# Patient Record
Sex: MALE | Race: WHITE | Employment: OTHER | URBAN - METROPOLITAN AREA
[De-identification: names, ages, dates, MRNs, and addresses within clinical notes are randomized per-mention and may not be internally consistent; named-entity substitution may affect disease eponyms.]

---

## 2017-01-06 RX ORDER — FLUOXETINE 10 MG/1
CAPSULE ORAL
Qty: 90 CAPSULE | Refills: 0 | Status: SHIPPED | OUTPATIENT
Start: 2017-01-06 | End: 2017-04-01 | Stop reason: SDUPTHER

## 2017-01-30 PROBLEM — Z95.810 S/P IMPLANTATION OF AUTOMATIC CARDIOVERTER/DEFIBRILLATOR (AICD): Status: ACTIVE | Noted: 2017-01-30

## 2017-03-06 ENCOUNTER — HOSPITAL ENCOUNTER (OUTPATIENT)
Dept: OTHER | Age: 82
Discharge: HOME | End: 2017-03-06
Payer: MEDICARE

## 2017-03-06 VITALS
SYSTOLIC BLOOD PRESSURE: 140 MMHG | HEART RATE: 65 BPM | RESPIRATION RATE: 20 BRPM | OXYGEN SATURATION: 99 % | DIASTOLIC BLOOD PRESSURE: 72 MMHG | WEIGHT: 184.6 LBS | BODY MASS INDEX: 25.04 KG/M2

## 2017-03-06 PROCEDURE — 99212 OFFICE O/P EST SF 10 MIN: CPT

## 2017-03-10 ENCOUNTER — HOSPITAL ENCOUNTER (OUTPATIENT)
Dept: CARDIAC REHAB | Age: 82
Setting detail: THERAPIES SERIES
Discharge: HOME | End: 2017-03-10
Payer: MEDICARE

## 2017-03-10 VITALS — HEIGHT: 72 IN | BODY MASS INDEX: 25 KG/M2 | WEIGHT: 184.6 LBS | HEART RATE: 63 BPM

## 2017-03-10 PROCEDURE — 93798 PHYS/QHP OP CAR RHAB W/ECG: CPT

## 2017-03-13 ENCOUNTER — HOSPITAL ENCOUNTER (OUTPATIENT)
Dept: CARDIAC REHAB | Age: 82
Setting detail: THERAPIES SERIES
Discharge: HOME | End: 2017-03-13
Payer: MEDICARE

## 2017-03-13 VITALS — BODY MASS INDEX: 24.87 KG/M2 | WEIGHT: 183.4 LBS

## 2017-03-13 PROCEDURE — 93798 PHYS/QHP OP CAR RHAB W/ECG: CPT

## 2017-03-13 RX ORDER — FLUDROCORTISONE ACETATE 0.1 MG/1
TABLET ORAL
Qty: 90 TABLET | Refills: 1 | Status: ON HOLD | OUTPATIENT
Start: 2017-03-13 | End: 2020-01-01

## 2017-03-17 ENCOUNTER — HOSPITAL ENCOUNTER (OUTPATIENT)
Dept: CARDIAC REHAB | Age: 82
Setting detail: THERAPIES SERIES
Discharge: HOME | End: 2017-03-17
Payer: MEDICARE

## 2017-03-17 VITALS — BODY MASS INDEX: 24.97 KG/M2 | WEIGHT: 184.1 LBS

## 2017-03-17 PROCEDURE — 93798 PHYS/QHP OP CAR RHAB W/ECG: CPT

## 2017-03-20 ENCOUNTER — HOSPITAL ENCOUNTER (OUTPATIENT)
Dept: CARDIAC REHAB | Age: 82
Setting detail: THERAPIES SERIES
Discharge: HOME | End: 2017-03-20
Payer: MEDICARE

## 2017-03-20 VITALS — WEIGHT: 185.1 LBS | BODY MASS INDEX: 25.1 KG/M2

## 2017-03-20 PROCEDURE — 93798 PHYS/QHP OP CAR RHAB W/ECG: CPT

## 2017-03-24 ENCOUNTER — HOSPITAL ENCOUNTER (OUTPATIENT)
Dept: CARDIAC REHAB | Age: 82
Setting detail: THERAPIES SERIES
Discharge: HOME | End: 2017-03-24
Payer: MEDICARE

## 2017-03-24 VITALS — BODY MASS INDEX: 25.28 KG/M2 | WEIGHT: 186.4 LBS

## 2017-03-24 PROCEDURE — 93798 PHYS/QHP OP CAR RHAB W/ECG: CPT

## 2017-03-27 ENCOUNTER — HOSPITAL ENCOUNTER (OUTPATIENT)
Dept: CARDIAC REHAB | Age: 82
Setting detail: THERAPIES SERIES
Discharge: HOME | End: 2017-03-27
Payer: MEDICARE

## 2017-03-27 VITALS — BODY MASS INDEX: 25.12 KG/M2 | WEIGHT: 185.2 LBS

## 2017-03-27 PROCEDURE — 93798 PHYS/QHP OP CAR RHAB W/ECG: CPT

## 2017-03-31 ENCOUNTER — HOSPITAL ENCOUNTER (OUTPATIENT)
Dept: CARDIAC REHAB | Age: 82
Setting detail: THERAPIES SERIES
Discharge: HOME | End: 2017-03-31
Payer: MEDICARE

## 2017-03-31 VITALS — WEIGHT: 185.4 LBS | BODY MASS INDEX: 25.14 KG/M2

## 2017-03-31 PROCEDURE — 93798 PHYS/QHP OP CAR RHAB W/ECG: CPT

## 2017-04-03 ENCOUNTER — HOSPITAL ENCOUNTER (OUTPATIENT)
Dept: CARDIAC REHAB | Age: 82
Setting detail: THERAPIES SERIES
Discharge: HOME | End: 2017-04-03
Payer: MEDICARE

## 2017-04-03 VITALS — WEIGHT: 184.6 LBS | BODY MASS INDEX: 25.04 KG/M2

## 2017-04-03 PROCEDURE — 93798 PHYS/QHP OP CAR RHAB W/ECG: CPT

## 2017-04-03 RX ORDER — FLUOXETINE 10 MG/1
CAPSULE ORAL
Qty: 90 CAPSULE | Refills: 0 | Status: SHIPPED | OUTPATIENT
Start: 2017-04-03 | End: 2017-06-20 | Stop reason: SDUPTHER

## 2017-04-07 ENCOUNTER — HOSPITAL ENCOUNTER (OUTPATIENT)
Dept: CARDIAC REHAB | Age: 82
Setting detail: THERAPIES SERIES
Discharge: HOME | End: 2017-04-07
Payer: MEDICARE

## 2017-04-07 VITALS — WEIGHT: 187.3 LBS | BODY MASS INDEX: 25.4 KG/M2

## 2017-04-07 PROCEDURE — 93798 PHYS/QHP OP CAR RHAB W/ECG: CPT

## 2017-04-10 ENCOUNTER — HOSPITAL ENCOUNTER (OUTPATIENT)
Dept: CARDIAC REHAB | Age: 82
Setting detail: THERAPIES SERIES
Discharge: HOME | End: 2017-04-10
Payer: MEDICARE

## 2017-04-10 VITALS — WEIGHT: 184.4 LBS | BODY MASS INDEX: 25.01 KG/M2

## 2017-04-10 PROCEDURE — 93798 PHYS/QHP OP CAR RHAB W/ECG: CPT

## 2017-04-17 ENCOUNTER — HOSPITAL ENCOUNTER (OUTPATIENT)
Dept: CARDIAC REHAB | Age: 82
Setting detail: THERAPIES SERIES
Discharge: HOME | End: 2017-04-17
Payer: MEDICARE

## 2017-04-17 VITALS — BODY MASS INDEX: 25.29 KG/M2 | WEIGHT: 186.5 LBS

## 2017-04-17 PROCEDURE — 93798 PHYS/QHP OP CAR RHAB W/ECG: CPT

## 2017-04-21 ENCOUNTER — HOSPITAL ENCOUNTER (OUTPATIENT)
Dept: CARDIAC REHAB | Age: 82
Setting detail: THERAPIES SERIES
Discharge: HOME | End: 2017-04-21
Payer: MEDICARE

## 2017-04-21 VITALS — WEIGHT: 185 LBS | BODY MASS INDEX: 25.09 KG/M2

## 2017-04-21 PROCEDURE — 93798 PHYS/QHP OP CAR RHAB W/ECG: CPT

## 2017-04-24 ENCOUNTER — HOSPITAL ENCOUNTER (OUTPATIENT)
Dept: CARDIAC REHAB | Age: 82
Setting detail: THERAPIES SERIES
Discharge: HOME | End: 2017-04-24
Payer: MEDICARE

## 2017-04-24 VITALS — BODY MASS INDEX: 24.63 KG/M2 | WEIGHT: 181.6 LBS

## 2017-04-24 PROCEDURE — 93798 PHYS/QHP OP CAR RHAB W/ECG: CPT

## 2017-04-26 ENCOUNTER — HOSPITAL ENCOUNTER (OUTPATIENT)
Dept: CARDIAC REHAB | Age: 82
Setting detail: THERAPIES SERIES
Discharge: HOME | End: 2017-04-26
Payer: MEDICARE

## 2017-04-26 VITALS — WEIGHT: 185.1 LBS | BODY MASS INDEX: 25.1 KG/M2

## 2017-04-26 PROCEDURE — 93798 PHYS/QHP OP CAR RHAB W/ECG: CPT

## 2017-04-28 ENCOUNTER — HOSPITAL ENCOUNTER (OUTPATIENT)
Dept: CARDIAC REHAB | Age: 82
Setting detail: THERAPIES SERIES
Discharge: HOME | End: 2017-04-28
Payer: MEDICARE

## 2017-04-28 VITALS — WEIGHT: 184.6 LBS | BODY MASS INDEX: 25.04 KG/M2

## 2017-04-28 PROCEDURE — 93798 PHYS/QHP OP CAR RHAB W/ECG: CPT

## 2017-05-01 ENCOUNTER — HOSPITAL ENCOUNTER (OUTPATIENT)
Dept: CARDIAC REHAB | Age: 82
Setting detail: THERAPIES SERIES
Discharge: HOME | End: 2017-05-01
Payer: MEDICARE

## 2017-05-01 ENCOUNTER — HOSPITAL ENCOUNTER (OUTPATIENT)
Age: 82
Discharge: HOME | End: 2017-05-01
Payer: MEDICARE

## 2017-05-01 VITALS — BODY MASS INDEX: 24.45 KG/M2 | WEIGHT: 180.3 LBS

## 2017-05-01 LAB
-: ABNORMAL
AMORPHOUS: ABNORMAL
BACTERIA: ABNORMAL
BILIRUBIN URINE: NEGATIVE
CASTS UA: ABNORMAL /LPF
COLOR: YELLOW
COMMENT UA: ABNORMAL
CRYSTALS, UA: ABNORMAL /HPF
EPITHELIAL CELLS UA: ABNORMAL /HPF
GLUCOSE URINE: NEGATIVE
KETONES, URINE: NEGATIVE
LEUKOCYTE ESTERASE, URINE: ABNORMAL
MUCUS: ABNORMAL
NITRITE, URINE: NEGATIVE
OTHER OBSERVATIONS UA: ABNORMAL
PH UA: 6 (ref 5–8)
PROTEIN UA: NEGATIVE
RBC UA: ABNORMAL /HPF
RENAL EPITHELIAL, UA: ABNORMAL /HPF
SPECIFIC GRAVITY UA: 1.02 (ref 1–1.03)
TRICHOMONAS: ABNORMAL
TURBIDITY: CLEAR
URINE HGB: NEGATIVE
UROBILINOGEN, URINE: NORMAL
WBC UA: ABNORMAL /HPF
YEAST: ABNORMAL

## 2017-05-01 PROCEDURE — 93798 PHYS/QHP OP CAR RHAB W/ECG: CPT

## 2017-05-01 PROCEDURE — 81001 URINALYSIS AUTO W/SCOPE: CPT

## 2017-05-01 PROCEDURE — 87086 URINE CULTURE/COLONY COUNT: CPT

## 2017-05-02 LAB
CULTURE: NO GROWTH
CULTURE: NORMAL
Lab: NORMAL
SPECIMEN DESCRIPTION: NORMAL
SPECIMEN DESCRIPTION: NORMAL
STATUS: NORMAL

## 2017-05-03 ENCOUNTER — HOSPITAL ENCOUNTER (OUTPATIENT)
Dept: CARDIAC REHAB | Age: 82
Setting detail: THERAPIES SERIES
Discharge: HOME | End: 2017-05-03
Payer: MEDICARE

## 2017-05-03 VITALS — WEIGHT: 182.2 LBS | BODY MASS INDEX: 24.71 KG/M2

## 2017-05-03 PROCEDURE — 93798 PHYS/QHP OP CAR RHAB W/ECG: CPT

## 2017-05-05 ENCOUNTER — HOSPITAL ENCOUNTER (OUTPATIENT)
Dept: CARDIAC REHAB | Age: 82
Setting detail: THERAPIES SERIES
Discharge: HOME | End: 2017-05-05
Payer: MEDICARE

## 2017-05-05 VITALS — WEIGHT: 182.9 LBS | BODY MASS INDEX: 24.81 KG/M2

## 2017-05-05 PROCEDURE — 93798 PHYS/QHP OP CAR RHAB W/ECG: CPT

## 2017-05-08 ENCOUNTER — HOSPITAL ENCOUNTER (OUTPATIENT)
Dept: CARDIAC REHAB | Age: 82
Setting detail: THERAPIES SERIES
Discharge: HOME | End: 2017-05-08
Payer: MEDICARE

## 2017-05-08 VITALS — BODY MASS INDEX: 24.68 KG/M2 | WEIGHT: 182 LBS

## 2017-05-08 PROCEDURE — 93798 PHYS/QHP OP CAR RHAB W/ECG: CPT

## 2017-05-10 ENCOUNTER — HOSPITAL ENCOUNTER (OUTPATIENT)
Dept: CARDIAC REHAB | Age: 82
Setting detail: THERAPIES SERIES
Discharge: HOME | End: 2017-05-10
Payer: MEDICARE

## 2017-05-10 VITALS — BODY MASS INDEX: 24.92 KG/M2 | WEIGHT: 184 LBS | HEIGHT: 72 IN

## 2017-05-10 PROCEDURE — 93798 PHYS/QHP OP CAR RHAB W/ECG: CPT

## 2017-05-12 ENCOUNTER — HOSPITAL ENCOUNTER (OUTPATIENT)
Dept: CARDIAC REHAB | Age: 82
Setting detail: THERAPIES SERIES
Discharge: HOME | End: 2017-05-12
Payer: MEDICARE

## 2017-05-12 VITALS — BODY MASS INDEX: 24.97 KG/M2 | WEIGHT: 184.1 LBS

## 2017-05-12 PROCEDURE — 93798 PHYS/QHP OP CAR RHAB W/ECG: CPT

## 2017-05-15 ENCOUNTER — HOSPITAL ENCOUNTER (OUTPATIENT)
Dept: CARDIAC REHAB | Age: 82
Setting detail: THERAPIES SERIES
Discharge: HOME | End: 2017-05-15
Payer: MEDICARE

## 2017-05-15 VITALS — WEIGHT: 182.3 LBS | BODY MASS INDEX: 24.72 KG/M2

## 2017-05-15 PROCEDURE — 93798 PHYS/QHP OP CAR RHAB W/ECG: CPT

## 2017-05-17 ENCOUNTER — HOSPITAL ENCOUNTER (OUTPATIENT)
Dept: CARDIAC REHAB | Age: 82
Setting detail: THERAPIES SERIES
Discharge: HOME | End: 2017-05-17
Payer: MEDICARE

## 2017-05-17 VITALS — BODY MASS INDEX: 24.81 KG/M2 | WEIGHT: 182.9 LBS

## 2017-05-17 PROCEDURE — 93798 PHYS/QHP OP CAR RHAB W/ECG: CPT

## 2017-05-19 ENCOUNTER — HOSPITAL ENCOUNTER (OUTPATIENT)
Dept: CARDIAC REHAB | Age: 82
Setting detail: THERAPIES SERIES
Discharge: HOME | End: 2017-05-19
Payer: MEDICARE

## 2017-05-19 VITALS — BODY MASS INDEX: 24.98 KG/M2 | WEIGHT: 184.2 LBS

## 2017-05-19 PROCEDURE — 93798 PHYS/QHP OP CAR RHAB W/ECG: CPT

## 2017-05-22 ENCOUNTER — HOSPITAL ENCOUNTER (OUTPATIENT)
Dept: CARDIAC REHAB | Age: 82
Setting detail: THERAPIES SERIES
Discharge: HOME | End: 2017-05-22
Payer: MEDICARE

## 2017-05-22 VITALS — WEIGHT: 182.4 LBS | BODY MASS INDEX: 24.74 KG/M2

## 2017-05-22 PROCEDURE — 93798 PHYS/QHP OP CAR RHAB W/ECG: CPT

## 2017-05-24 ENCOUNTER — HOSPITAL ENCOUNTER (OUTPATIENT)
Dept: CARDIAC REHAB | Age: 82
Setting detail: THERAPIES SERIES
Discharge: HOME | End: 2017-05-24
Payer: MEDICARE

## 2017-05-24 VITALS — WEIGHT: 183.5 LBS | BODY MASS INDEX: 24.89 KG/M2

## 2017-05-24 PROCEDURE — 93798 PHYS/QHP OP CAR RHAB W/ECG: CPT

## 2017-05-26 ENCOUNTER — HOSPITAL ENCOUNTER (OUTPATIENT)
Dept: CARDIAC REHAB | Age: 82
Setting detail: THERAPIES SERIES
Discharge: HOME | End: 2017-05-26
Payer: MEDICARE

## 2017-05-26 VITALS — WEIGHT: 183.6 LBS | BODY MASS INDEX: 24.9 KG/M2

## 2017-05-26 PROCEDURE — 93798 PHYS/QHP OP CAR RHAB W/ECG: CPT

## 2017-05-31 ENCOUNTER — HOSPITAL ENCOUNTER (OUTPATIENT)
Dept: CARDIAC REHAB | Age: 82
Setting detail: THERAPIES SERIES
Discharge: HOME | End: 2017-05-31
Payer: MEDICARE

## 2017-05-31 VITALS — BODY MASS INDEX: 24.98 KG/M2 | WEIGHT: 184.2 LBS

## 2017-05-31 PROCEDURE — 93798 PHYS/QHP OP CAR RHAB W/ECG: CPT

## 2017-06-02 ENCOUNTER — HOSPITAL ENCOUNTER (OUTPATIENT)
Dept: CARDIAC REHAB | Age: 82
Setting detail: THERAPIES SERIES
Discharge: HOME | End: 2017-06-02
Payer: MEDICARE

## 2017-06-02 VITALS — WEIGHT: 185.4 LBS | BODY MASS INDEX: 25.14 KG/M2

## 2017-06-02 PROCEDURE — 93798 PHYS/QHP OP CAR RHAB W/ECG: CPT

## 2017-06-07 ENCOUNTER — HOSPITAL ENCOUNTER (OUTPATIENT)
Dept: CARDIAC REHAB | Age: 82
Setting detail: THERAPIES SERIES
Discharge: HOME | End: 2017-06-07
Payer: MEDICARE

## 2017-06-07 VITALS — BODY MASS INDEX: 25.06 KG/M2 | WEIGHT: 184.8 LBS

## 2017-06-07 PROCEDURE — 93798 PHYS/QHP OP CAR RHAB W/ECG: CPT

## 2017-06-09 ENCOUNTER — HOSPITAL ENCOUNTER (OUTPATIENT)
Dept: CARDIAC REHAB | Age: 82
Setting detail: THERAPIES SERIES
Discharge: HOME | End: 2017-06-09
Payer: MEDICARE

## 2017-06-09 VITALS — WEIGHT: 185.3 LBS | BODY MASS INDEX: 25.13 KG/M2

## 2017-06-09 PROCEDURE — 93798 PHYS/QHP OP CAR RHAB W/ECG: CPT

## 2017-06-12 ENCOUNTER — HOSPITAL ENCOUNTER (OUTPATIENT)
Dept: CARDIAC REHAB | Age: 82
Setting detail: THERAPIES SERIES
Discharge: HOME | End: 2017-06-12
Payer: MEDICARE

## 2017-06-12 VITALS — BODY MASS INDEX: 24.94 KG/M2 | WEIGHT: 183.9 LBS

## 2017-06-12 PROCEDURE — 93798 PHYS/QHP OP CAR RHAB W/ECG: CPT

## 2017-06-14 ENCOUNTER — HOSPITAL ENCOUNTER (OUTPATIENT)
Dept: CARDIAC REHAB | Age: 82
Setting detail: THERAPIES SERIES
Discharge: HOME | End: 2017-06-14
Payer: MEDICARE

## 2017-06-14 VITALS — WEIGHT: 183.9 LBS | BODY MASS INDEX: 24.94 KG/M2

## 2017-06-14 PROCEDURE — 93798 PHYS/QHP OP CAR RHAB W/ECG: CPT

## 2017-06-16 ENCOUNTER — HOSPITAL ENCOUNTER (OUTPATIENT)
Dept: CARDIAC REHAB | Age: 82
Setting detail: THERAPIES SERIES
Discharge: HOME | End: 2017-06-16
Payer: MEDICARE

## 2017-06-16 VITALS — BODY MASS INDEX: 24.85 KG/M2 | WEIGHT: 183.2 LBS

## 2017-06-16 PROCEDURE — 93798 PHYS/QHP OP CAR RHAB W/ECG: CPT

## 2017-06-19 ENCOUNTER — HOSPITAL ENCOUNTER (OUTPATIENT)
Dept: CARDIAC REHAB | Age: 82
Setting detail: THERAPIES SERIES
Discharge: HOME | End: 2017-06-19
Payer: MEDICARE

## 2017-06-19 VITALS — WEIGHT: 184.4 LBS | BODY MASS INDEX: 25.01 KG/M2

## 2017-06-19 PROCEDURE — 93798 PHYS/QHP OP CAR RHAB W/ECG: CPT

## 2017-06-20 ENCOUNTER — HOSPITAL ENCOUNTER (OUTPATIENT)
Age: 82
Setting detail: SPECIMEN
Discharge: HOME | End: 2017-06-20
Payer: MEDICARE

## 2017-06-20 ENCOUNTER — OFFICE VISIT (OUTPATIENT)
Dept: INTERNAL MEDICINE CLINIC | Age: 82
End: 2017-06-20
Payer: MEDICARE

## 2017-06-20 VITALS
RESPIRATION RATE: 20 BRPM | TEMPERATURE: 98 F | OXYGEN SATURATION: 96 % | WEIGHT: 185.2 LBS | HEIGHT: 72 IN | BODY MASS INDEX: 25.09 KG/M2 | HEART RATE: 81 BPM | SYSTOLIC BLOOD PRESSURE: 138 MMHG | DIASTOLIC BLOOD PRESSURE: 80 MMHG

## 2017-06-20 DIAGNOSIS — R53.83 FATIGUE, UNSPECIFIED TYPE: ICD-10-CM

## 2017-06-20 DIAGNOSIS — E78.49 OTHER HYPERLIPIDEMIA: ICD-10-CM

## 2017-06-20 DIAGNOSIS — N28.9 RENAL INSUFFICIENCY: ICD-10-CM

## 2017-06-20 DIAGNOSIS — F41.9 ANXIETY AND DEPRESSION: ICD-10-CM

## 2017-06-20 DIAGNOSIS — R73.01 IFG (IMPAIRED FASTING GLUCOSE): ICD-10-CM

## 2017-06-20 DIAGNOSIS — R53.83 FATIGUE, UNSPECIFIED TYPE: Primary | ICD-10-CM

## 2017-06-20 DIAGNOSIS — M35.3 POLYMYALGIA RHEUMATICA (HCC): ICD-10-CM

## 2017-06-20 DIAGNOSIS — F32.A ANXIETY AND DEPRESSION: ICD-10-CM

## 2017-06-20 DIAGNOSIS — I25.10 CORONARY ARTERY DISEASE INVOLVING NATIVE CORONARY ARTERY OF NATIVE HEART WITHOUT ANGINA PECTORIS: ICD-10-CM

## 2017-06-20 DIAGNOSIS — I10 ESSENTIAL HYPERTENSION: ICD-10-CM

## 2017-06-20 DIAGNOSIS — Z95.1 S/P CABG X 3: ICD-10-CM

## 2017-06-20 DIAGNOSIS — I50.20 CHF (CONGESTIVE HEART FAILURE), NYHA CLASS II, UNSPECIFIED FAILURE CHRONICITY, SYSTOLIC (HCC): ICD-10-CM

## 2017-06-20 DIAGNOSIS — J45.20 MILD INTERMITTENT ASTHMA WITHOUT COMPLICATION: ICD-10-CM

## 2017-06-20 LAB
ALBUMIN SERPL-MCNC: 4.1 G/DL (ref 3.5–5.2)
ALBUMIN/GLOBULIN RATIO: 1.3 (ref 1–2.5)
ALP BLD-CCNC: 74 U/L (ref 40–129)
ALT SERPL-CCNC: 18 U/L (ref 5–41)
ANION GAP SERPL CALCULATED.3IONS-SCNC: 13 MMOL/L (ref 9–17)
AST SERPL-CCNC: 22 U/L
BILIRUB SERPL-MCNC: 0.6 MG/DL (ref 0.3–1.2)
BILIRUBIN URINE: NEGATIVE
BUN BLDV-MCNC: 11 MG/DL (ref 8–23)
BUN/CREAT BLD: ABNORMAL (ref 9–20)
CALCIUM SERPL-MCNC: 9.6 MG/DL (ref 8.6–10.4)
CHLORIDE BLD-SCNC: 101 MMOL/L (ref 98–107)
CO2: 27 MMOL/L (ref 20–31)
COLOR: YELLOW
COMMENT UA: NORMAL
CREAT SERPL-MCNC: 1.23 MG/DL (ref 0.7–1.2)
ESTIMATED AVERAGE GLUCOSE: 103 MG/DL
GFR AFRICAN AMERICAN: >60 ML/MIN
GFR NON-AFRICAN AMERICAN: 57 ML/MIN
GFR SERPL CREATININE-BSD FRML MDRD: ABNORMAL ML/MIN/{1.73_M2}
GFR SERPL CREATININE-BSD FRML MDRD: ABNORMAL ML/MIN/{1.73_M2}
GLUCOSE BLD-MCNC: 108 MG/DL (ref 70–99)
GLUCOSE URINE: NEGATIVE
HBA1C MFR BLD: 5.2 % (ref 4–6)
HCT VFR BLD CALC: 47 % (ref 41–53)
HEMOGLOBIN: 15.7 G/DL (ref 13.5–17.5)
KETONES, URINE: NEGATIVE
LEUKOCYTE ESTERASE, URINE: NEGATIVE
MCH RBC QN AUTO: 30.7 PG (ref 26–34)
MCHC RBC AUTO-ENTMCNC: 33.3 G/DL (ref 31–37)
MCV RBC AUTO: 92.1 FL (ref 80–100)
NITRITE, URINE: NEGATIVE
PDW BLD-RTO: 16.6 % (ref 12.5–15.4)
PH UA: 7 (ref 5–8)
PLATELET # BLD: 166 K/UL (ref 140–450)
PMV BLD AUTO: 9 FL (ref 6–12)
POTASSIUM SERPL-SCNC: 4.9 MMOL/L (ref 3.7–5.3)
PROTEIN UA: NEGATIVE
RBC # BLD: 5.1 M/UL (ref 4.5–5.9)
SODIUM BLD-SCNC: 141 MMOL/L (ref 135–144)
SPECIFIC GRAVITY UA: 1.01 (ref 1–1.03)
TOTAL PROTEIN: 7.3 G/DL (ref 6.4–8.3)
TSH SERPL DL<=0.05 MIU/L-ACNC: 3.25 MIU/L (ref 0.3–5)
TURBIDITY: CLEAR
URINE HGB: NEGATIVE
UROBILINOGEN, URINE: NORMAL
WBC # BLD: 7.6 K/UL (ref 3.5–11)

## 2017-06-20 PROCEDURE — G8598 ASA/ANTIPLAT THER USED: HCPCS | Performed by: FAMILY MEDICINE

## 2017-06-20 PROCEDURE — 1123F ACP DISCUSS/DSCN MKR DOCD: CPT | Performed by: FAMILY MEDICINE

## 2017-06-20 PROCEDURE — 99214 OFFICE O/P EST MOD 30 MIN: CPT | Performed by: FAMILY MEDICINE

## 2017-06-20 PROCEDURE — 4004F PT TOBACCO SCREEN RCVD TLK: CPT | Performed by: FAMILY MEDICINE

## 2017-06-20 PROCEDURE — G8419 CALC BMI OUT NRM PARAM NOF/U: HCPCS | Performed by: FAMILY MEDICINE

## 2017-06-20 PROCEDURE — 4040F PNEUMOC VAC/ADMIN/RCVD: CPT | Performed by: FAMILY MEDICINE

## 2017-06-20 PROCEDURE — G8427 DOCREV CUR MEDS BY ELIG CLIN: HCPCS | Performed by: FAMILY MEDICINE

## 2017-06-20 RX ORDER — FLUOXETINE 10 MG/1
10 CAPSULE ORAL 2 TIMES DAILY
Qty: 180 CAPSULE | Refills: 3 | Status: SHIPPED | OUTPATIENT
Start: 2017-06-20 | End: 2017-06-21 | Stop reason: DRUGHIGH

## 2017-06-21 ENCOUNTER — HOSPITAL ENCOUNTER (OUTPATIENT)
Dept: CARDIAC REHAB | Age: 82
Setting detail: THERAPIES SERIES
Discharge: HOME | End: 2017-06-21
Payer: MEDICARE

## 2017-06-21 VITALS — WEIGHT: 184.8 LBS | BODY MASS INDEX: 25.03 KG/M2 | HEIGHT: 72 IN

## 2017-06-21 PROCEDURE — 93798 PHYS/QHP OP CAR RHAB W/ECG: CPT

## 2017-06-21 RX ORDER — FLUOXETINE 10 MG/1
10 CAPSULE ORAL 2 TIMES DAILY
Qty: 180 CAPSULE | Refills: 0 | Status: SHIPPED | OUTPATIENT
Start: 2017-06-21 | End: 2018-05-01 | Stop reason: ALTCHOICE

## 2017-06-21 ASSESSMENT — ENCOUNTER SYMPTOMS
RESPIRATORY NEGATIVE: 1
EYES NEGATIVE: 1
ALLERGIC/IMMUNOLOGIC NEGATIVE: 1

## 2017-06-23 ENCOUNTER — APPOINTMENT (OUTPATIENT)
Dept: CARDIAC REHAB | Age: 82
End: 2017-06-23
Payer: MEDICARE

## 2017-06-26 ENCOUNTER — APPOINTMENT (OUTPATIENT)
Dept: CARDIAC REHAB | Age: 82
End: 2017-06-26
Payer: MEDICARE

## 2017-06-30 ENCOUNTER — APPOINTMENT (OUTPATIENT)
Dept: CARDIAC REHAB | Age: 82
End: 2017-06-30
Payer: MEDICARE

## 2017-07-11 ENCOUNTER — TELEPHONE (OUTPATIENT)
Dept: INTERNAL MEDICINE CLINIC | Age: 82
End: 2017-07-11

## 2017-08-22 ENCOUNTER — OFFICE VISIT (OUTPATIENT)
Dept: INTERNAL MEDICINE CLINIC | Age: 82
End: 2017-08-22
Payer: MEDICARE

## 2017-08-22 VITALS
OXYGEN SATURATION: 98 % | HEART RATE: 66 BPM | RESPIRATION RATE: 16 BRPM | DIASTOLIC BLOOD PRESSURE: 80 MMHG | BODY MASS INDEX: 25.17 KG/M2 | HEIGHT: 72 IN | SYSTOLIC BLOOD PRESSURE: 138 MMHG | WEIGHT: 185.8 LBS

## 2017-08-22 DIAGNOSIS — Z95.810 S/P IMPLANTATION OF AUTOMATIC CARDIOVERTER/DEFIBRILLATOR (AICD): ICD-10-CM

## 2017-08-22 DIAGNOSIS — Z98.890 S/P CARDIAC CATH: ICD-10-CM

## 2017-08-22 DIAGNOSIS — I10 ESSENTIAL HYPERTENSION: Primary | ICD-10-CM

## 2017-08-22 DIAGNOSIS — Z95.1 S/P CABG X 3: ICD-10-CM

## 2017-08-22 DIAGNOSIS — I50.23 CHF (CONGESTIVE HEART FAILURE), NYHA CLASS II, ACUTE ON CHRONIC, SYSTOLIC (HCC): ICD-10-CM

## 2017-08-22 DIAGNOSIS — J45.20 MILD INTERMITTENT ASTHMA WITHOUT COMPLICATION: ICD-10-CM

## 2017-08-22 DIAGNOSIS — E78.2 MIXED HYPERLIPIDEMIA: ICD-10-CM

## 2017-08-22 DIAGNOSIS — R73.01 IFG (IMPAIRED FASTING GLUCOSE): ICD-10-CM

## 2017-08-22 DIAGNOSIS — K21.9 GASTROESOPHAGEAL REFLUX DISEASE WITHOUT ESOPHAGITIS: ICD-10-CM

## 2017-08-22 DIAGNOSIS — M35.3 POLYMYALGIA RHEUMATICA SYNDROME (HCC): ICD-10-CM

## 2017-08-22 DIAGNOSIS — I25.10 CORONARY ARTERY DISEASE INVOLVING NATIVE CORONARY ARTERY OF NATIVE HEART WITHOUT ANGINA PECTORIS: ICD-10-CM

## 2017-08-22 PROCEDURE — 4004F PT TOBACCO SCREEN RCVD TLK: CPT | Performed by: FAMILY MEDICINE

## 2017-08-22 PROCEDURE — 99214 OFFICE O/P EST MOD 30 MIN: CPT | Performed by: FAMILY MEDICINE

## 2017-08-22 PROCEDURE — 4040F PNEUMOC VAC/ADMIN/RCVD: CPT | Performed by: FAMILY MEDICINE

## 2017-08-22 PROCEDURE — G8427 DOCREV CUR MEDS BY ELIG CLIN: HCPCS | Performed by: FAMILY MEDICINE

## 2017-08-22 PROCEDURE — 1123F ACP DISCUSS/DSCN MKR DOCD: CPT | Performed by: FAMILY MEDICINE

## 2017-08-22 PROCEDURE — G8598 ASA/ANTIPLAT THER USED: HCPCS | Performed by: FAMILY MEDICINE

## 2017-08-22 PROCEDURE — G8419 CALC BMI OUT NRM PARAM NOF/U: HCPCS | Performed by: FAMILY MEDICINE

## 2017-08-22 ASSESSMENT — PATIENT HEALTH QUESTIONNAIRE - PHQ9
SUM OF ALL RESPONSES TO PHQ QUESTIONS 1-9: 0
2. FEELING DOWN, DEPRESSED OR HOPELESS: 0
SUM OF ALL RESPONSES TO PHQ9 QUESTIONS 1 & 2: 0
1. LITTLE INTEREST OR PLEASURE IN DOING THINGS: 0

## 2017-08-22 ASSESSMENT — ENCOUNTER SYMPTOMS
ALLERGIC/IMMUNOLOGIC NEGATIVE: 1
EYES NEGATIVE: 1
RESPIRATORY NEGATIVE: 1

## 2018-01-30 ENCOUNTER — HOSPITAL ENCOUNTER (OUTPATIENT)
Age: 83
Discharge: HOME | End: 2018-01-30
Payer: MEDICARE

## 2018-01-30 LAB
-: NORMAL
AMORPHOUS: NORMAL
BACTERIA: NORMAL
BILIRUBIN URINE: NEGATIVE
CASTS UA: NORMAL /LPF
COLOR: YELLOW
COMMENT UA: ABNORMAL
CRYSTALS, UA: NORMAL /HPF
EPITHELIAL CELLS UA: NORMAL /HPF
GLUCOSE URINE: NEGATIVE
KETONES, URINE: NEGATIVE
LEUKOCYTE ESTERASE, URINE: NEGATIVE
MUCUS: NORMAL
NITRITE, URINE: NEGATIVE
OTHER OBSERVATIONS UA: NORMAL
PH UA: 6 (ref 5–8)
PROTEIN UA: NEGATIVE
RBC UA: NORMAL /HPF
RENAL EPITHELIAL, UA: NORMAL /HPF
SPECIFIC GRAVITY UA: 1.02 (ref 1–1.03)
TRICHOMONAS: NORMAL
TURBIDITY: CLEAR
URINE HGB: ABNORMAL
UROBILINOGEN, URINE: NORMAL
WBC UA: NORMAL /HPF
YEAST: NORMAL

## 2018-01-30 PROCEDURE — 81001 URINALYSIS AUTO W/SCOPE: CPT

## 2018-01-30 PROCEDURE — 87086 URINE CULTURE/COLONY COUNT: CPT

## 2018-05-01 ENCOUNTER — OFFICE VISIT (OUTPATIENT)
Dept: INTERNAL MEDICINE CLINIC | Age: 83
End: 2018-05-01
Payer: MEDICARE

## 2018-05-01 VITALS
RESPIRATION RATE: 16 BRPM | HEIGHT: 72 IN | BODY MASS INDEX: 25.35 KG/M2 | SYSTOLIC BLOOD PRESSURE: 132 MMHG | HEART RATE: 101 BPM | WEIGHT: 187.2 LBS | DIASTOLIC BLOOD PRESSURE: 76 MMHG | TEMPERATURE: 98.5 F

## 2018-05-01 DIAGNOSIS — F41.9 ANXIETY AND DEPRESSION: ICD-10-CM

## 2018-05-01 DIAGNOSIS — R73.01 IFG (IMPAIRED FASTING GLUCOSE): ICD-10-CM

## 2018-05-01 DIAGNOSIS — M35.3 POLYMYALGIA RHEUMATICA SYNDROME (HCC): ICD-10-CM

## 2018-05-01 DIAGNOSIS — I10 ESSENTIAL HYPERTENSION: Primary | ICD-10-CM

## 2018-05-01 DIAGNOSIS — F32.A ANXIETY AND DEPRESSION: ICD-10-CM

## 2018-05-01 DIAGNOSIS — J45.20 MILD INTERMITTENT ASTHMA WITHOUT COMPLICATION: ICD-10-CM

## 2018-05-01 DIAGNOSIS — Z95.1 S/P CABG X 3: ICD-10-CM

## 2018-05-01 DIAGNOSIS — I50.9 CONGESTIVE HEART FAILURE, NYHA CLASS 2, UNSPECIFIED CONGESTIVE HEART FAILURE TYPE (HCC): ICD-10-CM

## 2018-05-01 DIAGNOSIS — N28.9 RENAL INSUFFICIENCY: ICD-10-CM

## 2018-05-01 PROCEDURE — 99214 OFFICE O/P EST MOD 30 MIN: CPT | Performed by: FAMILY MEDICINE

## 2018-05-01 PROCEDURE — G8417 CALC BMI ABV UP PARAM F/U: HCPCS | Performed by: FAMILY MEDICINE

## 2018-05-01 PROCEDURE — 1123F ACP DISCUSS/DSCN MKR DOCD: CPT | Performed by: FAMILY MEDICINE

## 2018-05-01 PROCEDURE — G8598 ASA/ANTIPLAT THER USED: HCPCS | Performed by: FAMILY MEDICINE

## 2018-05-01 PROCEDURE — 4004F PT TOBACCO SCREEN RCVD TLK: CPT | Performed by: FAMILY MEDICINE

## 2018-05-01 PROCEDURE — 4040F PNEUMOC VAC/ADMIN/RCVD: CPT | Performed by: FAMILY MEDICINE

## 2018-05-01 PROCEDURE — G8427 DOCREV CUR MEDS BY ELIG CLIN: HCPCS | Performed by: FAMILY MEDICINE

## 2018-05-01 RX ORDER — TROSPIUM CHLORIDE 20 MG/1
20 TABLET, FILM COATED ORAL 2 TIMES DAILY
Status: ON HOLD | COMMUNITY
End: 2020-01-01

## 2018-05-01 RX ORDER — FLUOXETINE 10 MG/1
10 CAPSULE ORAL 2 TIMES DAILY
Qty: 180 CAPSULE | Refills: 0 | Status: CANCELLED | OUTPATIENT
Start: 2018-05-01

## 2018-05-01 RX ORDER — FLUOXETINE HYDROCHLORIDE 20 MG/1
20 CAPSULE ORAL DAILY
Qty: 90 CAPSULE | Refills: 3 | Status: SHIPPED | OUTPATIENT
Start: 2018-05-01 | End: 2018-05-10 | Stop reason: DRUGHIGH

## 2018-05-01 ASSESSMENT — ENCOUNTER SYMPTOMS
ALLERGIC/IMMUNOLOGIC NEGATIVE: 1
EYES NEGATIVE: 1
GASTROINTESTINAL NEGATIVE: 1
RESPIRATORY NEGATIVE: 1
BACK PAIN: 1

## 2018-05-07 ENCOUNTER — TELEPHONE (OUTPATIENT)
Dept: INTERNAL MEDICINE CLINIC | Age: 83
End: 2018-05-07

## 2018-05-07 DIAGNOSIS — M35.3 POLYMYALGIA RHEUMATICA SYNDROME (HCC): Primary | ICD-10-CM

## 2018-05-10 RX ORDER — FLUOXETINE 10 MG/1
10 CAPSULE ORAL DAILY
Qty: 30 CAPSULE | Refills: 3 | Status: SHIPPED | OUTPATIENT
Start: 2018-05-10 | End: 2018-09-11 | Stop reason: SDUPTHER

## 2018-09-12 RX ORDER — FLUOXETINE 10 MG/1
CAPSULE ORAL
Qty: 30 CAPSULE | Refills: 0 | Status: SHIPPED | OUTPATIENT
Start: 2018-09-12 | End: 2018-10-13 | Stop reason: SDUPTHER

## 2018-09-12 NOTE — TELEPHONE ENCOUNTER
Last visit:5/1/18  Last Med refill:5/10/18    Next Visit Date:  No future appointments.     Health Maintenance   Topic Date Due    Shingles Vaccine (1 of 2 - 2 Dose Series) 10/10/1987    Pneumococcal low/med risk (1 of 2 - PCV13) 10/10/2002    Potassium monitoring  06/20/2018    Creatinine monitoring  06/20/2018    DTaP/Tdap/Td vaccine (1 - Tdap) 09/19/2018 (Originally [birth year]10/1954)    Flu vaccine (1) 09/27/2018 (Originally 9/1/2018)       Hemoglobin A1C (%)   Date Value   06/20/2017 5.2   04/04/2016 5.7   09/08/2015 5.5             ( goal A1C is < 7)   No results found for: LABMICR  LDL Cholesterol (mg/dL)   Date Value   10/25/2016 62   07/28/2016 40       (goal LDL is <100)   AST (U/L)   Date Value   06/20/2017 22     ALT (U/L)   Date Value   06/20/2017 18     BUN (mg/dL)   Date Value   06/20/2017 11     BP Readings from Last 3 Encounters:   05/01/18 132/76   08/22/17 138/80   06/20/17 138/80          (goal 120/80)    All Future Testing planned in [facility]  Lab Frequency Next Occurrence               Patient Active Problem List:     S/P CABG x 3 (2009)     CHF (congestive heart failure), NYHA class II (HCC)     S/P cardiac cath (4/24/14-Dr. 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 Flavia Shah

## 2018-10-09 ENCOUNTER — HOSPITAL ENCOUNTER (OUTPATIENT)
Dept: PHYSICAL THERAPY | Age: 83
Setting detail: THERAPIES SERIES
Discharge: HOME | End: 2018-10-09
Payer: MEDICARE

## 2018-10-09 PROCEDURE — 97016 VASOPNEUMATIC DEVICE THERAPY: CPT

## 2018-10-09 PROCEDURE — G8984 CARRY CURRENT STATUS: HCPCS

## 2018-10-09 PROCEDURE — 97110 THERAPEUTIC EXERCISES: CPT

## 2018-10-09 PROCEDURE — 97162 PT EVAL MOD COMPLEX 30 MIN: CPT

## 2018-10-09 PROCEDURE — G8985 CARRY GOAL STATUS: HCPCS

## 2018-10-11 ENCOUNTER — HOSPITAL ENCOUNTER (OUTPATIENT)
Dept: PHYSICAL THERAPY | Age: 83
Setting detail: THERAPIES SERIES
Discharge: HOME | End: 2018-10-11
Payer: MEDICARE

## 2018-10-11 PROCEDURE — 97110 THERAPEUTIC EXERCISES: CPT

## 2018-10-11 PROCEDURE — 97016 VASOPNEUMATIC DEVICE THERAPY: CPT

## 2018-10-11 PROCEDURE — 97140 MANUAL THERAPY 1/> REGIONS: CPT

## 2018-10-15 RX ORDER — FLUOXETINE 10 MG/1
CAPSULE ORAL
Qty: 30 CAPSULE | Refills: 0 | Status: SHIPPED | OUTPATIENT
Start: 2018-10-15 | End: 2018-11-13 | Stop reason: SDUPTHER

## 2018-10-16 ENCOUNTER — HOSPITAL ENCOUNTER (OUTPATIENT)
Dept: PHYSICAL THERAPY | Age: 83
Setting detail: THERAPIES SERIES
Discharge: HOME | End: 2018-10-16
Payer: MEDICARE

## 2018-10-16 PROCEDURE — 97016 VASOPNEUMATIC DEVICE THERAPY: CPT

## 2018-10-16 PROCEDURE — 97140 MANUAL THERAPY 1/> REGIONS: CPT

## 2018-10-16 PROCEDURE — 97110 THERAPEUTIC EXERCISES: CPT

## 2018-10-18 ENCOUNTER — HOSPITAL ENCOUNTER (OUTPATIENT)
Dept: PHYSICAL THERAPY | Age: 83
Setting detail: THERAPIES SERIES
Discharge: HOME | End: 2018-10-18
Payer: MEDICARE

## 2018-10-18 PROCEDURE — 97140 MANUAL THERAPY 1/> REGIONS: CPT

## 2018-10-18 PROCEDURE — 97016 VASOPNEUMATIC DEVICE THERAPY: CPT

## 2018-10-18 PROCEDURE — 97110 THERAPEUTIC EXERCISES: CPT

## 2018-10-23 ENCOUNTER — HOSPITAL ENCOUNTER (OUTPATIENT)
Dept: PHYSICAL THERAPY | Age: 83
Setting detail: THERAPIES SERIES
Discharge: HOME | End: 2018-10-23
Payer: MEDICARE

## 2018-10-23 PROCEDURE — 97110 THERAPEUTIC EXERCISES: CPT

## 2018-10-23 PROCEDURE — 97140 MANUAL THERAPY 1/> REGIONS: CPT

## 2018-10-23 PROCEDURE — 97016 VASOPNEUMATIC DEVICE THERAPY: CPT

## 2018-10-23 NOTE — FLOWSHEET NOTE
[address] [facility]   [address]   Phone: [phone]   Fax: [phone]    Physical Therapy Daily Treatment Note      Date:  10/23/2018  Patient Name:  Shiva Adame    :  1935  MRN: [id number]   Physician: Cristian Edmondson MD      Insurance: Medicare [employer]              Eligibility Status:  Eligible        Secondary Insurance (if applicable) [employer]              Eligibility Status: Eligible        DOS: 10/5/18  # of visits allowed/remaining: follow medicare guidelines  Source: Website  Spoke To:  RTE  Reference: Na  Auth: NPRe     Electronically signed by Luis Pettit on 10/2/18 at 11:37 AM     Medical Diagnosis:  Other shoulder lesions, right shoulder                           Rehab Codes: M25.411, M25.511, M25.611, M54.2, R29.3,   Onset Date: 18                                  Next 's appt: tbd  Visit# / total visits: 3/10-  Cancels/No Shows: 0/0         MEDICARE CAP VISIT MAINTAINED IN NAVIGATOR      Subjective: Right shoulder doing much better. Able to move with little to no pain  Pain:  [x] Yes  [] No Location: Right shoulder pain  N/A Pain Rating: (0-10 scale) 1/10  Pain altered Tx:  [] No  [] Yes  Action:  Comments:    Objective:   Todays Treatment:  Precautions:  PACEMAKER  Modalities: Vasocompression right shoulder, right shoulder sleeve, low compression, 34 degrees F x 15 min  Exercises:  Exercise Reps/ Time Weight/ Level Comments    Open book   10 B      Postural correction  10      SCap squeeze  10      Serratus punch 20  4lb   Side Lie ER with MM resistance/rhthmic stabs 10 reps     Sidelie ER and scaption 15  3lb   3 way shoulder  15   Resisted band -     Manual:  Thoracic PA and rotational mobs, GH mobs multidirectional grade 2-3, post cap MET, ST mobs  25 min       Other:  HEP   HEP issued with MARVA, sukhdev, RD, copy in softchart     Specific Instructions for next treatment:  Thoracic, [facility],

## 2018-10-24 ENCOUNTER — APPOINTMENT (OUTPATIENT)
Dept: PHYSICAL THERAPY | Age: 83
End: 2018-10-24
Payer: MEDICARE

## 2018-10-29 ENCOUNTER — HOSPITAL ENCOUNTER (OUTPATIENT)
Dept: PHYSICAL THERAPY | Age: 83
Setting detail: THERAPIES SERIES
Discharge: HOME | End: 2018-10-29
Payer: MEDICARE

## 2018-10-29 PROCEDURE — 97016 VASOPNEUMATIC DEVICE THERAPY: CPT

## 2018-10-29 PROCEDURE — 97140 MANUAL THERAPY 1/> REGIONS: CPT

## 2018-10-29 PROCEDURE — G8986 CARRY D/C STATUS: HCPCS

## 2018-10-29 PROCEDURE — G8985 CARRY GOAL STATUS: HCPCS

## 2018-10-29 PROCEDURE — 97110 THERAPEUTIC EXERCISES: CPT

## 2018-10-29 NOTE — DISCHARGE SUMMARY
[address] [facility]   [address]   Phone: [phone]   Fax: [phone]    Physical Therapy Daily Treatment and Discharge Note      Date:  10/29/2018  Patient Name:  Massiel Gómez    :  1935  MRN: [id number]   Physician: Sanna Garcia MD      Insurance: Medicare [employer]              Eligibility Status:  Eligible        Secondary Insurance (if applicable) [employer]              Eligibility Status: Eligible        DOS: 10/5/18  # of visits allowed/remaining: follow medicare guidelines  Source: Website  Spoke To:  RTE  Reference: Na  Auth: NPRe     Electronically signed by Toi Becker on 10/2/18 at 11:37 AM     Medical Diagnosis:  Other shoulder lesions, right shoulder                           Rehab Codes: M25.411, M25.511, M25.611, M54.2, R29.3,   Onset Date: 18                                  Next 's appt: tbd  Visit# / total visits: 6/10-  Cancels/No Shows: 0/0         MEDICARE CAP VISIT MAINTAINED IN NAVIGATOR      Subjective: Right shoulder doing much better.   Able to move with little to no pain  Pain:  [x] Yes  [] No Location: Right shoulder pain  N/A Pain Rating: (0-10 scale) 0-1/10  Pain altered Tx:  [] No  [] Yes  Action:  Comments:    Objective:     Right shoulder ROM:  WNL all planes    Strength:  Bilateral UE 5/5 throughout  Todays Treatment:  Precautions:  PACEMAKER  Modalities: Vasocompression right shoulder, right shoulder sleeve, low compression, 34 degrees F x 15 min  Exercises:  Exercise Reps/ Time Weight/ Level Comments    Open book   10 B      Postural correction  10      SCap squeeze  10      Serratus punch 20  4lb   Side Lie ER with MM resistance/rhthmic stabs 10 reps     Sidelie ER and scaption 15  3lb   3 way shoulder  15   Resisted band -     Manual:  Thoracic PA and rotational mobs, GH mobs multidirectional grade 2-3, post cap MET, ST mobs  25 min       Other:  HEP   HEP issued with HO,

## 2018-10-31 ENCOUNTER — HOSPITAL ENCOUNTER (OUTPATIENT)
Dept: PHYSICAL THERAPY | Age: 83
Setting detail: THERAPIES SERIES
End: 2018-10-31
Payer: MEDICARE

## 2018-11-13 NOTE — TELEPHONE ENCOUNTER
Last visit:5/1/18  Last Med refill:10/15/18    Next Visit Date:  No future appointments.     Health Maintenance   Topic Date Due    DTaP/Tdap/Td vaccine (1 - Tdap) 10/10/1956    Shingles Vaccine (1 of 2 - 2 Dose Series) 10/10/1987    Pneumococcal low/med risk (1 of 2 - PCV13) 10/10/2002    Potassium monitoring  06/20/2018    Creatinine monitoring  06/20/2018    Flu vaccine (1) 09/01/2018       Hemoglobin A1C (%)   Date Value   06/20/2017 5.2   04/04/2016 5.7   09/08/2015 5.5             ( goal A1C is < 7)   No results found for: LABMICR  LDL Cholesterol (mg/dL)   Date Value   10/25/2016 62   07/28/2016 40       (goal LDL is <100)   AST (U/L)   Date Value   06/20/2017 22     ALT (U/L)   Date Value   06/20/2017 18     BUN (mg/dL)   Date Value   06/20/2017 11     BP Readings from Last 3 Encounters:   05/01/18 132/76   08/22/17 138/80   06/20/17 138/80          (goal 120/80)    All Future Testing planned in [facility]  Lab Frequency Next Occurrence               Patient Active Problem List:     S/P CABG x 3 (2009)     CHF (congestive heart failure), NYHA class II ([facility])     S/P cardiac cath (4/24/14-Dr. 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 Razia Ferreira

## 2018-11-14 RX ORDER — FLUOXETINE 10 MG/1
CAPSULE ORAL
Qty: 30 CAPSULE | Refills: 0 | Status: SHIPPED | OUTPATIENT
Start: 2018-11-14 | End: 2019-02-25 | Stop reason: SDUPTHER

## 2018-11-20 RX ORDER — FLUOXETINE 10 MG/1
CAPSULE ORAL
Qty: 30 CAPSULE | Refills: 0 | Status: SHIPPED | OUTPATIENT
Start: 2018-11-20 | End: 2019-01-21 | Stop reason: SDUPTHER

## 2019-01-08 RX ORDER — FLUOXETINE 10 MG/1
CAPSULE ORAL
Qty: 30 CAPSULE | Refills: 0 | OUTPATIENT
Start: 2019-01-08

## 2019-01-14 RX ORDER — FLUOXETINE 10 MG/1
CAPSULE ORAL
Qty: 30 CAPSULE | Refills: 0 | OUTPATIENT
Start: 2019-01-14

## 2019-01-18 RX ORDER — FLUOXETINE 10 MG/1
CAPSULE ORAL
Qty: 30 CAPSULE | Refills: 0 | OUTPATIENT
Start: 2019-01-18

## 2019-01-21 RX ORDER — FLUOXETINE 10 MG/1
CAPSULE ORAL
Qty: 30 CAPSULE | Refills: 0 | Status: SHIPPED | OUTPATIENT
Start: 2019-01-21 | End: 2019-04-02 | Stop reason: ALTCHOICE

## 2019-01-24 RX ORDER — FLUOXETINE 10 MG/1
CAPSULE ORAL
Qty: 30 CAPSULE | Refills: 0 | OUTPATIENT
Start: 2019-01-24

## 2019-02-15 RX ORDER — FLUOXETINE 10 MG/1
CAPSULE ORAL
Qty: 30 CAPSULE | Refills: 0 | OUTPATIENT
Start: 2019-02-15

## 2019-02-25 RX ORDER — FLUOXETINE 10 MG/1
CAPSULE ORAL
Qty: 80 CAPSULE | Refills: 0 | Status: SHIPPED | OUTPATIENT
Start: 2019-02-25 | End: 2019-04-02 | Stop reason: ALTCHOICE

## 2019-03-25 RX ORDER — FLUOXETINE 10 MG/1
CAPSULE ORAL
Qty: 30 CAPSULE | Refills: 0 | OUTPATIENT
Start: 2019-03-25

## 2019-03-28 ENCOUNTER — TELEPHONE (OUTPATIENT)
Dept: INTERNAL MEDICINE CLINIC | Age: 84
End: 2019-03-28

## 2019-03-29 RX ORDER — CITALOPRAM 10 MG/1
10 TABLET ORAL DAILY
Qty: 90 TABLET | Refills: 0 | Status: SHIPPED | OUTPATIENT
Start: 2019-03-29 | End: 2019-07-21 | Stop reason: SDUPTHER

## 2019-04-02 ENCOUNTER — OFFICE VISIT (OUTPATIENT)
Dept: INTERNAL MEDICINE CLINIC | Age: 84
End: 2019-04-02
Payer: MEDICARE

## 2019-04-02 VITALS
OXYGEN SATURATION: 99 % | BODY MASS INDEX: 24.52 KG/M2 | HEIGHT: 72 IN | DIASTOLIC BLOOD PRESSURE: 70 MMHG | WEIGHT: 181 LBS | SYSTOLIC BLOOD PRESSURE: 110 MMHG | RESPIRATION RATE: 18 BRPM | HEART RATE: 71 BPM

## 2019-04-02 DIAGNOSIS — Z95.1 S/P CABG X 3: ICD-10-CM

## 2019-04-02 DIAGNOSIS — I25.10 CORONARY ARTERY DISEASE INVOLVING NATIVE CORONARY ARTERY OF NATIVE HEART WITHOUT ANGINA PECTORIS: Primary | ICD-10-CM

## 2019-04-02 DIAGNOSIS — Z12.5 SCREENING PSA (PROSTATE SPECIFIC ANTIGEN): ICD-10-CM

## 2019-04-02 DIAGNOSIS — Z95.810 S/P IMPLANTATION OF AUTOMATIC CARDIOVERTER/DEFIBRILLATOR (AICD): ICD-10-CM

## 2019-04-02 DIAGNOSIS — I10 ESSENTIAL HYPERTENSION: ICD-10-CM

## 2019-04-02 DIAGNOSIS — E78.2 MIXED HYPERLIPIDEMIA: ICD-10-CM

## 2019-04-02 DIAGNOSIS — N28.9 RENAL INSUFFICIENCY: ICD-10-CM

## 2019-04-02 DIAGNOSIS — I50.9 CONGESTIVE HEART FAILURE, NYHA CLASS 2, UNSPECIFIED CONGESTIVE HEART FAILURE TYPE (HCC): ICD-10-CM

## 2019-04-02 DIAGNOSIS — J45.20 MILD INTERMITTENT ASTHMA WITHOUT COMPLICATION: ICD-10-CM

## 2019-04-02 DIAGNOSIS — R73.01 IFG (IMPAIRED FASTING GLUCOSE): ICD-10-CM

## 2019-04-02 DIAGNOSIS — Z28.21 INFLUENZA VACCINATION DECLINED: ICD-10-CM

## 2019-04-02 DIAGNOSIS — D41.4 BLADDER POLYP: ICD-10-CM

## 2019-04-02 DIAGNOSIS — K21.9 GASTROESOPHAGEAL REFLUX DISEASE WITHOUT ESOPHAGITIS: ICD-10-CM

## 2019-04-02 DIAGNOSIS — Z98.890 S/P CARDIAC CATH: ICD-10-CM

## 2019-04-02 DIAGNOSIS — Z28.21 PNEUMOCOCCAL VACCINATION DECLINED: ICD-10-CM

## 2019-04-02 DIAGNOSIS — F41.9 ANXIETY: ICD-10-CM

## 2019-04-02 DIAGNOSIS — Z95.5 STENTED CORONARY ARTERY: ICD-10-CM

## 2019-04-02 DIAGNOSIS — M35.3 POLYMYALGIA RHEUMATICA (HCC): ICD-10-CM

## 2019-04-02 DIAGNOSIS — M35.3 POLYMYALGIA RHEUMATICA SYNDROME (HCC): ICD-10-CM

## 2019-04-02 PROCEDURE — 4040F PNEUMOC VAC/ADMIN/RCVD: CPT | Performed by: FAMILY MEDICINE

## 2019-04-02 PROCEDURE — G8598 ASA/ANTIPLAT THER USED: HCPCS | Performed by: FAMILY MEDICINE

## 2019-04-02 PROCEDURE — 4004F PT TOBACCO SCREEN RCVD TLK: CPT | Performed by: FAMILY MEDICINE

## 2019-04-02 PROCEDURE — G8427 DOCREV CUR MEDS BY ELIG CLIN: HCPCS | Performed by: FAMILY MEDICINE

## 2019-04-02 PROCEDURE — 1123F ACP DISCUSS/DSCN MKR DOCD: CPT | Performed by: FAMILY MEDICINE

## 2019-04-02 PROCEDURE — G8420 CALC BMI NORM PARAMETERS: HCPCS | Performed by: FAMILY MEDICINE

## 2019-04-02 PROCEDURE — 99214 OFFICE O/P EST MOD 30 MIN: CPT | Performed by: FAMILY MEDICINE

## 2019-04-02 ASSESSMENT — PATIENT HEALTH QUESTIONNAIRE - PHQ9
SUM OF ALL RESPONSES TO PHQ QUESTIONS 1-9: 0
2. FEELING DOWN, DEPRESSED OR HOPELESS: 0
SUM OF ALL RESPONSES TO PHQ9 QUESTIONS 1 & 2: 0
SUM OF ALL RESPONSES TO PHQ QUESTIONS 1-9: 0
1. LITTLE INTEREST OR PLEASURE IN DOING THINGS: 0

## 2019-04-02 ASSESSMENT — ENCOUNTER SYMPTOMS
BACK PAIN: 1
ALLERGIC/IMMUNOLOGIC NEGATIVE: 1
RESPIRATORY NEGATIVE: 1
GASTROINTESTINAL NEGATIVE: 1
EYES NEGATIVE: 1

## 2019-04-02 NOTE — PROGRESS NOTES
warm and dry. Psychiatric:   Anxiety/depression. He is compliant with her SSRI. Denies suicidality   Nursing note and vitals reviewed. Assessment:       Diagnosis Orders   1. Coronary artery disease involving native coronary artery of native heart without angina pectoris     2. 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 Titi Nation     3. S/P CABG x 3 (2009)     4. Congestive heart failure, NYHA class 2, unspecified congestive heart failure type (HCC)  CBC    Comprehensive Metabolic Panel    Hemoglobin A1C    Lipid Panel    Urinalysis with Microscopic    TSH without Reflex    Psa screening   5. S/P cardiac cath (4/24/14-Dr. Titi Nation)     6. Anxiety     7. Polymyalgia rheumatica syndrome ([address]     8. Mixed hyperlipidemia  CBC    Comprehensive Metabolic Panel    Hemoglobin A1C    Lipid Panel    Urinalysis with Microscopic    TSH without Reflex    Psa screening   9. Stented coronary artery     10. Gastroesophageal reflux disease without esophagitis     11. Polymyalgia rheumatica ([address]     12. IFG (impaired fasting glucose)  CBC    Comprehensive Metabolic Panel    Hemoglobin A1C    Lipid Panel    Urinalysis with Microscopic    TSH without Reflex    Psa screening   13. Renal insufficiency     14. Essential hypertension     15. Mild intermittent asthma without complication     16. Bladder polyp     17. Influenza vaccination declined     25. Pneumococcal vaccination declined             Plan:      61-year-old  male is brought in by his daughter for routine follow-up. He is afebrile hemodynamically stable, clinical examination is stable at baseline  Hypertension well-controlled to beta blocker and diuretic that he is tolerating well  History of neurocardiogenic syncope. He is not express any orthostatic signs. Continue Florinef. Fall precaution is advised. GERD stable on H2 blocker  Gout continue allopurinol. BPH clinically asymptomatic.   He continues with [facility]. Recently had cystoscopy done by urology was seen to have bladder polyp. He is scheduled for reevaluation and polypectomy and biopsy. He is asymptomatic  Underlying history of anxiety and depression. He denies suicidality. Continue SSRI that he is tolerating well. Vertigo stable on 80 were  History of coronary artery disease status post CABG ×3. He is established with cardiology. Risk factor stratification is advised  Underlying pacemaker/defibrillator. History of cardiac cath with stents  Polymyalgia rheumatica. Patient is on purpose on 3 mg daily as provided by rheumatology  Impaired fasting glucose. Chronic steroid use. Chronic kidney disease stage III. Avoid nephrotoxic drugs, anti-inflammatory to contrast.  Maintain optimal blood pressure. Degenerative polyarthralgia. Continue activity as tolerated with fall precaution  Safety counseling including but not limited to carbon monoxide/fire alarm, ample lighting, waiting loose clutter and staying in familiar environment. Once again he declined influenza and pneumococcal vaccine  Mild intermittent asthma without any recent flare. He denies being a smoker  Med list reviewed advised to continue  Further recommendations to follow  Further recommendations to follow labs  Stable congestive heart failure. Patient is diuresing well in negative fluid balance. He denies orthopnea paroxysmal nocturnal dyspnea or lower extremity edema or leg edema. This note is created with a voice recognition program and while intend to generate a document that accurately reflects the content of the visit, no guarantee can be provided that every mistake has been identified and corrected by editing.                 Deni Wang MD

## 2019-04-02 NOTE — PROGRESS NOTES
Chronic Disease Visit Information    BP Readings from Last 3 Encounters:   04/02/19 110/70   05/01/18 132/76   08/22/17 138/80          Hemoglobin A1C (%)   Date Value   06/20/2017 5.2   04/04/2016 5.7   09/08/2015 5.5     LDL Cholesterol (mg/dL)   Date Value   10/25/2016 62     HDL (mg/dL)   Date Value   10/25/2016 68     BUN (mg/dL)   Date Value   06/20/2017 11     CREATININE (mg/dL)   Date Value   06/20/2017 1.23 (H)     Glucose (mg/dL)   Date Value   06/20/2017 108 (H)   01/31/2012 122 (H)            Have you changed or started any medications since your last visit including any over-the-counter medicines, vitamins, or herbal medicines? no   Are you having any side effects from any of your medications? -  no  Have you stopped taking any of your medications? Is so, why? -  no    Have you seen any other physician or provider since your last visit? No  Have you had any other diagnostic tests since your last visit? No  Have you been seen in the emergency room and/or had an admission to a hospital since we last saw you? No  Have you had your annual diabetic retinal (eye) exam? No  Have you had your routine dental cleaning in the past 6 months? no    Have you activated your [employer] account? If not, what are your barriers?  Yes     Patient Care Team:  Mahendra Morales MD as PCP - General (Family Medicine)  Mahendra Morales MD as PCP - S Attributed Provider  Brinda Carlos MD as Consulting Physician (Cardiology)  Jossue Sheets MD as Consulting Physician (Rheumatology)         Medical History Review  Past Medical, Family, and Social History reviewed and does not contribute to the patient presenting condition    Health Maintenance   Topic Date Due    Shingles Vaccine (1 of 2) 04/02/2020 (Originally 10/10/1987)    DTaP/Tdap/Td vaccine (1 - Tdap) 04/23/2020 (Originally 10/10/1956)    Pneumococcal 65+ years Vaccine (1 of 2 - PCV13) 04/24/2020 (Originally 10/10/2002)    Potassium monitoring  04/24/2020 (Originally 6/20/2018)    Creatinine monitoring  04/24/2020 (Originally 6/20/2018)    Flu vaccine (Season Ended) 09/01/2019

## 2019-04-04 ENCOUNTER — HOSPITAL ENCOUNTER (OUTPATIENT)
Age: 84
Discharge: HOME | End: 2019-04-04
Payer: MEDICARE

## 2019-04-04 DIAGNOSIS — I50.9 CONGESTIVE HEART FAILURE, NYHA CLASS 2, UNSPECIFIED CONGESTIVE HEART FAILURE TYPE (HCC): ICD-10-CM

## 2019-04-04 DIAGNOSIS — R73.01 IFG (IMPAIRED FASTING GLUCOSE): ICD-10-CM

## 2019-04-04 DIAGNOSIS — Z12.5 SCREENING PSA (PROSTATE SPECIFIC ANTIGEN): ICD-10-CM

## 2019-04-04 DIAGNOSIS — E78.2 MIXED HYPERLIPIDEMIA: ICD-10-CM

## 2019-04-04 LAB
-: ABNORMAL
ALBUMIN SERPL-MCNC: 3.9 G/DL (ref 3.5–5.2)
ALBUMIN/GLOBULIN RATIO: ABNORMAL (ref 1–2.5)
ALP BLD-CCNC: 87 U/L (ref 40–129)
ALT SERPL-CCNC: 16 U/L (ref 5–41)
AMORPHOUS: ABNORMAL
ANION GAP SERPL CALCULATED.3IONS-SCNC: 11 MMOL/L (ref 9–17)
AST SERPL-CCNC: 17 U/L
BACTERIA: ABNORMAL
BILIRUB SERPL-MCNC: 0.43 MG/DL (ref 0.3–1.2)
BILIRUBIN URINE: NEGATIVE
BUN BLDV-MCNC: 20 MG/DL (ref 8–23)
BUN/CREAT BLD: ABNORMAL (ref 9–20)
CALCIUM SERPL-MCNC: 9.3 MG/DL (ref 8.6–10.4)
CASTS UA: ABNORMAL /LPF
CHLORIDE BLD-SCNC: 105 MMOL/L (ref 98–107)
CHOLESTEROL/HDL RATIO: 4
CHOLESTEROL: 198 MG/DL
CO2: 28 MMOL/L (ref 20–31)
COLOR: YELLOW
COMMENT UA: ABNORMAL
CREAT SERPL-MCNC: 1.29 MG/DL (ref 0.7–1.2)
CRYSTALS, UA: ABNORMAL /HPF
EPITHELIAL CELLS UA: ABNORMAL /HPF
ESTIMATED AVERAGE GLUCOSE: 105 MG/DL
GFR AFRICAN AMERICAN: >60 ML/MIN
GFR NON-AFRICAN AMERICAN: 53 ML/MIN
GFR SERPL CREATININE-BSD FRML MDRD: ABNORMAL ML/MIN/{1.73_M2}
GFR SERPL CREATININE-BSD FRML MDRD: ABNORMAL ML/MIN/{1.73_M2}
GLUCOSE BLD-MCNC: 101 MG/DL (ref 70–99)
GLUCOSE URINE: NEGATIVE
HBA1C MFR BLD: 5.3 % (ref 4–6)
HCT VFR BLD CALC: 44.3 % (ref 41–53)
HDLC SERPL-MCNC: 50 MG/DL
HEMOGLOBIN: 14.6 G/DL (ref 13.5–17.5)
KETONES, URINE: NEGATIVE
LDL CHOLESTEROL: 134 MG/DL (ref 0–130)
LEUKOCYTE ESTERASE, URINE: NEGATIVE
MCH RBC QN AUTO: 30.8 PG (ref 26–34)
MCHC RBC AUTO-ENTMCNC: 33 G/DL (ref 31–37)
MCV RBC AUTO: 93.1 FL (ref 80–100)
MUCUS: ABNORMAL
NITRITE, URINE: NEGATIVE
NRBC AUTOMATED: NORMAL PER 100 WBC
OTHER OBSERVATIONS UA: ABNORMAL
PDW BLD-RTO: 14.2 % (ref 11.5–14.9)
PH UA: 6 (ref 5–8)
PLATELET # BLD: 211 K/UL (ref 150–450)
PMV BLD AUTO: 7.7 FL (ref 6–12)
POTASSIUM SERPL-SCNC: 4.3 MMOL/L (ref 3.7–5.3)
PROSTATE SPECIFIC ANTIGEN: 0.33 UG/L
PROTEIN UA: NEGATIVE
RBC # BLD: 4.76 M/UL (ref 4.5–5.9)
RBC UA: ABNORMAL /HPF
RENAL EPITHELIAL, UA: ABNORMAL /HPF
SODIUM BLD-SCNC: 144 MMOL/L (ref 135–144)
SPECIFIC GRAVITY UA: 1.02 (ref 1–1.03)
TOTAL PROTEIN: 7.1 G/DL (ref 6.4–8.3)
TRICHOMONAS: ABNORMAL
TRIGL SERPL-MCNC: 69 MG/DL
TSH SERPL DL<=0.05 MIU/L-ACNC: 6.82 MIU/L (ref 0.3–5)
TURBIDITY: CLEAR
URINE HGB: NEGATIVE
UROBILINOGEN, URINE: NORMAL
VLDLC SERPL CALC-MCNC: ABNORMAL MG/DL (ref 1–30)
WBC # BLD: 7.8 K/UL (ref 3.5–11)
WBC UA: ABNORMAL /HPF
YEAST: ABNORMAL

## 2019-04-04 PROCEDURE — 80053 COMPREHEN METABOLIC PANEL: CPT

## 2019-04-04 PROCEDURE — 84443 ASSAY THYROID STIM HORMONE: CPT

## 2019-04-04 PROCEDURE — G0103 PSA SCREENING: HCPCS

## 2019-04-04 PROCEDURE — 81001 URINALYSIS AUTO W/SCOPE: CPT

## 2019-04-04 PROCEDURE — 80061 LIPID PANEL: CPT

## 2019-04-04 PROCEDURE — 83036 HEMOGLOBIN GLYCOSYLATED A1C: CPT

## 2019-04-04 PROCEDURE — 85027 COMPLETE CBC AUTOMATED: CPT

## 2019-04-04 PROCEDURE — 36415 COLL VENOUS BLD VENIPUNCTURE: CPT

## 2019-04-12 RX ORDER — FLUOXETINE 10 MG/1
CAPSULE ORAL
Qty: 30 CAPSULE | Refills: 0 | OUTPATIENT
Start: 2019-04-12

## 2019-04-16 ENCOUNTER — TELEPHONE (OUTPATIENT)
Dept: INTERNAL MEDICINE CLINIC | Age: 84
End: 2019-04-16

## 2019-04-30 NOTE — PROGRESS NOTES
Called [facility] [phone] regarding pacer/defib. Spoke with Suzanne 38 and she stated to use magnet if cautery is used,if anesthesia has further questions they need to contact [facility] with phone number from above.

## 2019-05-06 ENCOUNTER — ANESTHESIA EVENT (OUTPATIENT)
Dept: OPERATING ROOM | Age: 84
End: 2019-05-06
Payer: MEDICARE

## 2019-05-07 ENCOUNTER — APPOINTMENT (OUTPATIENT)
Dept: GENERAL RADIOLOGY | Age: 84
End: 2019-05-07
Attending: UROLOGY
Payer: MEDICARE

## 2019-05-07 ENCOUNTER — HOSPITAL ENCOUNTER (OUTPATIENT)
Age: 84
Setting detail: OUTPATIENT SURGERY
Discharge: HOME | End: 2019-05-07
Attending: UROLOGY | Admitting: UROLOGY
Payer: MEDICARE

## 2019-05-07 ENCOUNTER — ANESTHESIA (OUTPATIENT)
Dept: OPERATING ROOM | Age: 84
End: 2019-05-07
Payer: MEDICARE

## 2019-05-07 VITALS — DIASTOLIC BLOOD PRESSURE: 66 MMHG | SYSTOLIC BLOOD PRESSURE: 136 MMHG | OXYGEN SATURATION: 100 %

## 2019-05-07 VITALS
DIASTOLIC BLOOD PRESSURE: 70 MMHG | HEART RATE: 62 BPM | SYSTOLIC BLOOD PRESSURE: 142 MMHG | OXYGEN SATURATION: 97 % | HEIGHT: 72 IN | WEIGHT: 181.1 LBS | TEMPERATURE: 97.7 F | BODY MASS INDEX: 24.53 KG/M2 | RESPIRATION RATE: 17 BRPM

## 2019-05-07 DIAGNOSIS — D41.4 BLADDER POLYP: Primary | ICD-10-CM

## 2019-05-07 LAB
-: NORMAL
AMORPHOUS: NORMAL
BACTERIA: NORMAL
BILIRUBIN URINE: NEGATIVE
CASTS UA: NORMAL /LPF
COLOR: YELLOW
COMMENT UA: ABNORMAL
CRYSTALS, UA: NORMAL /HPF
EPITHELIAL CELLS UA: NORMAL /HPF (ref 0–5)
GLUCOSE URINE: NEGATIVE
KETONES, URINE: NEGATIVE
LEUKOCYTE ESTERASE, URINE: NEGATIVE
MUCUS: NORMAL
NITRITE, URINE: NEGATIVE
OTHER OBSERVATIONS UA: NORMAL
PH UA: 7 (ref 5–8)
PROTEIN UA: NEGATIVE
RBC UA: NORMAL /HPF (ref 0–2)
RENAL EPITHELIAL, UA: NORMAL /HPF
SPECIFIC GRAVITY UA: 1.02 (ref 1–1.03)
TRICHOMONAS: NORMAL
TURBIDITY: CLEAR
URINE HGB: ABNORMAL
UROBILINOGEN, URINE: NORMAL
WBC UA: NORMAL /HPF (ref 0–5)
YEAST: NORMAL

## 2019-05-07 PROCEDURE — 2720000010 HC SURG SUPPLY STERILE: Performed by: UROLOGY

## 2019-05-07 PROCEDURE — 2709999900 HC NON-CHARGEABLE SUPPLY: Performed by: UROLOGY

## 2019-05-07 PROCEDURE — 7100000010 HC PHASE II RECOVERY - FIRST 15 MIN: Performed by: UROLOGY

## 2019-05-07 PROCEDURE — 2580000003 HC RX 258: Performed by: ANESTHESIOLOGY

## 2019-05-07 PROCEDURE — 88307 TISSUE EXAM BY PATHOLOGIST: CPT

## 2019-05-07 PROCEDURE — 3600000012 HC SURGERY LEVEL 2 ADDTL 15MIN: Performed by: UROLOGY

## 2019-05-07 PROCEDURE — 3600000002 HC SURGERY LEVEL 2 BASE: Performed by: UROLOGY

## 2019-05-07 PROCEDURE — 3700000000 HC ANESTHESIA ATTENDED CARE: Performed by: UROLOGY

## 2019-05-07 PROCEDURE — 6360000002 HC RX W HCPCS: Performed by: NURSE ANESTHETIST, CERTIFIED REGISTERED

## 2019-05-07 PROCEDURE — 7100000011 HC PHASE II RECOVERY - ADDTL 15 MIN: Performed by: UROLOGY

## 2019-05-07 PROCEDURE — 7100000000 HC PACU RECOVERY - FIRST 15 MIN: Performed by: UROLOGY

## 2019-05-07 PROCEDURE — 6360000002 HC RX W HCPCS: Performed by: UROLOGY

## 2019-05-07 PROCEDURE — 81001 URINALYSIS AUTO W/SCOPE: CPT

## 2019-05-07 PROCEDURE — 2500000003 HC RX 250 WO HCPCS: Performed by: NURSE ANESTHETIST, CERTIFIED REGISTERED

## 2019-05-07 PROCEDURE — 71046 X-RAY EXAM CHEST 2 VIEWS: CPT

## 2019-05-07 PROCEDURE — 7100000001 HC PACU RECOVERY - ADDTL 15 MIN: Performed by: UROLOGY

## 2019-05-07 PROCEDURE — 3700000001 HC ADD 15 MINUTES (ANESTHESIA): Performed by: UROLOGY

## 2019-05-07 RX ORDER — ONDANSETRON 2 MG/ML
INJECTION INTRAMUSCULAR; INTRAVENOUS PRN
Status: DISCONTINUED | OUTPATIENT
Start: 2019-05-07 | End: 2019-05-07 | Stop reason: SDUPTHER

## 2019-05-07 RX ORDER — LIDOCAINE HYDROCHLORIDE 10 MG/ML
1 INJECTION, SOLUTION EPIDURAL; INFILTRATION; INTRACAUDAL; PERINEURAL
Status: DISCONTINUED | OUTPATIENT
Start: 2019-05-08 | End: 2019-05-07 | Stop reason: HOSPADM

## 2019-05-07 RX ORDER — PROPOFOL 10 MG/ML
INJECTION, EMULSION INTRAVENOUS PRN
Status: DISCONTINUED | OUTPATIENT
Start: 2019-05-07 | End: 2019-05-07 | Stop reason: SDUPTHER

## 2019-05-07 RX ORDER — EPHEDRINE SULFATE/0.9% NACL/PF 50 MG/5 ML
SYRINGE (ML) INTRAVENOUS PRN
Status: DISCONTINUED | OUTPATIENT
Start: 2019-05-07 | End: 2019-05-07 | Stop reason: SDUPTHER

## 2019-05-07 RX ORDER — CEPHALEXIN 500 MG/1
500 CAPSULE ORAL 2 TIMES DAILY
Qty: 14 CAPSULE | Refills: 0 | Status: SHIPPED | OUTPATIENT
Start: 2019-05-07 | End: 2019-05-14

## 2019-05-07 RX ORDER — HYDROCODONE BITARTRATE AND ACETAMINOPHEN 5; 325 MG/1; MG/1
1 TABLET ORAL EVERY 8 HOURS PRN
Qty: 12 TABLET | Refills: 0 | Status: SHIPPED | OUTPATIENT
Start: 2019-05-07 | End: 2019-05-10

## 2019-05-07 RX ORDER — SODIUM CHLORIDE, SODIUM LACTATE, POTASSIUM CHLORIDE, CALCIUM CHLORIDE 600; 310; 30; 20 MG/100ML; MG/100ML; MG/100ML; MG/100ML
INJECTION, SOLUTION INTRAVENOUS CONTINUOUS
Status: DISCONTINUED | OUTPATIENT
Start: 2019-05-08 | End: 2019-05-07 | Stop reason: HOSPADM

## 2019-05-07 RX ORDER — FENTANYL CITRATE 50 UG/ML
INJECTION, SOLUTION INTRAMUSCULAR; INTRAVENOUS PRN
Status: DISCONTINUED | OUTPATIENT
Start: 2019-05-07 | End: 2019-05-07 | Stop reason: SDUPTHER

## 2019-05-07 RX ORDER — CEFAZOLIN SODIUM 2 G/50ML
2 SOLUTION INTRAVENOUS ONCE
Status: COMPLETED | OUTPATIENT
Start: 2019-05-07 | End: 2019-05-07

## 2019-05-07 RX ADMIN — PHENYLEPHRINE HYDROCHLORIDE 50 MCG: 10 INJECTION INTRAVENOUS at 10:30

## 2019-05-07 RX ADMIN — SODIUM CHLORIDE, POTASSIUM CHLORIDE, SODIUM LACTATE AND CALCIUM CHLORIDE: 600; 310; 30; 20 INJECTION, SOLUTION INTRAVENOUS at 10:14

## 2019-05-07 RX ADMIN — PROPOFOL 90 MG: 10 INJECTION, EMULSION INTRAVENOUS at 10:19

## 2019-05-07 RX ADMIN — SODIUM CHLORIDE, POTASSIUM CHLORIDE, SODIUM LACTATE AND CALCIUM CHLORIDE: 600; 310; 30; 20 INJECTION, SOLUTION INTRAVENOUS at 08:35

## 2019-05-07 RX ADMIN — Medication 10 MG: at 10:39

## 2019-05-07 RX ADMIN — CEFAZOLIN SODIUM 2 G: 2 SOLUTION INTRAVENOUS at 10:27

## 2019-05-07 RX ADMIN — Medication 50 MCG: at 10:19

## 2019-05-07 RX ADMIN — ONDANSETRON 4 MG: 2 INJECTION, SOLUTION INTRAMUSCULAR; INTRAVENOUS at 10:45

## 2019-05-07 RX ADMIN — PHENYLEPHRINE HYDROCHLORIDE 50 MCG: 10 INJECTION INTRAVENOUS at 10:34

## 2019-05-07 ASSESSMENT — PULMONARY FUNCTION TESTS
PIF_VALUE: 12
PIF_VALUE: 2
PIF_VALUE: 12
PIF_VALUE: 12
PIF_VALUE: 1
PIF_VALUE: 13
PIF_VALUE: 3
PIF_VALUE: 1
PIF_VALUE: 12
PIF_VALUE: 1
PIF_VALUE: 1
PIF_VALUE: 10
PIF_VALUE: 13
PIF_VALUE: 12
PIF_VALUE: 1
PIF_VALUE: 0
PIF_VALUE: 12
PIF_VALUE: 1
PIF_VALUE: 1
PIF_VALUE: 12
PIF_VALUE: 1
PIF_VALUE: 3
PIF_VALUE: 2
PIF_VALUE: 1
PIF_VALUE: 12
PIF_VALUE: 4
PIF_VALUE: 12
PIF_VALUE: 4

## 2019-05-07 ASSESSMENT — PAIN SCALES - GENERAL
PAINLEVEL_OUTOF10: 0

## 2019-05-07 ASSESSMENT — PAIN - FUNCTIONAL ASSESSMENT: PAIN_FUNCTIONAL_ASSESSMENT: 0-10

## 2019-05-07 NOTE — OP NOTE
[facility]    Operative Note    Giancarlo Glass  YOB: 1935  [id number]      Pre-operative Diagnosis: Bladder tumor prostate hypertrophy    Post-operative Diagnosis: Same    Procedure: Transurethral resection small bladder tumors bladder floor    Anesthesia: General    Surgeons/Assistants: Dr Tonia Beyer    Estimated Blood Loss: less than 50     Complications: None    Specimens: Was Obtained: Urine, bladder tumor    Indications:82year-old male with microscopic hematuria diagnosed with bladder cancer tumors on the bladder floor Near the left ureteral orifice. Discussed with the patient transurethral resection, medical clearance was obtained    Operative Findings: Patient was brought to the operating room, positioned in dorsal lithotomy, proper patient identification, procedure identification. Prepping and draping in the usual sterile manner under general anesthesia. We entered the bladder with the resectoscope, examination of the bladder revealed evidence of bladder tumors on the bladder floor near the left ureteral orifice tumor measured 1.5 cm, a second tumor close to it measured 1 cm. We proceeded with resection of the tumor, biopsy of the base of the tumor was carried out prior to cauterization to identify submucosal or muscle invasion. At the completion we cauterized the sites of resection there was no residual bleeding patient tolerated the procedure well he was returned to recovery room in stable condition.     Recommendation follow-up visit at the office to discuss pathology report    Electronically signed by Clifton Denver, MD on 5/7/2019 at 10:53 AM

## 2019-05-07 NOTE — ANESTHESIA POSTPROCEDURE EVALUATION
Department of Anesthesiology  Postprocedure Note    Patient: Tr Bowie  MRN: [id number]  YOB: 1935  Date of evaluation: 5/7/2019  Time:  2:28 PM     Procedure Summary     Date:  05/07/19 Room / Location:  [facility] / [facility] OR    Anesthesia Start:  2398 Anesthesia Stop:  1055    Procedure:  CYSTOSCOPY TRANSURETHRAL RESECTION BLADDER (N/A ) Diagnosis:  (DX BLADDER TUMOR)    Surgeon:  Angelina Saunders MD Responsible Provider:  BEN Zimmerman CRNA    Anesthesia Type:  general ASA Status:  4          Anesthesia Type: general    Karol Phase I: Karol Score: 10    Karol Phase II: Karol Score: 10    Last vitals: Reviewed and per EMR flowsheets.        Anesthesia Post Evaluation

## 2019-05-07 NOTE — ANESTHESIA PRE PROCEDURE
5/8/2019] lidocaine PF 1 % injection 1 mL  1 mL Intradermal Once PRN Demarcus Heller MD           Allergies:  No Known Allergies    Problem List:    Patient Active Problem List   Diagnosis Code    S/P CABG x 3 (2009) Z95.1    CHF (congestive heart failure), NYHA class II ([facility] I50.9    S/P cardiac cath (4/24/14-Dr. 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 Rosibel Bull Z95.810    Bladder polyp D41.4    Pneumococcal vaccination declined Z35.24    Influenza vaccination declined Z35.24       Past Medical History:        Diagnosis Date    Abnormal tilt table test 08/2016    POSITIVE AFTER SYNCOPAL EPISODE    Arthritis     Bradycardia     CAD (coronary artery disease)     [facility]     COPD (chronic obstructive pulmonary disease) ([facility]     DVT (deep vein thrombosis) in pregnancy ([facility] 10/2014    EVIDENCE OF LT FEMORAL AND POPLITEAL DVT / STARTED ON ANTICOAGULATION     Frequent PVCs     GERD (gastroesophageal reflux disease)     Heart attack ([facility] 2000    Hx of blood clots     clot in left leg 3 years ago    Hyperlipidemia     Hypertension     Kidney stones 2009?     patient states passed    KENDELL (obstructive sleep apnea)     MILD KENDELL B SLEEP SUDY    SOB (shortness of breath)     Syncopal episodes        Past Surgical History:        Procedure Laterality Date    CARDIAC DEFIBRILLATOR PLACEMENT  01/30/2017    BI-V ICD DR Mare Chavez DEFIBRILLATOR PLACEMENT  01/30/2017    CRT    CARDIAC DEFIBRILLATOR PLACEMENT  01/30/2017    CRT    CARDIAC SURGERY  7/12/2000    CABG X3 LIMA-LAD, SVG-DIAG DBG-RCA DR. Julianne Diez COLONOSCOPY      CORONARY ANGIOPLASTY WITH STENT PLACEMENT  2003    X1 STENT DR Sugar Maharaj CYSTOSCOPY Right 10/06/2014    cysto with stent    DIAGNOSTIC CARDIAC CATH LAB PROCEDURE  11/2005 - 2/2014    BOTH TIMES SHOWING ALL 3 GRAFTS PATENT WITH OCCLUDED LCX / ATTEMPTED PCI TO LCX WAS UNSUCCESSFUL    PACEMAKER PLACEMENT         Social History:    Social History     Tobacco Use    Smoking status: Former Smoker    Smokeless tobacco: Current User     Types: Chew   Substance Use Topics    Alcohol use: No     Comment: socially                                Ready to quit: Not Answered  Counseling given: Not Answered      Vital Signs (Current):   Vitals:    05/07/19 0749   BP: (!) 148/74   Pulse: 74   Resp: 16   Temp: 97.3 °F (36.3 °C)   TempSrc: Oral   SpO2: 99%   Weight: 181 lb 1.6 oz (82.1 kg)   Height: 6' (1.829 m)                                              BP Readings from Last 3 Encounters:   05/07/19 (!) 148/74   04/02/19 110/70   05/01/18 132/76       NPO Status: Time of last liquid consumption: 2000                        Time of last solid consumption: 1700                        Date of last liquid consumption: 05/06/19                        Date of last solid food consumption: 05/06/19    BMI:   Wt Readings from Last 3 Encounters:   05/07/19 181 lb 1.6 oz (82.1 kg)   04/02/19 181 lb (82.1 kg)   05/01/18 187 lb 3.2 oz (84.9 kg)     Body mass index is 24.56 kg/m².     CBC:   Lab Results   Component Value Date    WBC 7.8 04/04/2019    RBC 4.76 04/04/2019    RBC 4.85 01/31/2012    HGB 14.6 04/04/2019    HCT 44.3 04/04/2019    MCV 93.1 04/04/2019    RDW 14.2 04/04/2019     04/04/2019     01/31/2012       CMP:   Lab Results   Component Value Date     04/04/2019    K 4.3 04/04/2019     04/04/2019    CO2 28 04/04/2019    BUN 20 04/04/2019    CREATININE 1.29 04/04/2019    GFRAA >60 04/04/2019    LABGLOM 53 04/04/2019    GLUCOSE 101 04/04/2019    GLUCOSE 122 01/31/2012    PROT 7.1 04/04/2019    CALCIUM 9.3 04/04/2019    BILITOT 0.43 04/04/2019    ALKPHOS 87 04/04/2019    AST 17 04/04/2019    ALT 16 04/04/2019       POC Tests: No results for input(s): POCGLU, POCNA, POCK, POCCL, POCBUN, POCHEMO, POCHCT in the last 72 hours. Coags:   Lab Results   Component Value Date    PROTIME 10.6 08/29/2016    INR 1.0 08/29/2016    APTT 22.4 08/29/2016       HCG (If Applicable): No results found for: PREGTESTUR, PREGSERUM, HCG, HCGQUANT     ABGs: No results found for: PHART, PO2ART, EMA2ZOZ, PCA9XPX, BEART, F2FNGOFF     Type & Screen (If Applicable):  No results found for: LABABO, [address]    Anesthesia Evaluation  Patient summary reviewed and Nursing notes reviewed  Airway: Mallampati: II  TM distance: >3 FB   Neck ROM: full  Mouth opening: > = 3 FB Dental: normal exam   (+) lower dentures and upper dentures      Pulmonary:normal exam  breath sounds clear to auscultation                             Cardiovascular:  Exercise tolerance: good (>4 METS),           Rhythm: regular  Rate: normal                    Neuro/Psych:               GI/Hepatic/Renal:             Endo/Other:                     Abdominal:       Abdomen: soft. Vascular:                                        Anesthesia Plan      general     ASA 4       Induction: intravenous. MIPS: Postoperative opioids intended and Prophylactic antiemetics administered. Anesthetic plan and risks discussed with patient. Use of blood products discussed with patient whom consented to blood products. Plan discussed with attending and CRNA.     Attending anesthesiologist reviewed and agrees with Naun Sky MD   5/7/2019

## 2019-05-07 NOTE — H&P
01/30/2017    BI-V ICD DR Bennie Anton    CARDIAC DEFIBRILLATOR PLACEMENT  01/30/2017    CRT    CARDIAC DEFIBRILLATOR PLACEMENT  01/30/2017    CRT    CARDIAC SURGERY  7/12/2000    CABG X3 LIMA-LAD, SVG-DIAG DBG-RCA DR. Miriam Valdez COLONOSCOPY      CORONARY ANGIOPLASTY WITH STENT PLACEMENT  2003    X1 STENT DR Bill Cummins CYSTOSCOPY Right 10/06/2014    cysto with stent    DIAGNOSTIC CARDIAC CATH LAB PROCEDURE  11/2005 - 2/2014    BOTH TIMES SHOWING ALL 3 GRAFTS PATENT WITH OCCLUDED LCX / ATTEMPTED PCI TO LCX WAS UNSUCCESSFUL    PACEMAKER PLACEMENT          Medications Prior to Admission:     Prior to Admission medications    Medication Sig Start Date End Date Taking? Authorizing Provider   citalopram (CELEXA) 10 MG tablet Take 1 tablet by mouth daily 3/29/19  Yes Castro Ge MD   trospium (SANCTURA) 20 MG tablet Take 20 mg by mouth 2 times daily   Yes Historical Provider, MD   fludrocortisone (FLORINEF) 0.1 MG tablet TAKE 1 TABLET BY MOUTH ONE TIME A DAY 3/13/17  Yes Castro Ge MD   metoprolol tartrate (LOPRESSOR) 25 MG tablet Take 1 tablet by mouth 2 times daily 10/17/16  Yes Castro Ge MD   allopurinol (ZYLOPRIM) 100 MG tablet daily Indications: TAKES DAILY  11/6/15  Yes Historical Provider, MD   predniSONE (DELTASONE) 1 MG tablet Take 3 mg by mouth daily    Yes Historical Provider, MD   fluocinonide (LIDEX) 0.05 % cream as needed  2/23/15  Yes Historical Provider, MD   aspirin 81 MG tablet Take 81 mg by mouth daily. Yes Historical Provider, MD   meclizine (ANTIVERT) 25 MG tablet Take 25 mg by mouth as needed for Dizziness     Historical Provider, MD   famotidine (PEPCID) 20 MG tablet Take 1 tablet by mouth 2 times daily 9/2/16   Ed Burgess MD   nitroGLYCERIN (NITROSTAT) 0.4 MG SL tablet Place 0.4 mg under the tongue every 5 minutes as needed for Chest pain. Historical Provider, MD        Allergies:     Patient has no known allergies.     Social History:     Tobacco: reports that he has quit smoking. His smokeless tobacco use includes chew. Alcohol:      reports that he does not drink alcohol. Drug Use:  reports that he does not use drugs. Family History:     Family History   Problem Relation Age of Onset    Heart Attack Father        Review of Systems:     Positive and Negative as described in HPI. CONSTITUTIONAL:  negative for fevers, chills, sweats, fatigue, weight loss  HEENT:  negative for vision, hearing changes, runny nose, throat pain  RESPIRATORY: sleep apnea  negative for shortness of breath, cough, congestion, wheezing. CARDIOVASCULAR: See HPI  negative for chest pain, palpitations. GASTROINTESTINAL:  negative for nausea, vomiting, diarrhea, constipation, change in bowel habits, abdominal pain   GENITOURINARY: SEE HPI   negative for difficulty of urination, burning with urination, +frequency and retention  INTEGUMENT:  negative for rash, skin lesions, easy bruising   HEMATOLOGIC/LYMPHATIC:  negative for swelling/edema   ALLERGIC/IMMUNOLOGIC:  negative for urticaria , itching  ENDOCRINE:  negative increase in drinking, increase in urination, hot or cold intolerance  MUSCULOSKELETAL:  negative joint pains, muscle aches, swelling of joints  NEUROLOGICAL:  negative for headaches, dizziness, lightheadedness, numbness, pain, tingling extremities  BEHAVIOR/PSYCH:  negative for depression, anxiety    Physical Exam:   BP (!) 148/74   Pulse 74   Temp 97.3 °F (36.3 °C) (Oral)   Resp 16   Ht 6' (1.829 m)   Wt 181 lb 1.6 oz (82.1 kg)   SpO2 99%   BMI 24.56 kg/m²   No LMP for male patient. No obstetric history on file. No results for input(s): POCGLU in the last 72 hours.     General Appearance:  alert, well appearing, and in no acute distress  Mental status: oriented to person, place, and time with normal affect  Head:  normocephalic, atraumatic, face symmetrical .  Eye: no icterus, redness, pupils equal and reactive, extraocular eye movements intact, conjunctiva clear  Ear: mild [place]   normal external ear, no discharge, hearing intact  Nose:  no drainage noted  Mouth: mucous membranes moist  Full dentures   Neck: supple, no carotid bruits, thyroid not palpable  Lungs: Bilateral equal air entry, clear to ausculation, no wheezing, rales or rhonchi, normal effort  Cardiovascular: AICD left anterior chest wall  normal rate, regular rhythm, no murmur, gallop, rub. Abdomen: Soft, nontender, nondistended, normal bowel sounds, no hepatomegaly or splenomegaly  Neurologic: There are no new focal motor or sensory deficits, normal muscle tone and bulk, no abnormal sensation, normal speech, cranial nerves II through XII grossly intact    Skin: No gross lesions, rashes, bruising or bleeding on exposed skin area  Extremities:  peripheral pulses palpable, no pedal edema or calf pain with palpation  Psych: normal affect     Investigations:      Laboratory Testing:  No results found for this or any previous visit (from the past 24 hour(s)). No results for input(s): HGB, HCT, WBC, MCV, PLATELET, NA, K, CL, CO2, BUN, CREATININE, GLUCOSE, INR, PROTIME, APTT, AST, ALT, LABALBU, HCG in the last 720 hours. Imaging/Diagnostics:      Diagnosis:      1. Bladder tumor    Plans:     1.  Cystoscopy TURP      BEN Omalley CNP  5/7/2019  8:18 AM

## 2019-05-08 LAB — SURGICAL PATHOLOGY REPORT: NORMAL

## 2019-05-08 RX ORDER — CITALOPRAM 10 MG/1
TABLET ORAL
Qty: 90 TABLET | Refills: 0 | OUTPATIENT
Start: 2019-05-08

## 2019-05-13 RX ORDER — FLUOXETINE 10 MG/1
CAPSULE ORAL
Qty: 30 CAPSULE | Refills: 0 | OUTPATIENT
Start: 2019-05-13

## 2019-07-23 RX ORDER — CITALOPRAM 10 MG/1
TABLET ORAL
Qty: 30 TABLET | Refills: 0 | Status: SHIPPED | OUTPATIENT
Start: 2019-07-23 | End: 2019-08-15 | Stop reason: SDUPTHER

## 2019-07-23 NOTE — TELEPHONE ENCOUNTER
Next Visit Date:  No future appointments.     Health Maintenance   Topic Date Due    Annual Wellness Visit (AWV)  10/10/2000    Shingles Vaccine (1 of 2) 04/02/2020 (Originally 10/10/1987)    DTaP/Tdap/Td vaccine (1 - Tdap) 04/23/2020 (Originally 10/10/1956)    Pneumococcal 65+ years Vaccine (1 of 2 - PCV13) 04/24/2020 (Originally 10/10/2002)    Flu vaccine (1) 09/01/2019    Potassium monitoring  04/04/2020    Creatinine monitoring  04/04/2020       Hemoglobin A1C (%)   Date Value   04/04/2019 5.3   06/20/2017 5.2   04/04/2016 5.7             ( goal A1C is < 7)   No results found for: LABMICR  LDL Cholesterol (mg/dL)   Date Value   04/04/2019 134 (H)   10/25/2016 62       (goal LDL is <100)   AST (U/L)   Date Value   04/04/2019 17     ALT (U/L)   Date Value   04/04/2019 16     BUN (mg/dL)   Date Value   04/04/2019 20     BP Readings from Last 3 Encounters:   05/07/19 136/66   05/07/19 (!) 142/70   04/02/19 110/70          (goal 120/80)    All Future Testing planned in [facility]  Lab Frequency Next Occurrence               Patient Active Problem List:     S/P CABG x 3 (2009)     CHF (congestive heart failure), NYHA class II (HCC)     S/P cardiac cath (4/24/14-Dr. 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 Marlyne Lesch     Bladder polyp     Pneumococcal vaccination declined     Influenza vaccination declined

## 2019-08-15 RX ORDER — CITALOPRAM 10 MG/1
TABLET ORAL
Qty: 30 TABLET | Refills: 0 | Status: SHIPPED | OUTPATIENT
Start: 2019-08-15 | End: 2019-08-20 | Stop reason: SDUPTHER

## 2019-08-20 ENCOUNTER — HOSPITAL ENCOUNTER (OUTPATIENT)
Age: 84
Setting detail: SPECIMEN
Discharge: HOME | End: 2019-08-20
Payer: MEDICARE

## 2019-08-20 ENCOUNTER — OFFICE VISIT (OUTPATIENT)
Dept: INTERNAL MEDICINE CLINIC | Age: 84
End: 2019-08-20
Payer: MEDICARE

## 2019-08-20 VITALS
OXYGEN SATURATION: 98 % | WEIGHT: 185.2 LBS | HEART RATE: 80 BPM | BODY MASS INDEX: 25.09 KG/M2 | HEIGHT: 72 IN | DIASTOLIC BLOOD PRESSURE: 80 MMHG | SYSTOLIC BLOOD PRESSURE: 118 MMHG | RESPIRATION RATE: 20 BRPM

## 2019-08-20 DIAGNOSIS — I25.10 CORONARY ARTERY DISEASE INVOLVING NATIVE CORONARY ARTERY OF NATIVE HEART WITHOUT ANGINA PECTORIS: ICD-10-CM

## 2019-08-20 DIAGNOSIS — Z79.52 ON PREDNISONE THERAPY: ICD-10-CM

## 2019-08-20 DIAGNOSIS — N28.9 RENAL INSUFFICIENCY: ICD-10-CM

## 2019-08-20 DIAGNOSIS — H91.93 BILATERAL HEARING LOSS, UNSPECIFIED HEARING LOSS TYPE: ICD-10-CM

## 2019-08-20 DIAGNOSIS — Z95.1 S/P CABG X 3: ICD-10-CM

## 2019-08-20 DIAGNOSIS — F41.9 ANXIETY: ICD-10-CM

## 2019-08-20 DIAGNOSIS — J45.20 MILD INTERMITTENT ASTHMA WITHOUT COMPLICATION: ICD-10-CM

## 2019-08-20 DIAGNOSIS — Z95.5 STENTED CORONARY ARTERY: ICD-10-CM

## 2019-08-20 DIAGNOSIS — I10 ESSENTIAL HYPERTENSION: ICD-10-CM

## 2019-08-20 DIAGNOSIS — I50.9 CONGESTIVE HEART FAILURE, NYHA CLASS 2, UNSPECIFIED CONGESTIVE HEART FAILURE TYPE (HCC): Primary | ICD-10-CM

## 2019-08-20 DIAGNOSIS — Z74.09 MOBILITY IMPAIRED: ICD-10-CM

## 2019-08-20 DIAGNOSIS — R73.01 IFG (IMPAIRED FASTING GLUCOSE): ICD-10-CM

## 2019-08-20 DIAGNOSIS — D41.4 BLADDER POLYP: ICD-10-CM

## 2019-08-20 DIAGNOSIS — Z95.810 S/P IMPLANTATION OF AUTOMATIC CARDIOVERTER/DEFIBRILLATOR (AICD): ICD-10-CM

## 2019-08-20 DIAGNOSIS — Z98.890 S/P CARDIAC CATH: ICD-10-CM

## 2019-08-20 DIAGNOSIS — E78.2 MIXED HYPERLIPIDEMIA: ICD-10-CM

## 2019-08-20 DIAGNOSIS — Z28.21 INFLUENZA VACCINATION DECLINED: ICD-10-CM

## 2019-08-20 DIAGNOSIS — M35.3 POLYMYALGIA RHEUMATICA (HCC): ICD-10-CM

## 2019-08-20 DIAGNOSIS — K21.9 GASTROESOPHAGEAL REFLUX DISEASE WITHOUT ESOPHAGITIS: ICD-10-CM

## 2019-08-20 DIAGNOSIS — Z28.21 PNEUMOCOCCAL VACCINATION DECLINED: ICD-10-CM

## 2019-08-20 LAB
ANION GAP SERPL CALCULATED.3IONS-SCNC: 10 MMOL/L (ref 9–17)
BUN BLDV-MCNC: 15 MG/DL (ref 8–23)
BUN/CREAT BLD: ABNORMAL (ref 9–20)
CALCIUM SERPL-MCNC: 9.5 MG/DL (ref 8.6–10.4)
CHLORIDE BLD-SCNC: 104 MMOL/L (ref 98–107)
CO2: 28 MMOL/L (ref 20–31)
CREAT SERPL-MCNC: 1.15 MG/DL (ref 0.7–1.2)
GFR AFRICAN AMERICAN: >60 ML/MIN
GFR NON-AFRICAN AMERICAN: >60 ML/MIN
GFR SERPL CREATININE-BSD FRML MDRD: ABNORMAL ML/MIN/{1.73_M2}
GFR SERPL CREATININE-BSD FRML MDRD: ABNORMAL ML/MIN/{1.73_M2}
GLUCOSE BLD-MCNC: 109 MG/DL (ref 70–99)
POTASSIUM SERPL-SCNC: 4.2 MMOL/L (ref 3.7–5.3)
SODIUM BLD-SCNC: 142 MMOL/L (ref 135–144)
TSH SERPL DL<=0.05 MIU/L-ACNC: 10.47 MIU/L (ref 0.3–5)

## 2019-08-20 PROCEDURE — 99214 OFFICE O/P EST MOD 30 MIN: CPT | Performed by: FAMILY MEDICINE

## 2019-08-20 PROCEDURE — 1123F ACP DISCUSS/DSCN MKR DOCD: CPT | Performed by: FAMILY MEDICINE

## 2019-08-20 PROCEDURE — G8427 DOCREV CUR MEDS BY ELIG CLIN: HCPCS | Performed by: FAMILY MEDICINE

## 2019-08-20 PROCEDURE — G8598 ASA/ANTIPLAT THER USED: HCPCS | Performed by: FAMILY MEDICINE

## 2019-08-20 PROCEDURE — 4040F PNEUMOC VAC/ADMIN/RCVD: CPT | Performed by: FAMILY MEDICINE

## 2019-08-20 PROCEDURE — 4004F PT TOBACCO SCREEN RCVD TLK: CPT | Performed by: FAMILY MEDICINE

## 2019-08-20 PROCEDURE — G8419 CALC BMI OUT NRM PARAM NOF/U: HCPCS | Performed by: FAMILY MEDICINE

## 2019-08-20 RX ORDER — BUSPIRONE HYDROCHLORIDE 5 MG/1
5 TABLET ORAL 2 TIMES DAILY
Qty: 60 TABLET | Refills: 2 | Status: SHIPPED | OUTPATIENT
Start: 2019-08-20 | End: 2019-09-23 | Stop reason: SINTOL

## 2019-08-20 RX ORDER — FLUOXETINE 10 MG/1
CAPSULE ORAL
Qty: 30 CAPSULE | Refills: 0 | OUTPATIENT
Start: 2019-08-20

## 2019-08-20 RX ORDER — FLUOXETINE 10 MG/1
CAPSULE ORAL
Qty: 20 CAPSULE | Refills: 0 | OUTPATIENT
Start: 2019-08-20

## 2019-08-20 RX ORDER — CITALOPRAM 10 MG/1
10 TABLET ORAL DAILY
Qty: 30 TABLET | Refills: 0 | Status: SHIPPED | COMMUNITY
Start: 2019-08-20 | End: 2020-01-01 | Stop reason: DRUGHIGH

## 2019-08-20 RX ORDER — CITALOPRAM 10 MG/1
20 TABLET ORAL DAILY
Qty: 30 TABLET | Refills: 0 | COMMUNITY
Start: 2019-08-20 | End: 2019-08-20 | Stop reason: DRUGHIGH

## 2019-08-20 ASSESSMENT — ENCOUNTER SYMPTOMS
BACK PAIN: 1
EYES NEGATIVE: 1
ALLERGIC/IMMUNOLOGIC NEGATIVE: 1
RESPIRATORY NEGATIVE: 1
GASTROINTESTINAL NEGATIVE: 1

## 2019-08-20 NOTE — PROGRESS NOTES
vaccination declined     16. Influenza vaccination declined     25. Bilateral hearing loss, unspecified hearing loss type  AFL - Manuel Clifton, AUD, Audiology, Delfino Neighbours   19. Polymyalgia rheumatica ([facility] [address]     20. On prednisone therapy           Objective:   Physical Exam   Constitutional: He is oriented to person, place, and time. He appears well-developed and well-nourished. HENT:   Head: Normocephalic and atraumatic. Right Ear: External ear normal.   Left Ear: External ear normal.   Nose: Nose normal.   Mouth/Throat: Oropharynx is clear and moist.   Bilateral hyperacusis   Eyes: Pupils are equal, round, and reactive to light. Conjunctivae and EOM are normal.   Neck: Normal range of motion. Neck supple. Cardiovascular: Normal rate, regular rhythm, normal heart sounds and intact distal pulses. Pulmonary/Chest: Effort normal and breath sounds normal.   Abdominal: Soft. Bowel sounds are normal.   Musculoskeletal: Normal range of motion. Degenerative spondylosis without any sign of myelopathy. Positive response to chiropractic manipulation   Neurological: He is alert and oriented to person, place, and time. He has normal reflexes. Skin: Skin is warm and dry. Psychiatric:   Anxiety/depression. He denies suicidality. Continue citalopram   Nursing note and vitals reviewed. Assessment:       Diagnosis Orders   1. Congestive heart failure, NYHA class 2, unspecified congestive heart failure type ([address]     2. Coronary artery disease involving native coronary artery of native heart without angina pectoris     3. Essential hypertension     4. Mild intermittent asthma without complication     5. Gastroesophageal reflux disease without esophagitis     6. IFG (impaired fasting glucose)     7. Renal insufficiency  TSH    Basic Metabolic Panel   8. 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 Adelaida Malcolm     9. S/P CABG x 3 (2009)     10.  S/P cardiac cath (4/24/14-Dr. Adelaida Malcolm) while intend to generate a document that accurately reflects the content of the visit, no guarantee can be provided that every mistake has been identified and corrected by editing.             Javed Palafox MD

## 2019-08-21 ENCOUNTER — TELEPHONE (OUTPATIENT)
Dept: INTERNAL MEDICINE CLINIC | Age: 84
End: 2019-08-21

## 2019-08-21 RX ORDER — CITALOPRAM 10 MG/1
TABLET ORAL
Qty: 30 TABLET | Refills: 0 | OUTPATIENT
Start: 2019-08-21

## 2019-08-21 RX ORDER — LEVOTHYROXINE SODIUM 0.05 MG/1
50 TABLET ORAL DAILY
Qty: 90 TABLET | Refills: 1 | Status: SHIPPED | OUTPATIENT
Start: 2019-08-21 | End: 2020-02-18

## 2019-08-21 NOTE — TELEPHONE ENCOUNTER
----- Message from Gege Ny MD sent at 8/21/2019  8:44 AM EDT -----  Hypothyroidism.   Please order levothyroxine 25 mcg p.o. daily #90 with 1 refill  Notify patient

## 2019-08-21 NOTE — TELEPHONE ENCOUNTER
----- Message from Bhumika Garsia MD sent at 8/21/2019  8:44 AM EDT -----  Hypothyroidism.   Please order levothyroxine 25 mcg p.o. daily #90 with 1 refill  Notify patient

## 2019-09-23 ENCOUNTER — TELEPHONE (OUTPATIENT)
Dept: INTERNAL MEDICINE CLINIC | Age: 84
End: 2019-09-23

## 2019-09-24 RX ORDER — CITALOPRAM 10 MG/1
TABLET ORAL
Qty: 90 TABLET | Refills: 1 | Status: SHIPPED | OUTPATIENT
Start: 2019-09-24 | End: 2020-03-18

## 2019-10-17 RX ORDER — FLUOXETINE 10 MG/1
CAPSULE ORAL
Qty: 30 CAPSULE | Refills: 0 | OUTPATIENT
Start: 2019-10-17

## 2019-11-19 ENCOUNTER — OFFICE VISIT (OUTPATIENT)
Dept: INTERNAL MEDICINE CLINIC | Age: 84
End: 2019-11-19
Payer: MEDICARE

## 2019-11-19 ENCOUNTER — HOSPITAL ENCOUNTER (OUTPATIENT)
Age: 84
Setting detail: SPECIMEN
Discharge: HOME | End: 2019-11-19
Payer: MEDICARE

## 2019-11-19 VITALS
SYSTOLIC BLOOD PRESSURE: 120 MMHG | OXYGEN SATURATION: 98 % | WEIGHT: 187.2 LBS | DIASTOLIC BLOOD PRESSURE: 76 MMHG | HEART RATE: 80 BPM | HEIGHT: 74 IN | RESPIRATION RATE: 20 BRPM | BODY MASS INDEX: 24.02 KG/M2

## 2019-11-19 DIAGNOSIS — Z98.890 S/P CARDIAC CATH: ICD-10-CM

## 2019-11-19 DIAGNOSIS — Z28.21 INFLUENZA VACCINATION DECLINED: ICD-10-CM

## 2019-11-19 DIAGNOSIS — I50.22 CHRONIC SYSTOLIC CONGESTIVE HEART FAILURE, NYHA CLASS 2 (HCC): ICD-10-CM

## 2019-11-19 DIAGNOSIS — E03.9 HYPOTHYROIDISM, UNSPECIFIED TYPE: ICD-10-CM

## 2019-11-19 DIAGNOSIS — N28.9 RENAL INSUFFICIENCY: ICD-10-CM

## 2019-11-19 DIAGNOSIS — D41.4 BLADDER POLYP: ICD-10-CM

## 2019-11-19 DIAGNOSIS — Z74.09 MOBILITY IMPAIRED: ICD-10-CM

## 2019-11-19 DIAGNOSIS — N20.1 URETERAL CALCULUS, RIGHT: ICD-10-CM

## 2019-11-19 DIAGNOSIS — Z28.21 PNEUMOCOCCAL VACCINATION DECLINED: ICD-10-CM

## 2019-11-19 DIAGNOSIS — Z95.1 S/P CABG X 3: ICD-10-CM

## 2019-11-19 DIAGNOSIS — R73.01 IFG (IMPAIRED FASTING GLUCOSE): ICD-10-CM

## 2019-11-19 DIAGNOSIS — M35.3 POLYMYALGIA RHEUMATICA (HCC): ICD-10-CM

## 2019-11-19 DIAGNOSIS — I10 ESSENTIAL HYPERTENSION: ICD-10-CM

## 2019-11-19 DIAGNOSIS — I25.10 CORONARY ARTERY DISEASE INVOLVING NATIVE CORONARY ARTERY OF NATIVE HEART WITHOUT ANGINA PECTORIS: ICD-10-CM

## 2019-11-19 DIAGNOSIS — F41.9 ANXIETY: ICD-10-CM

## 2019-11-19 DIAGNOSIS — E78.2 MIXED HYPERLIPIDEMIA: ICD-10-CM

## 2019-11-19 DIAGNOSIS — Z79.52 ON PREDNISONE THERAPY: ICD-10-CM

## 2019-11-19 DIAGNOSIS — R55 NEUROCARDIOGENIC SYNCOPE: ICD-10-CM

## 2019-11-19 DIAGNOSIS — J45.20 MILD INTERMITTENT ASTHMA WITHOUT COMPLICATION: ICD-10-CM

## 2019-11-19 DIAGNOSIS — Z95.810 S/P IMPLANTATION OF AUTOMATIC CARDIOVERTER/DEFIBRILLATOR (AICD): ICD-10-CM

## 2019-11-19 DIAGNOSIS — K21.9 GASTROESOPHAGEAL REFLUX DISEASE WITHOUT ESOPHAGITIS: ICD-10-CM

## 2019-11-19 DIAGNOSIS — Z95.5 STENTED CORONARY ARTERY: Primary | ICD-10-CM

## 2019-11-19 LAB — TSH SERPL DL<=0.05 MIU/L-ACNC: 17.32 MIU/L (ref 0.3–5)

## 2019-11-19 PROCEDURE — 4004F PT TOBACCO SCREEN RCVD TLK: CPT | Performed by: FAMILY MEDICINE

## 2019-11-19 PROCEDURE — G8427 DOCREV CUR MEDS BY ELIG CLIN: HCPCS | Performed by: FAMILY MEDICINE

## 2019-11-19 PROCEDURE — 99214 OFFICE O/P EST MOD 30 MIN: CPT | Performed by: FAMILY MEDICINE

## 2019-11-19 PROCEDURE — 4040F PNEUMOC VAC/ADMIN/RCVD: CPT | Performed by: FAMILY MEDICINE

## 2019-11-19 PROCEDURE — G8484 FLU IMMUNIZE NO ADMIN: HCPCS | Performed by: FAMILY MEDICINE

## 2019-11-19 PROCEDURE — G8420 CALC BMI NORM PARAMETERS: HCPCS | Performed by: FAMILY MEDICINE

## 2019-11-19 PROCEDURE — G8598 ASA/ANTIPLAT THER USED: HCPCS | Performed by: FAMILY MEDICINE

## 2019-11-19 PROCEDURE — 1123F ACP DISCUSS/DSCN MKR DOCD: CPT | Performed by: FAMILY MEDICINE

## 2019-11-19 RX ORDER — METOPROLOL SUCCINATE 25 MG/1
TABLET, EXTENDED RELEASE ORAL
Refills: 6 | Status: ON HOLD | COMMUNITY
Start: 2019-11-14 | End: 2020-01-01 | Stop reason: SDUPTHER

## 2019-11-19 RX ORDER — LOSARTAN POTASSIUM 25 MG/1
TABLET ORAL
Refills: 11 | COMMUNITY
Start: 2019-11-14

## 2019-11-19 ASSESSMENT — ENCOUNTER SYMPTOMS
ALLERGIC/IMMUNOLOGIC NEGATIVE: 1
BACK PAIN: 1
EYES NEGATIVE: 1
GASTROINTESTINAL NEGATIVE: 1
RESPIRATORY NEGATIVE: 1

## 2019-12-03 ENCOUNTER — HOSPITAL ENCOUNTER (OUTPATIENT)
Age: 84
Setting detail: SPECIMEN
Discharge: HOME | End: 2019-12-03
Payer: MEDICARE

## 2019-12-03 LAB
BUN BLDV-MCNC: 22 MG/DL (ref 8–23)
CREAT SERPL-MCNC: 1.35 MG/DL (ref 0.7–1.2)
GFR AFRICAN AMERICAN: >60 ML/MIN
GFR NON-AFRICAN AMERICAN: 51 ML/MIN
GFR SERPL CREATININE-BSD FRML MDRD: ABNORMAL ML/MIN/{1.73_M2}
GFR SERPL CREATININE-BSD FRML MDRD: ABNORMAL ML/MIN/{1.73_M2}
PROSTATE SPECIFIC ANTIGEN: 0.31 UG/L

## 2019-12-03 PROCEDURE — 82565 ASSAY OF CREATININE: CPT

## 2019-12-03 PROCEDURE — 36415 COLL VENOUS BLD VENIPUNCTURE: CPT

## 2019-12-03 PROCEDURE — 84520 ASSAY OF UREA NITROGEN: CPT

## 2019-12-03 PROCEDURE — 84153 ASSAY OF PSA TOTAL: CPT

## 2019-12-17 RX ORDER — FLUOXETINE 10 MG/1
CAPSULE ORAL
Qty: 30 CAPSULE | Refills: 0 | OUTPATIENT
Start: 2019-12-17

## 2020-01-01 ENCOUNTER — HOSPITAL ENCOUNTER (OUTPATIENT)
Age: 85
Setting detail: OUTPATIENT SURGERY
Discharge: HOME | End: 2020-08-21
Attending: UROLOGY | Admitting: UROLOGY
Payer: MEDICARE

## 2020-01-01 ENCOUNTER — HOSPITAL ENCOUNTER (OUTPATIENT)
Dept: PREADMISSION TESTING | Age: 85
Setting detail: SPECIMEN
Discharge: HOME | End: 2020-08-21
Payer: MEDICARE

## 2020-01-01 ENCOUNTER — ANESTHESIA EVENT (OUTPATIENT)
Dept: OPERATING ROOM | Age: 85
End: 2020-01-01
Payer: MEDICARE

## 2020-01-01 ENCOUNTER — OFFICE VISIT (OUTPATIENT)
Dept: INTERNAL MEDICINE CLINIC | Age: 85
End: 2020-01-01
Payer: MEDICARE

## 2020-01-01 ENCOUNTER — ANESTHESIA (OUTPATIENT)
Dept: OPERATING ROOM | Age: 85
End: 2020-01-01
Payer: MEDICARE

## 2020-01-01 VITALS — SYSTOLIC BLOOD PRESSURE: 118 MMHG | DIASTOLIC BLOOD PRESSURE: 62 MMHG | OXYGEN SATURATION: 97 %

## 2020-01-01 VITALS
HEART RATE: 69 BPM | HEIGHT: 72 IN | TEMPERATURE: 97.5 F | DIASTOLIC BLOOD PRESSURE: 87 MMHG | SYSTOLIC BLOOD PRESSURE: 146 MMHG | RESPIRATION RATE: 12 BRPM | BODY MASS INDEX: 24.11 KG/M2 | OXYGEN SATURATION: 99 % | WEIGHT: 178 LBS

## 2020-01-01 VITALS
HEART RATE: 80 BPM | BODY MASS INDEX: 23.84 KG/M2 | SYSTOLIC BLOOD PRESSURE: 120 MMHG | DIASTOLIC BLOOD PRESSURE: 72 MMHG | HEIGHT: 72 IN | TEMPERATURE: 97 F | WEIGHT: 176 LBS | RESPIRATION RATE: 18 BRPM

## 2020-01-01 LAB
CULTURE: NO GROWTH
Lab: NORMAL
SARS-COV-2, NAA: NOT DETECTED
SPECIMEN DESCRIPTION: NORMAL
UROTHELIAL CANCER DETECTION: NORMAL

## 2020-01-01 PROCEDURE — 2500000003 HC RX 250 WO HCPCS: Performed by: ANESTHESIOLOGY

## 2020-01-01 PROCEDURE — G0009 ADMIN PNEUMOCOCCAL VACCINE: HCPCS | Performed by: FAMILY MEDICINE

## 2020-01-01 PROCEDURE — 87086 URINE CULTURE/COLONY COUNT: CPT

## 2020-01-01 PROCEDURE — 3600000012 HC SURGERY LEVEL 2 ADDTL 15MIN: Performed by: UROLOGY

## 2020-01-01 PROCEDURE — G0438 PPPS, INITIAL VISIT: HCPCS | Performed by: FAMILY MEDICINE

## 2020-01-01 PROCEDURE — G0008 ADMIN INFLUENZA VIRUS VAC: HCPCS | Performed by: FAMILY MEDICINE

## 2020-01-01 PROCEDURE — 3600000002 HC SURGERY LEVEL 2 BASE: Performed by: UROLOGY

## 2020-01-01 PROCEDURE — 88120 CYTP URNE 3-5 PROBES EA SPEC: CPT

## 2020-01-01 PROCEDURE — 3700000001 HC ADD 15 MINUTES (ANESTHESIA): Performed by: UROLOGY

## 2020-01-01 PROCEDURE — 3700000000 HC ANESTHESIA ATTENDED CARE: Performed by: UROLOGY

## 2020-01-01 PROCEDURE — 7100000011 HC PHASE II RECOVERY - ADDTL 15 MIN: Performed by: UROLOGY

## 2020-01-01 PROCEDURE — 1123F ACP DISCUSS/DSCN MKR DOCD: CPT | Performed by: FAMILY MEDICINE

## 2020-01-01 PROCEDURE — 90694 VACC AIIV4 NO PRSRV 0.5ML IM: CPT | Performed by: FAMILY MEDICINE

## 2020-01-01 PROCEDURE — 4040F PNEUMOC VAC/ADMIN/RCVD: CPT | Performed by: FAMILY MEDICINE

## 2020-01-01 PROCEDURE — 6370000000 HC RX 637 (ALT 250 FOR IP): Performed by: UROLOGY

## 2020-01-01 PROCEDURE — 90732 PPSV23 VACC 2 YRS+ SUBQ/IM: CPT | Performed by: FAMILY MEDICINE

## 2020-01-01 PROCEDURE — 7100000010 HC PHASE II RECOVERY - FIRST 15 MIN: Performed by: UROLOGY

## 2020-01-01 PROCEDURE — 6360000002 HC RX W HCPCS: Performed by: ANESTHESIOLOGY

## 2020-01-01 PROCEDURE — 6360000002 HC RX W HCPCS: Performed by: UROLOGY

## 2020-01-01 PROCEDURE — 2709999900 HC NON-CHARGEABLE SUPPLY: Performed by: UROLOGY

## 2020-01-01 PROCEDURE — U0003 INFECTIOUS AGENT DETECTION BY NUCLEIC ACID (DNA OR RNA); SEVERE ACUTE RESPIRATORY SYNDROME CORONAVIRUS 2 (SARS-COV-2) (CORONAVIRUS DISEASE [COVID-19]), AMPLIFIED PROBE TECHNIQUE, MAKING USE OF HIGH THROUGHPUT TECHNOLOGIES AS DESCRIBED BY CMS-2020-01-R: HCPCS

## 2020-01-01 PROCEDURE — G8484 FLU IMMUNIZE NO ADMIN: HCPCS | Performed by: FAMILY MEDICINE

## 2020-01-01 PROCEDURE — 2580000003 HC RX 258: Performed by: ANESTHESIOLOGY

## 2020-01-01 RX ORDER — HYDRALAZINE HYDROCHLORIDE 20 MG/ML
5 INJECTION INTRAMUSCULAR; INTRAVENOUS EVERY 10 MIN PRN
Status: DISCONTINUED | OUTPATIENT
Start: 2020-01-01 | End: 2020-01-01 | Stop reason: HOSPADM

## 2020-01-01 RX ORDER — LEVOTHYROXINE SODIUM 0.05 MG/1
TABLET ORAL
Qty: 90 TABLET | Refills: 1 | Status: SHIPPED | OUTPATIENT
Start: 2020-01-01 | End: 2021-01-01

## 2020-01-01 RX ORDER — OXYCODONE HYDROCHLORIDE AND ACETAMINOPHEN 5; 325 MG/1; MG/1
1 TABLET ORAL PRN
Status: DISCONTINUED | OUTPATIENT
Start: 2020-01-01 | End: 2020-01-01 | Stop reason: HOSPADM

## 2020-01-01 RX ORDER — OXYCODONE HYDROCHLORIDE AND ACETAMINOPHEN 5; 325 MG/1; MG/1
2 TABLET ORAL PRN
Status: DISCONTINUED | OUTPATIENT
Start: 2020-01-01 | End: 2020-01-01 | Stop reason: HOSPADM

## 2020-01-01 RX ORDER — ONDANSETRON 2 MG/ML
4 INJECTION INTRAMUSCULAR; INTRAVENOUS
Status: DISCONTINUED | OUTPATIENT
Start: 2020-01-01 | End: 2020-01-01 | Stop reason: HOSPADM

## 2020-01-01 RX ORDER — LEVOTHYROXINE SODIUM 0.05 MG/1
TABLET ORAL
Qty: 45 TABLET | Refills: 0 | OUTPATIENT
Start: 2020-01-01

## 2020-01-01 RX ORDER — CITALOPRAM 20 MG/1
20 TABLET ORAL DAILY
Qty: 30 TABLET | Refills: 0 | Status: SHIPPED | OUTPATIENT
Start: 2020-01-01 | End: 2021-01-01

## 2020-01-01 RX ORDER — LIDOCAINE HYDROCHLORIDE 20 MG/ML
INJECTION, SOLUTION EPIDURAL; INFILTRATION; INTRACAUDAL; PERINEURAL PRN
Status: DISCONTINUED | OUTPATIENT
Start: 2020-01-01 | End: 2020-01-01 | Stop reason: SDUPTHER

## 2020-01-01 RX ORDER — LEVOTHYROXINE SODIUM 0.05 MG/1
TABLET ORAL
Qty: 30 TABLET | Refills: 0 | Status: SHIPPED | OUTPATIENT
Start: 2020-01-01 | End: 2020-01-01

## 2020-01-01 RX ORDER — PROPOFOL 10 MG/ML
INJECTION, EMULSION INTRAVENOUS CONTINUOUS PRN
Status: DISCONTINUED | OUTPATIENT
Start: 2020-01-01 | End: 2020-01-01 | Stop reason: SDUPTHER

## 2020-01-01 RX ORDER — CITALOPRAM 10 MG/1
20 TABLET ORAL DAILY
Qty: 30 TABLET | Refills: 0 | Status: ON HOLD | COMMUNITY
Start: 2020-01-01 | End: 2021-01-01 | Stop reason: ALTCHOICE

## 2020-01-01 RX ORDER — CEFAZOLIN SODIUM 2 G/50ML
2 SOLUTION INTRAVENOUS ONCE
Status: COMPLETED | OUTPATIENT
Start: 2020-01-01 | End: 2020-01-01

## 2020-01-01 RX ORDER — CEPHALEXIN 500 MG/1
500 CAPSULE ORAL 2 TIMES DAILY
Qty: 8 CAPSULE | Refills: 0 | Status: SHIPPED | OUTPATIENT
Start: 2020-01-01 | End: 2020-01-01

## 2020-01-01 RX ORDER — FENTANYL CITRATE 50 UG/ML
25 INJECTION, SOLUTION INTRAMUSCULAR; INTRAVENOUS EVERY 5 MIN PRN
Status: DISCONTINUED | OUTPATIENT
Start: 2020-01-01 | End: 2020-01-01 | Stop reason: HOSPADM

## 2020-01-01 RX ORDER — LIDOCAINE HYDROCHLORIDE 10 MG/ML
1 INJECTION, SOLUTION EPIDURAL; INFILTRATION; INTRACAUDAL; PERINEURAL
Status: DISCONTINUED | OUTPATIENT
Start: 2020-01-01 | End: 2020-01-01 | Stop reason: HOSPADM

## 2020-01-01 RX ORDER — PROPOFOL 10 MG/ML
INJECTION, EMULSION INTRAVENOUS PRN
Status: DISCONTINUED | OUTPATIENT
Start: 2020-01-01 | End: 2020-01-01 | Stop reason: SDUPTHER

## 2020-01-01 RX ORDER — LEVOTHYROXINE SODIUM 0.05 MG/1
TABLET ORAL
Qty: 30 TABLET | Refills: 0 | OUTPATIENT
Start: 2020-01-01

## 2020-01-01 RX ORDER — LIDOCAINE HYDROCHLORIDE 20 MG/ML
JELLY TOPICAL PRN
Status: DISCONTINUED | OUTPATIENT
Start: 2020-01-01 | End: 2020-01-01 | Stop reason: ALTCHOICE

## 2020-01-01 RX ORDER — HYDROMORPHONE HCL 110MG/55ML
0.5 PATIENT CONTROLLED ANALGESIA SYRINGE INTRAVENOUS EVERY 5 MIN PRN
Status: DISCONTINUED | OUTPATIENT
Start: 2020-01-01 | End: 2020-01-01 | Stop reason: HOSPADM

## 2020-01-01 RX ORDER — PROMETHAZINE HYDROCHLORIDE 25 MG/ML
6.25 INJECTION, SOLUTION INTRAMUSCULAR; INTRAVENOUS
Status: DISCONTINUED | OUTPATIENT
Start: 2020-01-01 | End: 2020-01-01 | Stop reason: HOSPADM

## 2020-01-01 RX ORDER — LABETALOL HYDROCHLORIDE 5 MG/ML
5 INJECTION, SOLUTION INTRAVENOUS EVERY 10 MIN PRN
Status: DISCONTINUED | OUTPATIENT
Start: 2020-01-01 | End: 2020-01-01 | Stop reason: HOSPADM

## 2020-01-01 RX ORDER — SODIUM CHLORIDE, SODIUM LACTATE, POTASSIUM CHLORIDE, CALCIUM CHLORIDE 600; 310; 30; 20 MG/100ML; MG/100ML; MG/100ML; MG/100ML
INJECTION, SOLUTION INTRAVENOUS CONTINUOUS
Status: DISCONTINUED | OUTPATIENT
Start: 2020-01-01 | End: 2020-01-01 | Stop reason: HOSPADM

## 2020-01-01 RX ADMIN — PROPOFOL 50 MG: 10 INJECTION, EMULSION INTRAVENOUS at 11:40

## 2020-01-01 RX ADMIN — LIDOCAINE HYDROCHLORIDE 50 MG: 20 INJECTION, SOLUTION EPIDURAL; INFILTRATION; INTRACAUDAL; PERINEURAL at 11:36

## 2020-01-01 RX ADMIN — SODIUM CHLORIDE, POTASSIUM CHLORIDE, SODIUM LACTATE AND CALCIUM CHLORIDE: 600; 310; 30; 20 INJECTION, SOLUTION INTRAVENOUS at 11:32

## 2020-01-01 RX ADMIN — CEFAZOLIN SODIUM 2 G: 2 SOLUTION INTRAVENOUS at 11:44

## 2020-01-01 RX ADMIN — PROPOFOL 75 MCG/KG/MIN: 10 INJECTION, EMULSION INTRAVENOUS at 11:40

## 2020-01-01 ASSESSMENT — PULMONARY FUNCTION TESTS
PIF_VALUE: 1
PIF_VALUE: 0
PIF_VALUE: 1
PIF_VALUE: 0
PIF_VALUE: 1
PIF_VALUE: 1
PIF_VALUE: 0
PIF_VALUE: 1
PIF_VALUE: 0
PIF_VALUE: 1
PIF_VALUE: 0
PIF_VALUE: 1

## 2020-01-01 ASSESSMENT — ENCOUNTER SYMPTOMS
BACK PAIN: 1
SHORTNESS OF BREATH: 1
GASTROINTESTINAL NEGATIVE: 1
EYES NEGATIVE: 1
ALLERGIC/IMMUNOLOGIC NEGATIVE: 1
RESPIRATORY NEGATIVE: 1

## 2020-01-01 ASSESSMENT — PATIENT HEALTH QUESTIONNAIRE - PHQ9
8. MOVING OR SPEAKING SO SLOWLY THAT OTHER PEOPLE COULD HAVE NOTICED. OR THE OPPOSITE, BEING SO FIGETY OR RESTLESS THAT YOU HAVE BEEN MOVING AROUND A LOT MORE THAN USUAL: 0
5. POOR APPETITE OR OVEREATING: 0
4. FEELING TIRED OR HAVING LITTLE ENERGY: 2
SUM OF ALL RESPONSES TO PHQ9 QUESTIONS 1 & 2: 5
9. THOUGHTS THAT YOU WOULD BE BETTER OFF DEAD, OR OF HURTING YOURSELF: 0
10. IF YOU CHECKED OFF ANY PROBLEMS, HOW DIFFICULT HAVE THESE PROBLEMS MADE IT FOR YOU TO DO YOUR WORK, TAKE CARE OF THINGS AT HOME, OR GET ALONG WITH OTHER PEOPLE: 1
SUM OF ALL RESPONSES TO PHQ QUESTIONS 1-9: 9
6. FEELING BAD ABOUT YOURSELF - OR THAT YOU ARE A FAILURE OR HAVE LET YOURSELF OR YOUR FAMILY DOWN: 0
1. LITTLE INTEREST OR PLEASURE IN DOING THINGS: 2
2. FEELING DOWN, DEPRESSED OR HOPELESS: 3
SUM OF ALL RESPONSES TO PHQ QUESTIONS 1-9: 9
3. TROUBLE FALLING OR STAYING ASLEEP: 2
7. TROUBLE CONCENTRATING ON THINGS, SUCH AS READING THE NEWSPAPER OR WATCHING TELEVISION: 0
SUM OF ALL RESPONSES TO PHQ QUESTIONS 1-9: 9

## 2020-01-01 ASSESSMENT — PAIN SCALES - GENERAL
PAINLEVEL_OUTOF10: 0
PAINLEVEL_OUTOF10: 0

## 2020-01-01 ASSESSMENT — LIFESTYLE VARIABLES: HOW OFTEN DO YOU HAVE A DRINK CONTAINING ALCOHOL: 0

## 2020-02-18 RX ORDER — LEVOTHYROXINE SODIUM 0.05 MG/1
TABLET ORAL
Qty: 30 TABLET | Refills: 0 | Status: SHIPPED | OUTPATIENT
Start: 2020-02-18 | End: 2020-03-18

## 2020-03-17 NOTE — TELEPHONE ENCOUNTER
Pneumococcal vaccination declined     Influenza vaccination declined     Polymyalgia rheumatica ([facility] [address]     On prednisone therapy     Neurocardiogenic syncope     Hypothyroid

## 2020-03-18 RX ORDER — CITALOPRAM 10 MG/1
TABLET ORAL
Qty: 90 TABLET | Refills: 0 | Status: SHIPPED | OUTPATIENT
Start: 2020-03-18 | End: 2020-04-14 | Stop reason: SDUPTHER

## 2020-03-18 RX ORDER — LEVOTHYROXINE SODIUM 0.05 MG/1
TABLET ORAL
Qty: 30 TABLET | Refills: 0 | Status: SHIPPED | OUTPATIENT
Start: 2020-03-18 | End: 2020-04-14 | Stop reason: SDUPTHER

## 2020-04-30 ENCOUNTER — HOSPITAL ENCOUNTER (OUTPATIENT)
Age: 85
Discharge: HOME | End: 2020-04-30
Payer: MEDICARE

## 2020-04-30 LAB
ABSOLUTE EOS #: 0.3 K/UL (ref 0–0.4)
ABSOLUTE IMMATURE GRANULOCYTE: ABNORMAL K/UL (ref 0–0.3)
ABSOLUTE LYMPH #: 2.3 K/UL (ref 1–4.8)
ABSOLUTE MONO #: 0.8 K/UL (ref 0.1–1.3)
ALBUMIN SERPL-MCNC: 4 G/DL (ref 3.5–5.2)
ALBUMIN/GLOBULIN RATIO: ABNORMAL (ref 1–2.5)
ALP BLD-CCNC: 75 U/L (ref 40–129)
ALT SERPL-CCNC: 13 U/L (ref 5–41)
ANION GAP SERPL CALCULATED.3IONS-SCNC: 12 MMOL/L (ref 9–17)
AST SERPL-CCNC: 17 U/L
BASOPHILS # BLD: 1 % (ref 0–2)
BASOPHILS ABSOLUTE: 0.1 K/UL (ref 0–0.2)
BILIRUB SERPL-MCNC: 0.6 MG/DL (ref 0.3–1.2)
BUN BLDV-MCNC: 18 MG/DL (ref 8–23)
BUN/CREAT BLD: ABNORMAL (ref 9–20)
C-REACTIVE PROTEIN: 1.1 MG/L (ref 0–5)
CALCIUM SERPL-MCNC: 9.6 MG/DL (ref 8.6–10.4)
CHLORIDE BLD-SCNC: 103 MMOL/L (ref 98–107)
CO2: 26 MMOL/L (ref 20–31)
CREAT SERPL-MCNC: 1.33 MG/DL (ref 0.7–1.2)
DIFFERENTIAL TYPE: ABNORMAL
EOSINOPHILS RELATIVE PERCENT: 3 % (ref 0–4)
GFR AFRICAN AMERICAN: >60 ML/MIN
GFR NON-AFRICAN AMERICAN: 51 ML/MIN
GFR SERPL CREATININE-BSD FRML MDRD: ABNORMAL ML/MIN/{1.73_M2}
GFR SERPL CREATININE-BSD FRML MDRD: ABNORMAL ML/MIN/{1.73_M2}
GLUCOSE BLD-MCNC: 115 MG/DL (ref 70–99)
HCT VFR BLD CALC: 46 % (ref 41–53)
HEMOGLOBIN: 15.3 G/DL (ref 13.5–17.5)
IMMATURE GRANULOCYTES: ABNORMAL %
LYMPHOCYTES # BLD: 27 % (ref 24–44)
MCH RBC QN AUTO: 31.4 PG (ref 26–34)
MCHC RBC AUTO-ENTMCNC: 33.3 G/DL (ref 31–37)
MCV RBC AUTO: 94.1 FL (ref 80–100)
MONOCYTES # BLD: 9 % (ref 1–7)
NRBC AUTOMATED: ABNORMAL PER 100 WBC
PDW BLD-RTO: 14.1 % (ref 11.5–14.9)
PLATELET # BLD: 187 K/UL (ref 150–450)
PLATELET ESTIMATE: ABNORMAL
PMV BLD AUTO: 8.2 FL (ref 6–12)
POTASSIUM SERPL-SCNC: 4.3 MMOL/L (ref 3.7–5.3)
RBC # BLD: 4.89 M/UL (ref 4.5–5.9)
RBC # BLD: ABNORMAL 10*6/UL
SEDIMENTATION RATE, ERYTHROCYTE: 4 MM (ref 0–15)
SEG NEUTROPHILS: 60 % (ref 36–66)
SEGMENTED NEUTROPHILS ABSOLUTE COUNT: 5.1 K/UL (ref 1.3–9.1)
SODIUM BLD-SCNC: 141 MMOL/L (ref 135–144)
TOTAL PROTEIN: 7.2 G/DL (ref 6.4–8.3)
URIC ACID: 5 MG/DL (ref 3.4–7)
WBC # BLD: 8.5 K/UL (ref 3.5–11)
WBC # BLD: ABNORMAL 10*3/UL

## 2020-04-30 PROCEDURE — 85651 RBC SED RATE NONAUTOMATED: CPT

## 2020-04-30 PROCEDURE — 84550 ASSAY OF BLOOD/URIC ACID: CPT

## 2020-04-30 PROCEDURE — 85025 COMPLETE CBC W/AUTO DIFF WBC: CPT

## 2020-04-30 PROCEDURE — 86140 C-REACTIVE PROTEIN: CPT

## 2020-04-30 PROCEDURE — 80053 COMPREHEN METABOLIC PANEL: CPT

## 2020-04-30 PROCEDURE — 36415 COLL VENOUS BLD VENIPUNCTURE: CPT

## 2020-06-30 ENCOUNTER — TELEPHONE (OUTPATIENT)
Dept: INTERNAL MEDICINE CLINIC | Age: 85
End: 2020-06-30

## 2020-06-30 NOTE — TELEPHONE ENCOUNTER
He was last seen in November 2019. Advise office evaluation. In the meantime he may stop citalopram and observe.   We will address in the next visit

## 2020-07-01 ENCOUNTER — TELEPHONE (OUTPATIENT)
Dept: FAMILY MEDICINE CLINIC | Age: 85
End: 2020-07-01

## 2020-07-13 RX ORDER — LEVOTHYROXINE SODIUM 0.05 MG/1
TABLET ORAL
Qty: 30 TABLET | Refills: 0 | Status: SHIPPED | OUTPATIENT
Start: 2020-07-13 | End: 2020-07-28

## 2020-07-13 RX ORDER — CITALOPRAM 10 MG/1
TABLET ORAL
Qty: 30 TABLET | Refills: 0 | Status: ON HOLD | OUTPATIENT
Start: 2020-07-13 | End: 2020-01-01

## 2020-07-13 NOTE — TELEPHONE ENCOUNTER
appt made
Pneumococcal vaccination declined     Influenza vaccination declined     Polymyalgia rheumatica ([facility] [address]     On prednisone therapy     Neurocardiogenic syncope     Hypothyroid

## 2020-07-28 RX ORDER — LEVOTHYROXINE SODIUM 0.05 MG/1
TABLET ORAL
Qty: 45 TABLET | Refills: 0 | Status: SHIPPED | OUTPATIENT
Start: 2020-07-28 | End: 2020-01-01

## 2020-07-28 NOTE — TELEPHONE ENCOUNTER
Last visit: 11/19/19  Last Med refill: 7/13/2020  Does patient have enough medication for 72 hours: Yes    Next Visit Date:  Future Appointments   Date Time Provider Mendez Traci   10/13/2020  9:00 AM Aliyah Merchant  [facility] Maintenance   Topic Date Due    DTaP/Tdap/Td vaccine (1 - Tdap) 10/10/1956    Shingles Vaccine (1 of 2) 10/10/1987    Pneumococcal 65+ years Vaccine (1 of 1 - PPSV23) 10/10/2002    Annual Wellness Visit (AWV)  08/20/2020 (Originally 5/29/2019)    Flu vaccine (1) 09/01/2020    TSH testing  11/19/2020    Potassium monitoring  04/30/2021    Creatinine monitoring  04/30/2021    Hepatitis A vaccine  Aged Out    Hepatitis B vaccine  Aged Out    Hib vaccine  Aged Out    Meningococcal (ACWY) vaccine  Aged Out       Hemoglobin A1C (%)   Date Value   04/04/2019 5.3   06/20/2017 5.2   04/04/2016 5.7             ( goal A1C is < 7)   No results found for: LABMICR  LDL Cholesterol (mg/dL)   Date Value   04/04/2019 134 (H)   10/25/2016 62       (goal LDL is <100)   AST (U/L)   Date Value   04/30/2020 17     ALT (U/L)   Date Value   04/30/2020 13     BUN (mg/dL)   Date Value   04/30/2020 18     BP Readings from Last 3 Encounters:   11/19/19 120/76   08/20/19 118/80   05/07/19 136/66          (goal 120/80)    All Future Testing planned in [facility]  Lab Frequency Next Occurrence               Patient Active Problem List:     S/P CABG x 3 (2009)     CHF (congestive heart failure), NYHA class II ([facility])     S/P cardiac cath (4/24/14-Dr. chun)     Anxiety     CAD (coronary artery disease)     Hyperlipemia     Stented coronary artery     GERD (gastroesophageal reflux disease)     Ureteral calculus, right     IFG (impaired fasting glucose)     Renal insufficiency     Essential hypertension     Mobility impaired     Mild intermittent asthma without complication     S/P implantation of automatic cardioverter/defibrillator (AICD)-CRT 1/30/17 - Dr. Esequiel Cooks     Bladder polyp Pneumococcal vaccination declined     Influenza vaccination declined     Polymyalgia rheumatica ([facility] [address]     On prednisone therapy     Neurocardiogenic syncope     Hypothyroid

## 2020-08-21 NOTE — H&P
History and Physical Service   [place] [id number]    HISTORY AND PHYSICAL EXAMINATION            Date of Evaluation: 8/21/2020  Patient name:  My Dean  MRN:   [id number]  YOB: 1935  PCP:    Mae Fofana MD    History Obtained From:     Patient, medical records    History of Present Illness: This is My Dean a 82 y.o. male who presents today for a Cystoscopy bladder biopsy with cauterization by Dr. Johanna Patten for bladder cancer. Last ate 5:30pm yesterday, Last had something to drink yesterday night states around 9pm. Denies recent illness fever or chills. Last took aspirin 1 week ago. Denies any chest pain or shortness of breath. Past Medical History:     Past Medical History:   Diagnosis Date    Abnormal tilt table test 08/2016    POSITIVE AFTER SYNCOPAL EPISODE    Arthritis     Bradycardia     CAD (coronary artery disease)     CHF (congestive heart failure) (HCC)     COPD (chronic obstructive pulmonary disease) (HCC)     DVT (deep vein thrombosis) in pregnancy 10/2014    EVIDENCE OF LT FEMORAL AND POPLITEAL DVT / STARTED ON ANTICOAGULATION     Frequent PVCs     GERD (gastroesophageal reflux disease)     Heart attack ([facility] [address] 2000    Hx of blood clots     clot in left leg 3 years ago    Hyperlipidemia     Hypertension     Kidney stones 2009? patient states passed    KENDELL (obstructive sleep apnea)     MILD KENDELL B SLEEP SUDY    SOB (shortness of breath)     Syncopal episodes         Past Surgical History:     Past Surgical History:   Procedure Laterality Date    CARDIAC DEFIBRILLATOR PLACEMENT  01/30/2017    BI-V ICD DR Margot Alexander    CARDIAC DEFIBRILLATOR PLACEMENT  01/30/2017    CRT    CARDIAC DEFIBRILLATOR PLACEMENT  01/30/2017    CRT    CARDIAC SURGERY  7/12/2000    CABG X3 LIMA-LAD, SVG-DIAG DBG-RCA DR. Naomy Valle COLONOSCOPY      CORONARY ANGIOPLASTY WITH STENT PLACEMENT  2003    X1 STENT DR Roseanne Ahumada Dr. Reynold Rea CYSTOSCOPY Right 10/06/2014    cysto with stent    CYSTOSCOPY  05/07/2019    transurethral resection bladder by Dr. Rae Riedel 5/7/2019    CYSTOSCOPY TRANSURETHRAL RESECTION BLADDER performed by Romel Tomas MD at [facility] [address] CATH LAB PROCEDURE  11/2005 - 2/2014    BOTH TIMES SHOWING ALL 3 GRAFTS PATENT WITH OCCLUDED LCX / ATTEMPTED PCI TO LCX WAS UNSUCCESSFUL    PACEMAKER PLACEMENT          Medications Prior to Admission:     Prior to Admission medications    Medication Sig Start Date End Date Taking? Authorizing Provider   levothyroxine (SYNTHROID) 50 MCG tablet TAKE 1 TABLET BY MOUTH ONE TIME A DAY  7/28/20  Yes Sofi Barrientos MD   losartan (COZAAR) 25 MG tablet TAKE 1 TABLET BY MOUTH ONE TIME A DAY 11/14/19  Yes Historical Provider, MD   citalopram (CELEXA) 10 MG tablet Take 1 tablet by mouth daily 8/20/19  Yes Sofi Barrientos MD   trospium (SANCTURA) 20 MG tablet Take 20 mg by mouth 2 times daily   Yes Historical Provider, MD   fludrocortisone (FLORINEF) 0.1 MG tablet TAKE 1 TABLET BY MOUTH ONE TIME A DAY 3/13/17  Yes Sofi Barrientos MD   meclizine (ANTIVERT) 25 MG tablet Take 25 mg by mouth as needed for Dizziness    Yes Historical Provider, MD   metoprolol tartrate (LOPRESSOR) 25 MG tablet Take 1 tablet by mouth 2 times daily 10/17/16  Yes Sofi Barrientos MD   allopurinol (ZYLOPRIM) 100 MG tablet daily Indications: TAKES DAILY  11/6/15  Yes Historical Provider, MD   fluocinonide (LIDEX) 0.05 % cream as needed  2/23/15  Yes Historical Provider, MD   predniSONE (DELTASONE) 1 MG tablet Take 3 mg by mouth daily     Historical Provider, MD   aspirin 81 MG tablet Take 81 mg by mouth daily. Historical Provider, MD   nitroGLYCERIN (NITROSTAT) 0.4 MG SL tablet Place 0.4 mg under the tongue every 5 minutes as needed for Chest pain. Historical Provider, MD        Allergies:     Patient has no known allergies.     Social History:     Tobacco:    reports that he quit smoking about 22 mucous membranes moist  Neck: supple, no carotid bruits, thyroid not palpable  Lungs: Bilateral equal air entry, clear to ausculation, no wheezing, rales or rhonchi, normal effort  Cardiovascular: HR  normal rate, regular rhythm, no murmur, gallop, rub. Abdomen: Soft, nontender, nondistended, normal bowel sounds, no hepatomegaly or splenomegaly  Neurologic: There are no new focal motor or sensory deficits, normal muscle tone and bulk, no abnormal sensation, normal speech, cranial nerves II through XII grossly intact  Skin: No gross lesions, rashes, bruising or bleeding on exposed skin area  Extremities:  peripheral pulses palpable, no pedal edema or calf pain with palpation  Psych: normal affect     Investigations:      Laboratory Testing:  No results found for this or any previous visit (from the past 24 hour(s)). No results for input(s): HGB, HCT, WBC, MCV, PLATELET, NA, K, CL, CO2, BUN, CREATININE, GLUCOSE, INR, PROTIME, APTT, AST, ALT, LABALBU, HCG in the last 720 hours. Recent Labs     08/17/20  0830   COVID19 Not Detected     Imaging/Diagnostics:        Diagnosis:      1. Bladder cancer    Plans:     1.  Cystoscopy bladder biopsy with cauterization      BEN Jimenez - CNP  8/21/2020  9:49 AM

## 2020-08-21 NOTE — ANESTHESIA POSTPROCEDURE EVALUATION
Department of Anesthesiology  Postprocedure Note    Patient: Griselda Feliz  MRN: [id number]  YOB: 1935  Date of evaluation: 8/21/2020  Time:  12:39 PM     Procedure Summary     Date:  08/21/20 Room / Location:  [facility] To / [facility] - INPATIENT    Anesthesia Start:  7118 Anesthesia Stop:  1005    Procedure:  CYSTOSCOPY (N/A ) Diagnosis:  (DX BLADDER CA)    Surgeon:  Robina Loya MD Responsible Provider:  Liberty Coronado DO    Anesthesia Type:  MAC, general ASA Status:  3          Anesthesia Type: MAC, general    Karol Phase I:      Karol Phase II: Karol Score: 8    Last vitals: Reviewed and per EMR flowsheets.        Anesthesia Post Evaluation    Patient location during evaluation: PACU  Patient participation: complete - patient participated  Level of consciousness: awake and alert  Airway patency: patent  Nausea & Vomiting: no nausea and no vomiting  Complications: no  Cardiovascular status: hemodynamically stable  Respiratory status: acceptable  Hydration status: stable

## 2020-08-21 NOTE — ANESTHESIA PRE PROCEDURE
PLACEMENT  2017    CRT    CARDIAC SURGERY  2000    CABG X3 LIMA-LAD, SVG-DIAG DBG-RCA DR. Maryam Smith      CORONARY ANGIOPLASTY WITH STENT PLACEMENT      [place]    CYSTOSCOPY Right 10/06/2014    cysto with stent    CYSTOSCOPY  2019    transurethral resection bladder by Dr. Boris Love 2019    CYSTOSCOPY TRANSURETHRAL RESECTION BLADDER performed by Alexei Ga MD at [facility] [address] CATH LAB PROCEDURE  2005 - 2014    BOTH TIMES SHOWING ALL 3 GRAFTS PATENT WITH OCCLUDED LCX / ATTEMPTED PCI TO LCX WAS UNSUCCESSFUL    PACEMAKER PLACEMENT         Social History:    Social History     Tobacco Use    Smoking status: Former Smoker     Last attempt to quit: 1997     Years since quittin.7    Smokeless tobacco: Current User     Types: Chew   Substance Use Topics    Alcohol use: No     Comment: socially                                Ready to quit: Not Answered  Counseling given: Not Answered      Vital Signs (Current):   Vitals:    20 0923 20 0925 20 0940   BP:  (!) 170/81 (!) 156/77   Pulse:  86 79   Resp:  16    Temp:  97.5 °F (36.4 °C)    TempSrc:  Temporal    SpO2:  99%    Weight: 178 lb (80.7 kg)     Height: 6' (1.829 m)                                                BP Readings from Last 3 Encounters:   20 (!) 156/77   19 120/76   19 118/80       NPO Status: Time of last liquid consumption: 1800                        Time of last solid consumption: 1800                        Date of last liquid consumption: 20                        Date of last solid food consumption: 20    BMI:   Wt Readings from Last 3 Encounters:   20 178 lb (80.7 kg)   19 187 lb 3.2 oz (84.9 kg)   19 185 lb 3.2 oz (84 kg)     Body mass index is 24.14 kg/m².     CBC:   Lab Results   Component Value Date    WBC 8.5 2020    RBC 4.89 2020 RBC 4.85 01/31/2012    HGB 15.3 04/30/2020    HCT 46.0 04/30/2020    MCV 94.1 04/30/2020    RDW 14.1 04/30/2020     04/30/2020     01/31/2012       CMP:   Lab Results   Component Value Date     04/30/2020    K 4.3 04/30/2020     04/30/2020    CO2 26 04/30/2020    BUN 18 04/30/2020    CREATININE 1.33 04/30/2020    GFRAA >60 04/30/2020    LABGLOM 51 04/30/2020    GLUCOSE 115 04/30/2020    GLUCOSE 122 01/31/2012    PROT 7.2 04/30/2020    CALCIUM 9.6 04/30/2020    BILITOT 0.60 04/30/2020    ALKPHOS 75 04/30/2020    AST 17 04/30/2020    ALT 13 04/30/2020       POC Tests: No results for input(s): POCGLU, POCNA, POCK, POCCL, POCBUN, POCHEMO, POCHCT in the last 72 hours.     Coags:   Lab Results   Component Value Date    PROTIME 10.6 08/29/2016    INR 1.0 08/29/2016    APTT 22.4 08/29/2016       HCG (If Applicable): No results found for: PREGTESTUR, PREGSERUM, HCG, HCGQUANT     ABGs: No results found for: PHART, PO2ART, ELM2KPF, GHS8MPZ, BEART, X6PKAKIH     Type & Screen (If Applicable):  No results found for: LABABO, LABRH    Drug/Infectious Status (If Applicable):  No results found for: HIV, HEPCAB    COVID-19 Screening (If Applicable):   Lab Results   Component Value Date    COVID19 Not Detected 08/17/2020         Anesthesia Evaluation  Patient summary reviewed and Nursing notes reviewed no history of anesthetic complications:   Airway: Mallampati: II  TM distance: >3 FB   Neck ROM: full  Mouth opening: > = 3 FB Dental: normal exam         Pulmonary:normal exam    (+) COPD:  shortness of breath:  sleep apnea:  asthma:                            Cardiovascular:  Exercise tolerance: no interval change,   (+) hypertension:, pacemaker: pacemaker, AICD and no interval change, past MI:, CAD:, CABG/stent:, CHF:,       ECG reviewed    Rate: normal                    Neuro/Psych:      (-) seizures and CVA           GI/Hepatic/Renal:   (+) GERD:,           Endo/Other:    (+) hypothyroidism: arthritis:., .                 Abdominal:           Vascular:                                        Anesthesia Plan      MAC and general     ASA 3       Induction: intravenous. Anesthetic plan and risks discussed with patient. Plan discussed with CRNA.     Attending anesthesiologist reviewed and agrees with Pre Eval content              Sendy Renee DO   8/21/2020

## 2020-09-02 NOTE — TELEPHONE ENCOUNTER
Dom Ramirez is calling to request a refill on the following medication(s):    Medication Request:  Requested Prescriptions     Pending Prescriptions Disp Refills    levothyroxine (SYNTHROID) 50 MCG tablet [Pharmacy Med Name: Levothyroxine Sodium Oral Tablet 50 MCG] 30 tablet 3     Sig: TAKE 1 TABLET BY MOUTH ONE TIME A DAY     Last 7/28/2020  Last Visit Date (If Applicable):  12/39/6862    Next Visit Date:    10/13/2020

## 2020-10-12 NOTE — TELEPHONE ENCOUNTER
Marta Fitch is calling to request a refill on the following medication(s):    Medication Request:  Requested Prescriptions     Pending Prescriptions Disp Refills    levothyroxine (SYNTHROID) 50 MCG tablet [Pharmacy Med Name: Levothyroxine Sodium Oral Tablet 50 MCG] 45 tablet 0     Sig: TAKE 1 TABLET BY MOUTH ONE TIME A DAY       Last Visit Date (If Applicable):  33/66/9325    Next Visit Date:    Visit date not found

## 2020-11-13 NOTE — TELEPHONE ENCOUNTER
Tesha Grimes is calling to request a refill on the following medication(s):    Medication Request:  Requested Prescriptions     Pending Prescriptions Disp Refills    levothyroxine (SYNTHROID) 50 MCG tablet [Pharmacy Med Name: Levothyroxine Sodium Oral Tablet 50 MCG] 30 tablet 0     Sig: TAKE 1 TABLET BY MOUTH ONE TIME A DAY       Last Visit Date (If Applicable):  95/67/8042    Next Visit Date:    11/25/2020

## 2020-11-25 NOTE — PATIENT INSTRUCTIONS
Personalized Preventive Plan for Juan Oro - 11/25/2020  Medicare offers a range of preventive health benefits. Some of the tests and screenings are paid in full while other may be subject to a deductible, co-insurance, and/or copay. Some of these benefits include a comprehensive review of your medical history including lifestyle, illnesses that may run in your family, and various assessments and screenings as appropriate. After reviewing your medical record and screening and assessments performed today your provider may have ordered immunizations, labs, imaging, and/or referrals for you. A list of these orders (if applicable) as well as your Preventive Care list are included within your After Visit Summary for your review. Other Preventive Recommendations:    · A preventive eye exam performed by an eye specialist is recommended every 1-2 years to screen for glaucoma; cataracts, macular degeneration, and other eye disorders. · A preventive dental visit is recommended every 6 months. · Try to get at least 150 minutes of exercise per week or 10,000 steps per day on a pedometer . · Order or download the FREE \"Exercise & Physical Activity: Your Everyday Guide\" from [employer]. Call 1-116.788.4820 or search [employer] online. · You need 5631-4744 mg of calcium and 0821-3535 IU of vitamin D per day. It is possible to meet your calcium requirement with diet alone, but a vitamin D supplement is usually necessary to meet this goal.  · When exposed to the sun, use a sunscreen that protects against both UVA and UVB radiation with an SPF of 30 or greater. Reapply every 2 to 3 hours or after sweating, drying off with a towel, or swimming. · Always wear a seat belt when traveling in a car. Always wear a helmet when riding a bicycle or motorcycle.

## 2020-11-25 NOTE — PROGRESS NOTES
Subjective:      Patient ID: Brenden Joyner is a 82 y.o. male. Congestive Heart Failure   Presents for follow-up visit. Associated symptoms include fatigue. The symptoms have been improving. Compliance with total regimen is %. Compliance with diet is 51-75%. Compliance with exercise is 26-50%. Compliance with medications is %. Review of Systems   Constitutional: Positive for fatigue. HENT: Negative. Eyes: Negative. Respiratory: Negative. Cardiovascular: Negative. Gastrointestinal: Negative. Endocrine: Negative. Musculoskeletal: Positive for arthralgias and back pain. Skin: Negative. Allergic/Immunologic: Negative. Neurological: Negative. Hematological: Negative. Psychiatric/Behavioral: Positive for dysphoric mood. The patient is nervous/anxious. Past family and social history unremarkable. Diagnosis Orders   1. Routine general medical examination at a health care facility     2. 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 Marlnee Stevens     3. Polymyalgia rheumatica ([address]     4. Chronic systolic congestive heart failure, NYHA class 2 ([address]     5. Mild intermittent asthma without complication     6. S/P CABG x 3 (2009)     7. Systolic congestive heart failure, NYHA class 2, unspecified congestive heart failure chronicity ([address]     8. S/P cardiac cath (4/24/14-Dr. Marlene Stevens)     9. Anxiety     10. Coronary artery disease involving native coronary artery of native heart without angina pectoris     11. Stented coronary artery     12. Mixed hyperlipidemia     13. Gastroesophageal reflux disease without esophagitis     14. Ureteral calculus, right     15. IFG (impaired fasting glucose)     16. Renal insufficiency     17. Essential hypertension     18. Bladder polyp     19. Pneumococcal vaccination declined     20. Influenza vaccination declined     24. On prednisone therapy     22. Neurocardiogenic syncope     23.  Hypothyroidism, unspecified type           Objective:   Physical Exam  Vitals signs and nursing note reviewed. Constitutional:       Appearance: He is well-developed. HENT:      Head: Normocephalic and atraumatic. Right Ear: External ear normal.      Left Ear: External ear normal.      Nose: Nose normal.   Eyes:      Conjunctiva/sclera: Conjunctivae normal.      Pupils: Pupils are equal, round, and reactive to light. Neck:      Musculoskeletal: Normal range of motion and neck supple. Cardiovascular:      Rate and Rhythm: Normal rate and regular rhythm. Heart sounds: Normal heart sounds. Comments: Coronary artery disease  Neurocardiogenic syncope  CHF  PCI and stent  Hyperlipidemia  Pulmonary:      Effort: Pulmonary effort is normal.      Breath sounds: Normal breath sounds. Abdominal:      General: Bowel sounds are normal.      Palpations: Abdomen is soft. Musculoskeletal: Normal range of motion. Comments: Degenerative polyarthralgia  Spondylosis without any sign of myelopathy  Polymyalgia rheumatic-3 mg prednisone per day by rheumatology   Skin:     General: Skin is warm and dry. Neurological:      Mental Status: He is alert and oriented to person, place, and time. Deep Tendon Reflexes: Reflexes are normal and symmetric. Psychiatric:      Comments: Anxiety/depression on citalopram         Assessment:       Diagnosis Orders   1. Routine general medical examination at a health care facility     2. 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 Balbina Elliott     3. Polymyalgia rheumatica ([address]     4. Chronic systolic congestive heart failure, NYHA class 2 ([address]     5. Mild intermittent asthma without complication     6. S/P CABG x 3 (2009)     7. Systolic congestive heart failure, NYHA class 2, unspecified congestive heart failure chronicity ([address]     8. S/P cardiac cath (4/24/14-Dr. Balbina Elliott)     9. Anxiety     10.  Coronary artery disease involving native coronary artery of native heart without angina pectoris     11. Stented coronary artery     12. Mixed hyperlipidemia     13. Gastroesophageal reflux disease without esophagitis     14. Ureteral calculus, right     15. IFG (impaired fasting glucose)     16. Renal insufficiency     17. Essential hypertension     18. Bladder polyp     19. Pneumococcal vaccination declined     20. Influenza vaccination declined     24. On prednisone therapy     22. Neurocardiogenic syncope     23. Hypothyroidism, unspecified type             Plan:      30-year-old  male is brought in by his daughter for Medicare annual check. He is afebrile hemodynamically stable, clinical examination is stable at baseline  Anxiety and depression. I advised him to increase his citalopram to 20 mg p.o. daily. He denies suicidality, delusion or hallucinations  Impaired fasting glucose. He will benefit from low-fat high-fiber diet, daily moderate exercise and lifestyle change  Chronic kidney disease stage III with creatinine 1.3. Avoid nephrotoxic drugs, anti-inflammatory radiocontrast.  Maintain oral hydration  Congestive heart failure with underlying AICD. He is established with cardiology. No recent decompensation  Neurocardiogenic syncope. Maintain oral hydration. He does not express any orthostatic signs  Hypothyroidism on levothyroxine that he is tolerating well  Hypertension well-controlled to Cozaar, metoprolol. Consume less than 2 g of salt a day  Polymyalgia rheumatica on 3 mg prednisone per day by rheumatology. Pain is well controlled  Gout on allopurinol  Vertigo stable on as needed Antivert. Neurologically intact  Coronary artery disease. He is established with cardiology  Congestive heart failure. He is diuresing well negative fluid balance  GERD.   He may take over-the-counter Maalox/Mylanta  He is updated on eye exam by ophthalmology  Patient agree to influenza pneumococcal vaccine  Med list and available labs reviewed, discussed with patient, questions answered  Patient lives with his daughter and comfortable with her living situation  He denies tobacco, alcohol or illicit drug use  This note is created with a voice recognition program and while intend to generate a document that accurately reflects the content of the visit, no guarantee can be provided that every mistake has been identified and corrected by editing.           Richardson Mendez MD

## 2021-01-01 ENCOUNTER — APPOINTMENT (OUTPATIENT)
Dept: GENERAL RADIOLOGY | Age: 86
DRG: 871 | End: 2021-01-01
Payer: MEDICARE

## 2021-01-01 ENCOUNTER — HOSPITAL ENCOUNTER (INPATIENT)
Age: 86
LOS: 4 days | DRG: 871 | End: 2021-08-06
Attending: EMERGENCY MEDICINE | Admitting: INTERNAL MEDICINE
Payer: MEDICARE

## 2021-01-01 ENCOUNTER — APPOINTMENT (OUTPATIENT)
Dept: ULTRASOUND IMAGING | Age: 86
DRG: 871 | End: 2021-01-01
Payer: MEDICARE

## 2021-01-01 ENCOUNTER — ANESTHESIA (OUTPATIENT)
Dept: OPERATING ROOM | Age: 86
End: 2021-01-01
Payer: MEDICARE

## 2021-01-01 ENCOUNTER — ANESTHESIA EVENT (OUTPATIENT)
Dept: OPERATING ROOM | Age: 86
End: 2021-01-01
Payer: MEDICARE

## 2021-01-01 ENCOUNTER — APPOINTMENT (OUTPATIENT)
Dept: GENERAL RADIOLOGY | Age: 86
End: 2021-01-01
Attending: UROLOGY
Payer: MEDICARE

## 2021-01-01 ENCOUNTER — HOSPITAL ENCOUNTER (OUTPATIENT)
Age: 86
Setting detail: OUTPATIENT SURGERY
Discharge: HOME | End: 2021-06-29
Attending: UROLOGY | Admitting: UROLOGY
Payer: MEDICARE

## 2021-01-01 ENCOUNTER — HOSPITAL ENCOUNTER (OUTPATIENT)
Age: 86
Setting detail: OUTPATIENT SURGERY
Discharge: HOME | End: 2021-04-02
Attending: UROLOGY | Admitting: UROLOGY
Payer: MEDICARE

## 2021-01-01 ENCOUNTER — APPOINTMENT (OUTPATIENT)
Dept: CT IMAGING | Age: 86
DRG: 871 | End: 2021-01-01
Payer: MEDICARE

## 2021-01-01 ENCOUNTER — HOSPITAL ENCOUNTER (OUTPATIENT)
Dept: LAB | Age: 86
Setting detail: SPECIMEN
Discharge: HOME | End: 2021-03-29
Payer: MEDICARE

## 2021-01-01 VITALS — SYSTOLIC BLOOD PRESSURE: 100 MMHG | OXYGEN SATURATION: 96 % | DIASTOLIC BLOOD PRESSURE: 54 MMHG

## 2021-01-01 VITALS
HEART RATE: 41 BPM | BODY MASS INDEX: 24.14 KG/M2 | SYSTOLIC BLOOD PRESSURE: 69 MMHG | WEIGHT: 178 LBS | TEMPERATURE: 97.7 F | RESPIRATION RATE: 44 BRPM | OXYGEN SATURATION: 57 % | DIASTOLIC BLOOD PRESSURE: 41 MMHG

## 2021-01-01 VITALS
WEIGHT: 179.1 LBS | OXYGEN SATURATION: 97 % | TEMPERATURE: 97.3 F | DIASTOLIC BLOOD PRESSURE: 99 MMHG | SYSTOLIC BLOOD PRESSURE: 152 MMHG | HEART RATE: 65 BPM | BODY MASS INDEX: 24.26 KG/M2 | HEIGHT: 72 IN | RESPIRATION RATE: 19 BRPM

## 2021-01-01 VITALS
HEIGHT: 72 IN | SYSTOLIC BLOOD PRESSURE: 150 MMHG | TEMPERATURE: 97.8 F | WEIGHT: 178 LBS | DIASTOLIC BLOOD PRESSURE: 91 MMHG | HEART RATE: 87 BPM | BODY MASS INDEX: 24.11 KG/M2 | OXYGEN SATURATION: 93 % | RESPIRATION RATE: 14 BRPM

## 2021-01-01 VITALS — DIASTOLIC BLOOD PRESSURE: 54 MMHG | TEMPERATURE: 95.4 F | SYSTOLIC BLOOD PRESSURE: 101 MMHG | OXYGEN SATURATION: 100 %

## 2021-01-01 DIAGNOSIS — Z01.818 PREOP TESTING: Primary | ICD-10-CM

## 2021-01-01 DIAGNOSIS — D49.4 BLADDER TUMOR: Primary | ICD-10-CM

## 2021-01-01 DIAGNOSIS — I50.9 ACUTE ON CHRONIC CONGESTIVE HEART FAILURE, UNSPECIFIED HEART FAILURE TYPE (HCC): ICD-10-CM

## 2021-01-01 DIAGNOSIS — I21.4 NSTEMI (NON-ST ELEVATED MYOCARDIAL INFARCTION) (HCC): ICD-10-CM

## 2021-01-01 DIAGNOSIS — J18.9 PNEUMONIA OF RIGHT LUNG DUE TO INFECTIOUS ORGANISM, UNSPECIFIED PART OF LUNG: Primary | ICD-10-CM

## 2021-01-01 LAB
-: NORMAL
ABSOLUTE EOS #: 0 K/UL (ref 0–0.4)
ABSOLUTE EOS #: 0 K/UL (ref 0–0.4)
ABSOLUTE EOS #: 0 K/UL (ref 0–0.44)
ABSOLUTE EOS #: 0.03 K/UL (ref 0–0.44)
ABSOLUTE EOS #: 0.03 K/UL (ref 0–0.44)
ABSOLUTE EOS #: 0.59 K/UL (ref 0–0.44)
ABSOLUTE IMMATURE GRANULOCYTE: 0 K/UL (ref 0–0.3)
ABSOLUTE IMMATURE GRANULOCYTE: 0.03 K/UL (ref 0–0.3)
ABSOLUTE IMMATURE GRANULOCYTE: 0.11 K/UL (ref 0–0.3)
ABSOLUTE IMMATURE GRANULOCYTE: 0.13 K/UL (ref 0–0.3)
ABSOLUTE IMMATURE GRANULOCYTE: 0.25 K/UL (ref 0–0.3)
ABSOLUTE IMMATURE GRANULOCYTE: 0.25 K/UL (ref 0–0.3)
ABSOLUTE LYMPH #: 0.25 K/UL (ref 1.1–3.7)
ABSOLUTE LYMPH #: 0.25 K/UL (ref 1–4.8)
ABSOLUTE LYMPH #: 0.35 K/UL (ref 1–4.8)
ABSOLUTE LYMPH #: 1 K/UL (ref 1.1–3.7)
ABSOLUTE LYMPH #: 1.06 K/UL (ref 1.1–3.7)
ABSOLUTE LYMPH #: 1.79 K/UL (ref 1.1–3.7)
ABSOLUTE MONO #: 0.35 K/UL (ref 0.1–0.8)
ABSOLUTE MONO #: 0.69 K/UL (ref 0.1–1.2)
ABSOLUTE MONO #: 1.01 K/UL (ref 0.1–0.8)
ABSOLUTE MONO #: 1.03 K/UL (ref 0.1–1.2)
ABSOLUTE MONO #: 1.24 K/UL (ref 0.1–1.2)
ABSOLUTE MONO #: 1.24 K/UL (ref 0.1–1.2)
ALBUMIN (CALCULATED): 2.7 G/DL (ref 3.2–5.2)
ALBUMIN PERCENT: 51 % (ref 45–65)
ALBUMIN SERPL-MCNC: 3.2 G/DL (ref 3.5–5.2)
ALBUMIN/GLOBULIN RATIO: 0.9 (ref 1–2.5)
ALLEN TEST: ABNORMAL
ALLEN TEST: ABNORMAL
ALLEN TEST: POSITIVE
ALP BLD-CCNC: 123 U/L (ref 40–129)
ALPHA 1 PERCENT: 7 % (ref 3–6)
ALPHA 2 PERCENT: 14 % (ref 6–13)
ALPHA-1-GLOBULIN: 0.4 G/DL (ref 0.1–0.4)
ALPHA-2-GLOBULIN: 0.7 G/DL (ref 0.5–0.9)
ALT SERPL-CCNC: 11 U/L (ref 5–41)
AMORPHOUS: NORMAL
AMYLASE: 17 U/L (ref 28–100)
ANCA MYELOPEROXIDASE: <0.3 AU/ML (ref 0–3.5)
ANCA PROTEINASE 3: <0.7 AU/ML (ref 0–2)
ANION GAP SERPL CALCULATED.3IONS-SCNC: 12 MMOL/L (ref 9–17)
ANION GAP SERPL CALCULATED.3IONS-SCNC: 17 MMOL/L (ref 9–17)
ANION GAP SERPL CALCULATED.3IONS-SCNC: 20 MMOL/L (ref 9–17)
ANION GAP SERPL CALCULATED.3IONS-SCNC: 25 MMOL/L (ref 9–17)
ANION GAP: 13 MMOL/L (ref 7–16)
ANTI DNA DOUBLE STRANDED: 0.8 IU/ML
ANTI-NUCLEAR ANTIBODY (ANA): NEGATIVE
AST SERPL-CCNC: 33 U/L
BACTERIA: NORMAL
BASOPHILS # BLD: 0 % (ref 0–2)
BASOPHILS # BLD: 1 % (ref 0–2)
BASOPHILS # BLD: 1 % (ref 0–2)
BASOPHILS ABSOLUTE: 0 K/UL (ref 0–0.2)
BASOPHILS ABSOLUTE: 0 K/UL (ref 0–0.2)
BASOPHILS ABSOLUTE: 0.03 K/UL (ref 0–0.2)
BASOPHILS ABSOLUTE: 0.06 K/UL (ref 0–0.2)
BASOPHILS ABSOLUTE: 0.09 K/UL (ref 0–0.2)
BASOPHILS ABSOLUTE: 0.17 K/UL (ref 0–0.2)
BETA GLOBULIN: 0.6 G/DL (ref 0.5–1.1)
BETA PERCENT: 12 % (ref 11–19)
BILIRUB SERPL-MCNC: 1.22 MG/DL (ref 0.3–1.2)
BILIRUBIN URINE: NEGATIVE
BNP INTERPRETATION: ABNORMAL
BUN BLDV-MCNC: 24 MG/DL (ref 8–23)
BUN BLDV-MCNC: 25 MG/DL (ref 8–23)
BUN BLDV-MCNC: 33 MG/DL (ref 8–23)
BUN BLDV-MCNC: 42 MG/DL (ref 8–23)
BUN BLDV-MCNC: 57 MG/DL (ref 8–23)
BUN BLDV-MCNC: 65 MG/DL (ref 8–23)
BUN/CREAT BLD: 21 (ref 9–20)
BUN/CREAT BLD: ABNORMAL (ref 9–20)
CALCIUM SERPL-MCNC: 8 MG/DL (ref 8.6–10.4)
CALCIUM SERPL-MCNC: 8.2 MG/DL (ref 8.6–10.4)
CALCIUM SERPL-MCNC: 8.3 MG/DL (ref 8.6–10.4)
CALCIUM SERPL-MCNC: 8.4 MG/DL (ref 8.6–10.4)
CALCIUM SERPL-MCNC: 8.9 MG/DL (ref 8.6–10.4)
CALCIUM SERPL-MCNC: 9 MG/DL (ref 8.6–10.4)
CARBOXYHEMOGLOBIN: 2.1 % (ref 0–5)
CASTS UA: NORMAL /LPF
CASTS UA: NORMAL /LPF
CASTS UA: NORMAL /LPF (ref 0–8)
CHLORIDE BLD-SCNC: 101 MMOL/L (ref 98–107)
CHLORIDE BLD-SCNC: 102 MMOL/L (ref 98–107)
CHLORIDE BLD-SCNC: 102 MMOL/L (ref 98–107)
CHLORIDE BLD-SCNC: 106 MMOL/L (ref 98–107)
CHLORIDE BLD-SCNC: 107 MMOL/L (ref 98–107)
CHLORIDE BLD-SCNC: 108 MMOL/L (ref 98–107)
CO2: 17 MMOL/L (ref 20–31)
CO2: 18 MMOL/L (ref 20–31)
CO2: 18 MMOL/L (ref 20–31)
CO2: 22 MMOL/L (ref 20–31)
COLOR: YELLOW
COMMENT UA: ABNORMAL
COMPLEMENT C3: 126 MG/DL (ref 90–180)
COMPLEMENT C4: 26 MG/DL (ref 10–40)
CREAT SERPL-MCNC: 1.2 MG/DL (ref 0.7–1.2)
CREAT SERPL-MCNC: 1.37 MG/DL (ref 0.7–1.2)
CREAT SERPL-MCNC: 1.61 MG/DL (ref 0.7–1.2)
CREAT SERPL-MCNC: 1.73 MG/DL (ref 0.7–1.2)
CREAT SERPL-MCNC: 1.8 MG/DL (ref 0.7–1.2)
CREAT SERPL-MCNC: 1.87 MG/DL (ref 0.7–1.2)
CRYSTALS, UA: NORMAL /HPF
CULTURE: NORMAL
DIFFERENTIAL TYPE: ABNORMAL
EKG ATRIAL RATE: 101 BPM
EKG ATRIAL RATE: 117 BPM
EKG ATRIAL RATE: 74 BPM
EKG P AXIS: 43 DEGREES
EKG P AXIS: 44 DEGREES
EKG P AXIS: 45 DEGREES
EKG P-R INTERVAL: 124 MS
EKG P-R INTERVAL: 140 MS
EKG P-R INTERVAL: 98 MS
EKG Q-T INTERVAL: 372 MS
EKG Q-T INTERVAL: 406 MS
EKG Q-T INTERVAL: 450 MS
EKG QRS DURATION: 122 MS
EKG QRS DURATION: 138 MS
EKG QRS DURATION: 142 MS
EKG QTC CALCULATION (BAZETT): 499 MS
EKG QTC CALCULATION (BAZETT): 518 MS
EKG QTC CALCULATION (BAZETT): 526 MS
EKG R AXIS: -69 DEGREES
EKG R AXIS: -82 DEGREES
EKG R AXIS: 19 DEGREES
EKG T AXIS: 79 DEGREES
EKG T AXIS: 87 DEGREES
EKG T AXIS: 89 DEGREES
EKG VENTRICULAR RATE: 101 BPM
EKG VENTRICULAR RATE: 117 BPM
EKG VENTRICULAR RATE: 74 BPM
ENA ANTIBODIES SCREEN: 0.2 U/ML
EOSINOPHILS RELATIVE PERCENT: 0 % (ref 1–4)
EOSINOPHILS RELATIVE PERCENT: 7 % (ref 1–4)
EPITHELIAL CELLS UA: NORMAL /HPF (ref 0–5)
FIO2: 100
FIO2: ABNORMAL
FIO2: ABNORMAL
GAMMA GLOBULIN %: 16 % (ref 9–20)
GAMMA GLOBULIN: 0.8 G/DL (ref 0.5–1.5)
GFR AFRICAN AMERICAN: 42 ML/MIN
GFR AFRICAN AMERICAN: 44 ML/MIN
GFR AFRICAN AMERICAN: 46 ML/MIN
GFR AFRICAN AMERICAN: 50 ML/MIN
GFR AFRICAN AMERICAN: >60 ML/MIN
GFR AFRICAN AMERICAN: >60 ML/MIN
GFR NON-AFRICAN AMERICAN: 35 ML/MIN
GFR NON-AFRICAN AMERICAN: 36 ML/MIN
GFR NON-AFRICAN AMERICAN: 38 ML/MIN
GFR NON-AFRICAN AMERICAN: 41 ML/MIN
GFR NON-AFRICAN AMERICAN: 46 ML/MIN
GFR NON-AFRICAN AMERICAN: 50 ML/MIN
GFR NON-AFRICAN AMERICAN: 58 ML/MIN
GFR SERPL CREATININE-BSD FRML MDRD: 56 ML/MIN
GFR SERPL CREATININE-BSD FRML MDRD: ABNORMAL ML/MIN/{1.73_M2}
GLUCOSE BLD-MCNC: 101 MG/DL (ref 70–99)
GLUCOSE BLD-MCNC: 124 MG/DL (ref 70–99)
GLUCOSE BLD-MCNC: 134 MG/DL (ref 70–99)
GLUCOSE BLD-MCNC: 138 MG/DL (ref 74–100)
GLUCOSE BLD-MCNC: 141 MG/DL (ref 70–99)
GLUCOSE BLD-MCNC: 151 MG/DL (ref 74–100)
GLUCOSE BLD-MCNC: 162 MG/DL (ref 70–99)
GLUCOSE BLD-MCNC: 99 MG/DL (ref 70–99)
GLUCOSE URINE: NEGATIVE
HAV IGM SER IA-ACNC: NONREACTIVE
HCO3 VENOUS: 19.2 MMOL/L (ref 22–29)
HCO3 VENOUS: 19.4 MMOL/L (ref 24–30)
HCT VFR BLD CALC: 40.6 % (ref 40.7–50.3)
HCT VFR BLD CALC: 41.6 % (ref 40.7–50.3)
HCT VFR BLD CALC: 41.9 % (ref 40.7–50.3)
HCT VFR BLD CALC: 44.2 % (ref 40.7–50.3)
HCT VFR BLD CALC: 45.1 % (ref 40.7–50.3)
HCT VFR BLD CALC: 46.6 % (ref 40.7–50.3)
HEMOGLOBIN: 12.7 G/DL (ref 13–17)
HEMOGLOBIN: 13.3 G/DL (ref 13–17)
HEMOGLOBIN: 13.7 G/DL (ref 13–17)
HEMOGLOBIN: 14 G/DL (ref 13–17)
HEMOGLOBIN: 14.7 G/DL (ref 13–17)
HEMOGLOBIN: 14.8 G/DL (ref 13–17)
HEPATITIS B CORE IGM ANTIBODY: NONREACTIVE
HEPATITIS B SURFACE ANTIGEN: NONREACTIVE
HEPATITIS C ANTIBODY: NONREACTIVE
IMMATURE GRANULOCYTES: 0 %
IMMATURE GRANULOCYTES: 0 %
IMMATURE GRANULOCYTES: 1 %
INR BLD: 1.1
KETONES, URINE: NEGATIVE
LACTIC ACID, SEPSIS WHOLE BLOOD: 3.4 MMOL/L (ref 0.5–1.9)
LACTIC ACID, SEPSIS WHOLE BLOOD: 6.5 MMOL/L (ref 0.5–1.9)
LACTIC ACID, SEPSIS: ABNORMAL MMOL/L (ref 0.5–1.9)
LACTIC ACID, SEPSIS: ABNORMAL MMOL/L (ref 0.5–1.9)
LACTIC ACID, WHOLE BLOOD: 1.7 MMOL/L (ref 0.7–2.1)
LACTIC ACID, WHOLE BLOOD: 1.8 MMOL/L (ref 0.7–2.1)
LACTIC ACID, WHOLE BLOOD: 2.4 MMOL/L (ref 0.7–2.1)
LEGIONELLA PNEUMOPHILIA AG, URINE: NEGATIVE
LEUKOCYTE ESTERASE, URINE: ABNORMAL
LEUKOCYTE ESTERASE, URINE: NEGATIVE
LEUKOCYTE ESTERASE, URINE: NEGATIVE
LIPASE: 9 U/L (ref 13–60)
LV EF: 35 %
LVEF MODALITY: NORMAL
LYMPHOCYTES # BLD: 1 % (ref 24–43)
LYMPHOCYTES # BLD: 1 % (ref 24–44)
LYMPHOCYTES # BLD: 2 % (ref 24–44)
LYMPHOCYTES # BLD: 21 % (ref 24–43)
LYMPHOCYTES # BLD: 6 % (ref 24–43)
LYMPHOCYTES # BLD: 6 % (ref 24–43)
Lab: NORMAL
MAGNESIUM: 3.1 MG/DL (ref 1.6–2.6)
MAGNESIUM: 3.3 MG/DL (ref 1.6–2.6)
MCH RBC QN AUTO: 30.2 PG (ref 25.2–33.5)
MCH RBC QN AUTO: 30.3 PG (ref 25.2–33.5)
MCH RBC QN AUTO: 30.6 PG (ref 25.2–33.5)
MCH RBC QN AUTO: 30.7 PG (ref 25.2–33.5)
MCHC RBC AUTO-ENTMCNC: 31.3 G/DL (ref 28.4–34.8)
MCHC RBC AUTO-ENTMCNC: 31.5 G/DL (ref 28.4–34.8)
MCHC RBC AUTO-ENTMCNC: 31.7 G/DL (ref 28.4–34.8)
MCHC RBC AUTO-ENTMCNC: 31.7 G/DL (ref 28.4–34.8)
MCHC RBC AUTO-ENTMCNC: 32.8 G/DL (ref 28.4–34.8)
MCHC RBC AUTO-ENTMCNC: 32.9 G/DL (ref 28.4–34.8)
MCV RBC AUTO: 92.9 FL (ref 82.6–102.9)
MCV RBC AUTO: 93.6 FL (ref 82.6–102.9)
MCV RBC AUTO: 95.3 FL (ref 82.6–102.9)
MCV RBC AUTO: 95.4 FL (ref 82.6–102.9)
MCV RBC AUTO: 97.1 FL (ref 82.6–102.9)
MCV RBC AUTO: 97.8 FL (ref 82.6–102.9)
METHEMOGLOBIN: ABNORMAL % (ref 0–1.5)
MODE: ABNORMAL
MONOCYTES # BLD: 2 % (ref 1–7)
MONOCYTES # BLD: 4 % (ref 1–7)
MONOCYTES # BLD: 5 % (ref 3–12)
MONOCYTES # BLD: 6 % (ref 3–12)
MONOCYTES # BLD: 7 % (ref 3–12)
MONOCYTES # BLD: 8 % (ref 3–12)
MORPHOLOGY: ABNORMAL
MUCUS: NORMAL
NEGATIVE BASE EXCESS, ART: 2 (ref 0–2)
NEGATIVE BASE EXCESS, VEN: 4 (ref 0–2)
NEGATIVE BASE EXCESS, VEN: 4.2 MMOL/L (ref 0–2)
NITRITE, URINE: NEGATIVE
NOTIFICATION TIME: ABNORMAL
NOTIFICATION: ABNORMAL
NRBC AUTOMATED: 0 PER 100 WBC
O2 DEVICE/FLOW/%: ABNORMAL
O2 SAT, VEN: 33 % (ref 60–85)
O2 SAT, VEN: 40.9 % (ref 60–85)
OTHER OBSERVATIONS UA: NORMAL
OXYHEMOGLOBIN: ABNORMAL % (ref 95–98)
PARTIAL THROMBOPLASTIN TIME: 23.6 SEC (ref 20.5–30.5)
PARTIAL THROMBOPLASTIN TIME: 36.5 SEC (ref 20.5–30.5)
PARTIAL THROMBOPLASTIN TIME: 37.4 SEC (ref 20.5–30.5)
PARTIAL THROMBOPLASTIN TIME: 41.7 SEC (ref 20.5–30.5)
PARTIAL THROMBOPLASTIN TIME: 42.8 SEC (ref 20.5–30.5)
PARTIAL THROMBOPLASTIN TIME: 45.7 SEC (ref 20.5–30.5)
PARTIAL THROMBOPLASTIN TIME: 52.8 SEC (ref 20.5–30.5)
PARTIAL THROMBOPLASTIN TIME: 62.5 SEC (ref 20.5–30.5)
PARTIAL THROMBOPLASTIN TIME: 63.2 SEC (ref 20.5–30.5)
PARTIAL THROMBOPLASTIN TIME: 64.6 SEC (ref 20.5–30.5)
PARTIAL THROMBOPLASTIN TIME: 70.2 SEC (ref 20.5–30.5)
PATHOLOGIST: ABNORMAL
PATHOLOGIST: NORMAL
PATIENT TEMP: 37
PATIENT TEMP: ABNORMAL
PATIENT TEMP: ABNORMAL
PCO2, VEN, TEMP ADJ: ABNORMAL MMHG (ref 39–55)
PCO2, VEN: 28.8 MM HG (ref 41–51)
PCO2, VEN: 33.4 (ref 39–55)
PDW BLD-RTO: 13.9 % (ref 11.8–14.4)
PDW BLD-RTO: 14.9 % (ref 11.8–14.4)
PDW BLD-RTO: 14.9 % (ref 11.8–14.4)
PDW BLD-RTO: 15 % (ref 11.8–14.4)
PDW BLD-RTO: 15.3 % (ref 11.8–14.4)
PDW BLD-RTO: 15.3 % (ref 11.8–14.4)
PEEP/CPAP: ABNORMAL
PH UA: 5.5 (ref 5–8)
PH UA: 6 (ref 5–8)
PH UA: 6 (ref 5–8)
PH VENOUS: 7.38 (ref 7.32–7.42)
PH VENOUS: 7.43 (ref 7.32–7.43)
PH, VEN, TEMP ADJ: ABNORMAL (ref 7.32–7.42)
PLATELET # BLD: 156 K/UL (ref 138–453)
PLATELET # BLD: 175 K/UL (ref 138–453)
PLATELET # BLD: 181 K/UL (ref 138–453)
PLATELET # BLD: 230 K/UL (ref 138–453)
PLATELET # BLD: 233 K/UL (ref 138–453)
PLATELET # BLD: 238 K/UL (ref 138–453)
PLATELET ESTIMATE: ABNORMAL
PMV BLD AUTO: 10.2 FL (ref 8.1–13.5)
PMV BLD AUTO: 10.9 FL (ref 8.1–13.5)
PMV BLD AUTO: 11.2 FL (ref 8.1–13.5)
PMV BLD AUTO: 11.7 FL (ref 8.1–13.5)
PMV BLD AUTO: 9.8 FL (ref 8.1–13.5)
PMV BLD AUTO: 9.8 FL (ref 8.1–13.5)
PO2, VEN, TEMP ADJ: ABNORMAL MMHG (ref 30–50)
PO2, VEN: 19.2 MM HG (ref 30–50)
PO2, VEN: 25.4 (ref 30–50)
POC BUN: 25 MG/DL (ref 8–26)
POC CHLORIDE: 112 MMOL/L (ref 98–107)
POC CREATININE: 1.45 MG/DL (ref 0.51–1.19)
POC HCO3: 20.4 MMOL/L (ref 21–28)
POC HEMATOCRIT: 49 % (ref 41–53)
POC HEMOGLOBIN: 16.7 G/DL (ref 13.5–17.5)
POC IONIZED CALCIUM: 1.15 MMOL/L (ref 1.15–1.33)
POC LACTIC ACID: 3.62 MMOL/L (ref 0.56–1.39)
POC LACTIC ACID: 6 MMOL/L (ref 0.56–1.39)
POC O2 SATURATION: 94 % (ref 94–98)
POC PCO2 TEMP: ABNORMAL MM HG
POC PCO2 TEMP: ABNORMAL MM HG
POC PCO2: 28.8 MM HG (ref 35–48)
POC PH TEMP: ABNORMAL
POC PH TEMP: ABNORMAL
POC PH: 7.46 (ref 7.35–7.45)
POC PO2 TEMP: ABNORMAL MM HG
POC PO2 TEMP: ABNORMAL MM HG
POC PO2: 64.8 MM HG (ref 83–108)
POC POTASSIUM: 4.4 MMOL/L (ref 3.5–4.5)
POC SODIUM: 144 MMOL/L (ref 138–146)
POC TCO2: 20 MMOL/L (ref 22–30)
POSITIVE BASE EXCESS, ART: ABNORMAL (ref 0–3)
POSITIVE BASE EXCESS, VEN: ABNORMAL (ref 0–3)
POSITIVE BASE EXCESS, VEN: ABNORMAL MMOL/L (ref 0–2)
POTASSIUM SERPL-SCNC: 3.1 MMOL/L (ref 3.7–5.3)
POTASSIUM SERPL-SCNC: 3.4 MMOL/L (ref 3.7–5.3)
POTASSIUM SERPL-SCNC: 3.9 MMOL/L (ref 3.7–5.3)
POTASSIUM SERPL-SCNC: 3.9 MMOL/L (ref 3.7–5.3)
POTASSIUM SERPL-SCNC: 4.1 MMOL/L (ref 3.7–5.3)
POTASSIUM SERPL-SCNC: 4.3 MMOL/L (ref 3.7–5.3)
PRO-BNP: 4625 PG/ML
PROCALCITONIN: 4.18 NG/ML
PROTEIN ELECTROPHORESIS, SERUM: ABNORMAL
PROTEIN UA: ABNORMAL
PROTEIN UA: NEGATIVE
PROTEIN UA: NEGATIVE
PROTHROMBIN TIME: 12.1 SEC (ref 9.1–12.3)
PSV: ABNORMAL
PT. POSITION: ABNORMAL
RBC # BLD: 4.15 M/UL (ref 4.21–5.77)
RBC # BLD: 4.39 M/UL (ref 4.21–5.77)
RBC # BLD: 4.48 M/UL (ref 4.21–5.77)
RBC # BLD: 4.64 M/UL (ref 4.21–5.77)
RBC # BLD: 4.8 M/UL (ref 4.21–5.77)
RBC # BLD: 4.82 M/UL (ref 4.21–5.77)
RBC # BLD: ABNORMAL 10*6/UL
RBC UA: NORMAL /HPF (ref 0–2)
RBC UA: NORMAL /HPF (ref 0–2)
RBC UA: NORMAL /HPF (ref 0–4)
RENAL EPITHELIAL, UA: NORMAL /HPF
RESPIRATORY RATE: ABNORMAL
SAMPLE SITE: ABNORMAL
SARS-COV-2, RAPID: NOT DETECTED
SARS-COV-2, RAPID: NOT DETECTED
SARS-COV-2: ABNORMAL
SARS-COV-2: ABNORMAL
SARS-COV-2: NORMAL
SARS-COV-2: NOT DETECTED
SEG NEUTROPHILS: 63 % (ref 36–65)
SEG NEUTROPHILS: 86 % (ref 36–65)
SEG NEUTROPHILS: 87 % (ref 36–65)
SEG NEUTROPHILS: 93 % (ref 36–65)
SEG NEUTROPHILS: 94 % (ref 36–66)
SEG NEUTROPHILS: 95 % (ref 36–66)
SEGMENTED NEUTROPHILS ABSOLUTE COUNT: 14.24 K/UL (ref 1.5–8.1)
SEGMENTED NEUTROPHILS ABSOLUTE COUNT: 15.89 K/UL (ref 1.5–8.1)
SEGMENTED NEUTROPHILS ABSOLUTE COUNT: 16.43 K/UL (ref 1.8–7.7)
SEGMENTED NEUTROPHILS ABSOLUTE COUNT: 23.06 K/UL (ref 1.5–8.1)
SEGMENTED NEUTROPHILS ABSOLUTE COUNT: 23.69 K/UL (ref 1.8–7.7)
SEGMENTED NEUTROPHILS ABSOLUTE COUNT: 5.41 K/UL (ref 1.5–8.1)
SERUM IFX INTERP: NORMAL
SET RATE: ABNORMAL
SODIUM BLD-SCNC: 137 MMOL/L (ref 135–144)
SODIUM BLD-SCNC: 139 MMOL/L (ref 135–144)
SODIUM BLD-SCNC: 140 MMOL/L (ref 135–144)
SODIUM BLD-SCNC: 141 MMOL/L (ref 135–144)
SODIUM BLD-SCNC: 142 MMOL/L (ref 135–144)
SODIUM BLD-SCNC: 144 MMOL/L (ref 135–144)
SOURCE: ABNORMAL
SOURCE: NORMAL
SPECIFIC GRAVITY UA: 1.02 (ref 1–1.03)
SPECIFIC GRAVITY UA: 1.02 (ref 1–1.03)
SPECIFIC GRAVITY UA: 1.03 (ref 1–1.03)
SPECIMEN DESCRIPTION: NORMAL
SURGICAL PATHOLOGY REPORT: NORMAL
TCO2 (CALC), ART: ABNORMAL MMOL/L (ref 22–29)
TEXT FOR RESPIRATORY: ABNORMAL
THYROXINE, FREE: 0.91 NG/DL (ref 0.93–1.7)
TOTAL CO2, VENOUS: ABNORMAL MMOL/L (ref 23–30)
TOTAL HB: ABNORMAL G/DL (ref 12–16)
TOTAL PROT. SUM,%: 100 % (ref 98–102)
TOTAL PROT. SUM: 5.2 G/DL (ref 6.3–8.2)
TOTAL PROTEIN: 5.2 G/DL (ref 6.4–8.3)
TOTAL PROTEIN: 6.7 G/DL (ref 6.4–8.3)
TOTAL RATE: ABNORMAL
TRICHOMONAS: NORMAL
TROPONIN INTERP: ABNORMAL
TROPONIN T: ABNORMAL NG/ML
TROPONIN, HIGH SENSITIVITY: 269 NG/L (ref 0–22)
TROPONIN, HIGH SENSITIVITY: 300 NG/L (ref 0–22)
TROPONIN, HIGH SENSITIVITY: 415 NG/L (ref 0–22)
TROPONIN, HIGH SENSITIVITY: 434 NG/L (ref 0–22)
TROPONIN, HIGH SENSITIVITY: 527 NG/L (ref 0–22)
TROPONIN, HIGH SENSITIVITY: 686 NG/L (ref 0–22)
TSH SERPL DL<=0.05 MIU/L-ACNC: 5.35 MIU/L (ref 0.3–5)
TURBIDITY: CLEAR
URINE HGB: ABNORMAL
UROBILINOGEN, URINE: NORMAL
UROTHELIAL CANCER DETECTION: NORMAL
VT: ABNORMAL
WBC # BLD: 16.4 K/UL (ref 3.5–11.3)
WBC # BLD: 17.3 K/UL (ref 3.5–11.3)
WBC # BLD: 18.4 K/UL (ref 3.5–11.3)
WBC # BLD: 24.8 K/UL (ref 3.5–11.3)
WBC # BLD: 25.2 K/UL (ref 3.5–11.3)
WBC # BLD: 8.6 K/UL (ref 3.5–11.3)
WBC # BLD: ABNORMAL 10*3/UL
WBC UA: NORMAL /HPF (ref 0–5)
YEAST: NORMAL

## 2021-01-01 PROCEDURE — 6360000002 HC RX W HCPCS: Performed by: STUDENT IN AN ORGANIZED HEALTH CARE EDUCATION/TRAINING PROGRAM

## 2021-01-01 PROCEDURE — 2580000003 HC RX 258: Performed by: STUDENT IN AN ORGANIZED HEALTH CARE EDUCATION/TRAINING PROGRAM

## 2021-01-01 PROCEDURE — 99222 1ST HOSP IP/OBS MODERATE 55: CPT | Performed by: INTERNAL MEDICINE

## 2021-01-01 PROCEDURE — 2700000000 HC OXYGEN THERAPY PER DAY

## 2021-01-01 PROCEDURE — 83880 ASSAY OF NATRIURETIC PEPTIDE: CPT

## 2021-01-01 PROCEDURE — 7100000010 HC PHASE II RECOVERY - FIRST 15 MIN: Performed by: UROLOGY

## 2021-01-01 PROCEDURE — 96374 THER/PROPH/DIAG INJ IV PUSH: CPT

## 2021-01-01 PROCEDURE — 94660 CPAP INITIATION&MGMT: CPT

## 2021-01-01 PROCEDURE — 83735 ASSAY OF MAGNESIUM: CPT

## 2021-01-01 PROCEDURE — 3209999900 FLUORO FOR SURGICAL PROCEDURES

## 2021-01-01 PROCEDURE — 80051 ELECTROLYTE PANEL: CPT

## 2021-01-01 PROCEDURE — C1769 GUIDE WIRE: HCPCS | Performed by: UROLOGY

## 2021-01-01 PROCEDURE — 83605 ASSAY OF LACTIC ACID: CPT

## 2021-01-01 PROCEDURE — 99232 SBSQ HOSP IP/OBS MODERATE 35: CPT | Performed by: INTERNAL MEDICINE

## 2021-01-01 PROCEDURE — 99285 EMERGENCY DEPT VISIT HI MDM: CPT

## 2021-01-01 PROCEDURE — 85730 THROMBOPLASTIN TIME PARTIAL: CPT

## 2021-01-01 PROCEDURE — 2580000003 HC RX 258: Performed by: ANESTHESIOLOGY

## 2021-01-01 PROCEDURE — 85025 COMPLETE CBC W/AUTO DIFF WBC: CPT

## 2021-01-01 PROCEDURE — 82803 BLOOD GASES ANY COMBINATION: CPT

## 2021-01-01 PROCEDURE — 85610 PROTHROMBIN TIME: CPT

## 2021-01-01 PROCEDURE — 7100000001 HC PACU RECOVERY - ADDTL 15 MIN: Performed by: UROLOGY

## 2021-01-01 PROCEDURE — C1758 CATHETER, URETERAL: HCPCS | Performed by: UROLOGY

## 2021-01-01 PROCEDURE — 7100000000 HC PACU RECOVERY - FIRST 15 MIN: Performed by: UROLOGY

## 2021-01-01 PROCEDURE — 84165 PROTEIN E-PHORESIS SERUM: CPT

## 2021-01-01 PROCEDURE — 93010 ELECTROCARDIOGRAM REPORT: CPT | Performed by: INTERNAL MEDICINE

## 2021-01-01 PROCEDURE — 6360000002 HC RX W HCPCS: Performed by: UROLOGY

## 2021-01-01 PROCEDURE — 93306 TTE W/DOPPLER COMPLETE: CPT

## 2021-01-01 PROCEDURE — 81001 URINALYSIS AUTO W/SCOPE: CPT

## 2021-01-01 PROCEDURE — 84439 ASSAY OF FREE THYROXINE: CPT

## 2021-01-01 PROCEDURE — 6370000000 HC RX 637 (ALT 250 FOR IP): Performed by: STUDENT IN AN ORGANIZED HEALTH CARE EDUCATION/TRAINING PROGRAM

## 2021-01-01 PROCEDURE — 36415 COLL VENOUS BLD VENIPUNCTURE: CPT

## 2021-01-01 PROCEDURE — 86225 DNA ANTIBODY NATIVE: CPT

## 2021-01-01 PROCEDURE — 88120 CYTP URNE 3-5 PROBES EA SPEC: CPT

## 2021-01-01 PROCEDURE — 2500000003 HC RX 250 WO HCPCS: Performed by: INTERNAL MEDICINE

## 2021-01-01 PROCEDURE — 82150 ASSAY OF AMYLASE: CPT

## 2021-01-01 PROCEDURE — 3700000000 HC ANESTHESIA ATTENDED CARE: Performed by: UROLOGY

## 2021-01-01 PROCEDURE — C1726 CATH, BAL DIL, NON-VASCULAR: HCPCS | Performed by: UROLOGY

## 2021-01-01 PROCEDURE — 86334 IMMUNOFIX E-PHORESIS SERUM: CPT

## 2021-01-01 PROCEDURE — 93005 ELECTROCARDIOGRAM TRACING: CPT | Performed by: ANESTHESIOLOGY

## 2021-01-01 PROCEDURE — 82947 ASSAY GLUCOSE BLOOD QUANT: CPT

## 2021-01-01 PROCEDURE — 71260 CT THORAX DX C+: CPT

## 2021-01-01 PROCEDURE — 84520 ASSAY OF UREA NITROGEN: CPT

## 2021-01-01 PROCEDURE — C1894 INTRO/SHEATH, NON-LASER: HCPCS | Performed by: UROLOGY

## 2021-01-01 PROCEDURE — 51702 INSERT TEMP BLADDER CATH: CPT

## 2021-01-01 PROCEDURE — 6370000000 HC RX 637 (ALT 250 FOR IP)

## 2021-01-01 PROCEDURE — 85014 HEMATOCRIT: CPT

## 2021-01-01 PROCEDURE — 93005 ELECTROCARDIOGRAM TRACING: CPT | Performed by: STUDENT IN AN ORGANIZED HEALTH CARE EDUCATION/TRAINING PROGRAM

## 2021-01-01 PROCEDURE — 2060000000 HC ICU INTERMEDIATE R&B

## 2021-01-01 PROCEDURE — 6370000000 HC RX 637 (ALT 250 FOR IP): Performed by: UROLOGY

## 2021-01-01 PROCEDURE — 7100000011 HC PHASE II RECOVERY - ADDTL 15 MIN: Performed by: UROLOGY

## 2021-01-01 PROCEDURE — 94761 N-INVAS EAR/PLS OXIMETRY MLT: CPT

## 2021-01-01 PROCEDURE — 2500000003 HC RX 250 WO HCPCS: Performed by: NURSE ANESTHETIST, CERTIFIED REGISTERED

## 2021-01-01 PROCEDURE — 87040 BLOOD CULTURE FOR BACTERIA: CPT

## 2021-01-01 PROCEDURE — 80048 BASIC METABOLIC PNL TOTAL CA: CPT

## 2021-01-01 PROCEDURE — 6360000002 HC RX W HCPCS: Performed by: INTERNAL MEDICINE

## 2021-01-01 PROCEDURE — 88305 TISSUE EXAM BY PATHOLOGIST: CPT

## 2021-01-01 PROCEDURE — 82330 ASSAY OF CALCIUM: CPT

## 2021-01-01 PROCEDURE — 80074 ACUTE HEPATITIS PANEL: CPT

## 2021-01-01 PROCEDURE — 99223 1ST HOSP IP/OBS HIGH 75: CPT | Performed by: INTERNAL MEDICINE

## 2021-01-01 PROCEDURE — 71045 X-RAY EXAM CHEST 1 VIEW: CPT

## 2021-01-01 PROCEDURE — 83690 ASSAY OF LIPASE: CPT

## 2021-01-01 PROCEDURE — 2709999900 HC NON-CHARGEABLE SUPPLY: Performed by: UROLOGY

## 2021-01-01 PROCEDURE — 80053 COMPREHEN METABOLIC PANEL: CPT

## 2021-01-01 PROCEDURE — 99231 SBSQ HOSP IP/OBS SF/LOW 25: CPT | Performed by: INTERNAL MEDICINE

## 2021-01-01 PROCEDURE — 87449 NOS EACH ORGANISM AG IA: CPT

## 2021-01-01 PROCEDURE — 99233 SBSQ HOSP IP/OBS HIGH 50: CPT | Performed by: FAMILY MEDICINE

## 2021-01-01 PROCEDURE — 84155 ASSAY OF PROTEIN SERUM: CPT

## 2021-01-01 PROCEDURE — 6360000004 HC RX CONTRAST MEDICATION: Performed by: UROLOGY

## 2021-01-01 PROCEDURE — 87635 SARS-COV-2 COVID-19 AMP PRB: CPT

## 2021-01-01 PROCEDURE — U0003 INFECTIOUS AGENT DETECTION BY NUCLEIC ACID (DNA OR RNA); SEVERE ACUTE RESPIRATORY SYNDROME CORONAVIRUS 2 (SARS-COV-2) (CORONAVIRUS DISEASE [COVID-19]), AMPLIFIED PROBE TECHNIQUE, MAKING USE OF HIGH THROUGHPUT TECHNOLOGIES AS DESCRIBED BY CMS-2020-01-R: HCPCS

## 2021-01-01 PROCEDURE — U0005 INFEC AGEN DETEC AMPLI PROBE: HCPCS

## 2021-01-01 PROCEDURE — 3600000012 HC SURGERY LEVEL 2 ADDTL 15MIN: Performed by: UROLOGY

## 2021-01-01 PROCEDURE — 2500000003 HC RX 250 WO HCPCS: Performed by: STUDENT IN AN ORGANIZED HEALTH CARE EDUCATION/TRAINING PROGRAM

## 2021-01-01 PROCEDURE — 6360000002 HC RX W HCPCS: Performed by: NURSE ANESTHETIST, CERTIFIED REGISTERED

## 2021-01-01 PROCEDURE — 86038 ANTINUCLEAR ANTIBODIES: CPT

## 2021-01-01 PROCEDURE — 3600000002 HC SURGERY LEVEL 2 BASE: Performed by: UROLOGY

## 2021-01-01 PROCEDURE — 76770 US EXAM ABDO BACK WALL COMP: CPT

## 2021-01-01 PROCEDURE — 87086 URINE CULTURE/COLONY COUNT: CPT

## 2021-01-01 PROCEDURE — 86160 COMPLEMENT ANTIGEN: CPT

## 2021-01-01 PROCEDURE — 84484 ASSAY OF TROPONIN QUANT: CPT

## 2021-01-01 PROCEDURE — 82565 ASSAY OF CREATININE: CPT

## 2021-01-01 PROCEDURE — 6360000004 HC RX CONTRAST MEDICATION: Performed by: INTERNAL MEDICINE

## 2021-01-01 PROCEDURE — 99233 SBSQ HOSP IP/OBS HIGH 50: CPT | Performed by: INTERNAL MEDICINE

## 2021-01-01 PROCEDURE — 74018 RADEX ABDOMEN 1 VIEW: CPT

## 2021-01-01 PROCEDURE — 84145 PROCALCITONIN (PCT): CPT

## 2021-01-01 PROCEDURE — 3700000001 HC ADD 15 MINUTES (ANESTHESIA): Performed by: UROLOGY

## 2021-01-01 PROCEDURE — 84443 ASSAY THYROID STIM HORMONE: CPT

## 2021-01-01 PROCEDURE — 36600 WITHDRAWAL OF ARTERIAL BLOOD: CPT

## 2021-01-01 PROCEDURE — 99222 1ST HOSP IP/OBS MODERATE 55: CPT | Performed by: FAMILY MEDICINE

## 2021-01-01 PROCEDURE — 82805 BLOOD GASES W/O2 SATURATION: CPT

## 2021-01-01 PROCEDURE — 83516 IMMUNOASSAY NONANTIBODY: CPT

## 2021-01-01 RX ORDER — FENTANYL CITRATE 50 UG/ML
25 INJECTION, SOLUTION INTRAMUSCULAR; INTRAVENOUS EVERY 5 MIN PRN
Status: DISCONTINUED | OUTPATIENT
Start: 2021-01-01 | End: 2021-01-01 | Stop reason: HOSPADM

## 2021-01-01 RX ORDER — CIPROFLOXACIN 250 MG/1
250 TABLET, FILM COATED ORAL 2 TIMES DAILY
Qty: 8 TABLET | Refills: 0 | Status: SHIPPED | OUTPATIENT
Start: 2021-01-01 | End: 2021-01-01

## 2021-01-01 RX ORDER — EPHEDRINE SULFATE/0.9% NACL/PF 50 MG/5 ML
SYRINGE (ML) INTRAVENOUS PRN
Status: DISCONTINUED | OUTPATIENT
Start: 2021-01-01 | End: 2021-01-01 | Stop reason: SDUPTHER

## 2021-01-01 RX ORDER — FUROSEMIDE 10 MG/ML
20 INJECTION INTRAMUSCULAR; INTRAVENOUS ONCE
Status: COMPLETED | OUTPATIENT
Start: 2021-01-01 | End: 2021-01-01

## 2021-01-01 RX ORDER — GLYCOPYRROLATE 0.2 MG/ML
0.1 INJECTION INTRAMUSCULAR; INTRAVENOUS EVERY 4 HOURS PRN
Status: DISCONTINUED | OUTPATIENT
Start: 2021-01-01 | End: 2021-01-01 | Stop reason: HOSPADM

## 2021-01-01 RX ORDER — CEPHALEXIN 500 MG/1
500 CAPSULE ORAL 2 TIMES DAILY
Qty: 10 CAPSULE | Refills: 0 | Status: SHIPPED | OUTPATIENT
Start: 2021-01-01 | End: 2021-01-01

## 2021-01-01 RX ORDER — HEPARIN SODIUM 10000 [USP'U]/100ML
5-30 INJECTION, SOLUTION INTRAVENOUS CONTINUOUS
Status: DISCONTINUED | OUTPATIENT
Start: 2021-01-01 | End: 2021-01-01 | Stop reason: ALTCHOICE

## 2021-01-01 RX ORDER — HYDROMORPHONE HYDROCHLORIDE 1 MG/ML
0.25 INJECTION, SOLUTION INTRAMUSCULAR; INTRAVENOUS; SUBCUTANEOUS EVERY 5 MIN PRN
Status: DISCONTINUED | OUTPATIENT
Start: 2021-01-01 | End: 2021-01-01 | Stop reason: HOSPADM

## 2021-01-01 RX ORDER — LEVOTHYROXINE SODIUM 0.05 MG/1
TABLET ORAL
Qty: 30 TABLET | Refills: 0 | Status: SHIPPED | OUTPATIENT
Start: 2021-01-01

## 2021-01-01 RX ORDER — BUMETANIDE 0.25 MG/ML
2 INJECTION, SOLUTION INTRAMUSCULAR; INTRAVENOUS EVERY 12 HOURS
Status: DISCONTINUED | OUTPATIENT
Start: 2021-01-01 | End: 2021-01-01 | Stop reason: ALTCHOICE

## 2021-01-01 RX ORDER — MORPHINE SULFATE 100 MG/5ML
5 SOLUTION ORAL
Status: DISCONTINUED | OUTPATIENT
Start: 2021-01-01 | End: 2021-01-01

## 2021-01-01 RX ORDER — FUROSEMIDE 10 MG/ML
20 INJECTION INTRAMUSCULAR; INTRAVENOUS 2 TIMES DAILY
Status: DISCONTINUED | OUTPATIENT
Start: 2021-01-01 | End: 2021-01-01

## 2021-01-01 RX ORDER — CITALOPRAM 20 MG/1
TABLET ORAL
Qty: 90 TABLET | Refills: 0 | Status: SHIPPED | OUTPATIENT
Start: 2021-01-01 | End: 2021-01-01

## 2021-01-01 RX ORDER — SODIUM CHLORIDE 9 MG/ML
25 INJECTION, SOLUTION INTRAVENOUS PRN
Status: DISCONTINUED | OUTPATIENT
Start: 2021-01-01 | End: 2021-01-01 | Stop reason: ALTCHOICE

## 2021-01-01 RX ORDER — LIDOCAINE HYDROCHLORIDE 20 MG/ML
JELLY TOPICAL PRN
Status: DISCONTINUED | OUTPATIENT
Start: 2021-01-01 | End: 2021-01-01 | Stop reason: ALTCHOICE

## 2021-01-01 RX ORDER — HYDRALAZINE HYDROCHLORIDE 20 MG/ML
5 INJECTION INTRAMUSCULAR; INTRAVENOUS EVERY 10 MIN PRN
Status: DISCONTINUED | OUTPATIENT
Start: 2021-01-01 | End: 2021-01-01 | Stop reason: HOSPADM

## 2021-01-01 RX ORDER — HYDROCODONE BITARTRATE AND ACETAMINOPHEN 5; 325 MG/1; MG/1
1 TABLET ORAL EVERY 6 HOURS PRN
Qty: 20 TABLET | Refills: 0 | Status: SHIPPED | OUTPATIENT
Start: 2021-01-01 | End: 2021-01-01

## 2021-01-01 RX ORDER — MEPERIDINE HYDROCHLORIDE 50 MG/ML
12.5 INJECTION INTRAMUSCULAR; INTRAVENOUS; SUBCUTANEOUS EVERY 5 MIN PRN
Status: DISCONTINUED | OUTPATIENT
Start: 2021-01-01 | End: 2021-01-01 | Stop reason: HOSPADM

## 2021-01-01 RX ORDER — FENTANYL CITRATE 50 UG/ML
INJECTION, SOLUTION INTRAMUSCULAR; INTRAVENOUS PRN
Status: DISCONTINUED | OUTPATIENT
Start: 2021-01-01 | End: 2021-01-01 | Stop reason: SDUPTHER

## 2021-01-01 RX ORDER — PHENAZOPYRIDINE HYDROCHLORIDE 100 MG/1
100 TABLET, FILM COATED ORAL DAILY
Qty: 7 TABLET | Refills: 1 | Status: SHIPPED | OUTPATIENT
Start: 2021-01-01 | End: 2021-01-01

## 2021-01-01 RX ORDER — FENTANYL CITRATE 50 UG/ML
50 INJECTION, SOLUTION INTRAMUSCULAR; INTRAVENOUS EVERY 5 MIN PRN
Status: DISCONTINUED | OUTPATIENT
Start: 2021-01-01 | End: 2021-01-01 | Stop reason: HOSPADM

## 2021-01-01 RX ORDER — CITALOPRAM 20 MG/1
TABLET ORAL
Qty: 30 TABLET | Refills: 0 | OUTPATIENT
Start: 2021-01-01

## 2021-01-01 RX ORDER — ACETAMINOPHEN 325 MG/1
650 TABLET ORAL EVERY 6 HOURS PRN
Status: DISCONTINUED | OUTPATIENT
Start: 2021-01-01 | End: 2021-01-01 | Stop reason: HOSPADM

## 2021-01-01 RX ORDER — ROCURONIUM BROMIDE 10 MG/ML
INJECTION, SOLUTION INTRAVENOUS PRN
Status: DISCONTINUED | OUTPATIENT
Start: 2021-01-01 | End: 2021-01-01 | Stop reason: SDUPTHER

## 2021-01-01 RX ORDER — LOSARTAN POTASSIUM 25 MG/1
25 TABLET ORAL DAILY
Status: DISCONTINUED | OUTPATIENT
Start: 2021-01-01 | End: 2021-01-01

## 2021-01-01 RX ORDER — SODIUM CHLORIDE 9 MG/ML
INJECTION, SOLUTION INTRAVENOUS CONTINUOUS
Status: DISCONTINUED | OUTPATIENT
Start: 2021-01-01 | End: 2021-01-01 | Stop reason: HOSPADM

## 2021-01-01 RX ORDER — SPIRONOLACTONE 25 MG/1
25 TABLET ORAL DAILY
Status: DISCONTINUED | OUTPATIENT
Start: 2021-01-01 | End: 2021-01-01 | Stop reason: ALTCHOICE

## 2021-01-01 RX ORDER — LEVOTHYROXINE SODIUM 0.05 MG/1
TABLET ORAL
Qty: 30 TABLET | Refills: 0 | OUTPATIENT
Start: 2021-01-01

## 2021-01-01 RX ORDER — HYDROMORPHONE HYDROCHLORIDE 1 MG/ML
0.5 INJECTION, SOLUTION INTRAMUSCULAR; INTRAVENOUS; SUBCUTANEOUS EVERY 5 MIN PRN
Status: DISCONTINUED | OUTPATIENT
Start: 2021-01-01 | End: 2021-01-01 | Stop reason: HOSPADM

## 2021-01-01 RX ORDER — POTASSIUM CHLORIDE 7.45 MG/ML
40 INJECTION INTRAVENOUS ONCE
Status: COMPLETED | OUTPATIENT
Start: 2021-01-01 | End: 2021-01-01

## 2021-01-01 RX ORDER — LEVOTHYROXINE SODIUM 0.05 MG/1
50 TABLET ORAL DAILY
Status: DISCONTINUED | OUTPATIENT
Start: 2021-01-01 | End: 2021-01-01 | Stop reason: ALTCHOICE

## 2021-01-01 RX ORDER — HALOPERIDOL 5 MG/ML
1 INJECTION INTRAMUSCULAR EVERY 4 HOURS PRN
Status: DISCONTINUED | OUTPATIENT
Start: 2021-01-01 | End: 2021-01-01 | Stop reason: HOSPADM

## 2021-01-01 RX ORDER — HEPARIN SODIUM 1000 [USP'U]/ML
4000 INJECTION, SOLUTION INTRAVENOUS; SUBCUTANEOUS ONCE
Status: COMPLETED | OUTPATIENT
Start: 2021-01-01 | End: 2021-01-01

## 2021-01-01 RX ORDER — DIPHENHYDRAMINE HYDROCHLORIDE 50 MG/ML
12.5 INJECTION INTRAMUSCULAR; INTRAVENOUS
Status: DISCONTINUED | OUTPATIENT
Start: 2021-01-01 | End: 2021-01-01 | Stop reason: HOSPADM

## 2021-01-01 RX ORDER — OLANZAPINE 5 MG/1
5 TABLET ORAL NIGHTLY
Status: DISCONTINUED | OUTPATIENT
Start: 2021-01-01 | End: 2021-01-01 | Stop reason: ALTCHOICE

## 2021-01-01 RX ORDER — ZIPRASIDONE MESYLATE 20 MG/ML
10 INJECTION, POWDER, LYOPHILIZED, FOR SOLUTION INTRAMUSCULAR ONCE
Status: COMPLETED | OUTPATIENT
Start: 2021-01-01 | End: 2021-01-01

## 2021-01-01 RX ORDER — SODIUM CHLORIDE, SODIUM LACTATE, POTASSIUM CHLORIDE, CALCIUM CHLORIDE 600; 310; 30; 20 MG/100ML; MG/100ML; MG/100ML; MG/100ML
INJECTION, SOLUTION INTRAVENOUS CONTINUOUS
Status: DISCONTINUED | OUTPATIENT
Start: 2021-01-01 | End: 2021-01-01 | Stop reason: HOSPADM

## 2021-01-01 RX ORDER — CITALOPRAM 20 MG/1
TABLET ORAL
Qty: 90 TABLET | Refills: 0 | Status: SHIPPED | OUTPATIENT
Start: 2021-01-01

## 2021-01-01 RX ORDER — FENTANYL CITRATE 50 UG/ML
25 INJECTION, SOLUTION INTRAMUSCULAR; INTRAVENOUS
Status: DISCONTINUED | OUTPATIENT
Start: 2021-01-01 | End: 2021-01-01

## 2021-01-01 RX ORDER — PROMETHAZINE HYDROCHLORIDE 25 MG/ML
6.25 INJECTION, SOLUTION INTRAMUSCULAR; INTRAVENOUS
Status: DISCONTINUED | OUTPATIENT
Start: 2021-01-01 | End: 2021-01-01 | Stop reason: HOSPADM

## 2021-01-01 RX ORDER — METOCLOPRAMIDE HYDROCHLORIDE 5 MG/ML
10 INJECTION INTRAMUSCULAR; INTRAVENOUS
Status: DISCONTINUED | OUTPATIENT
Start: 2021-01-01 | End: 2021-01-01 | Stop reason: HOSPADM

## 2021-01-01 RX ORDER — SODIUM CHLORIDE 0.9 % (FLUSH) 0.9 %
5-40 SYRINGE (ML) INJECTION PRN
Status: DISCONTINUED | OUTPATIENT
Start: 2021-01-01 | End: 2021-01-01 | Stop reason: HOSPADM

## 2021-01-01 RX ORDER — PHENYLEPHRINE HCL IN 0.9% NACL 1 MG/10 ML
SYRINGE (ML) INTRAVENOUS PRN
Status: DISCONTINUED | OUTPATIENT
Start: 2021-01-01 | End: 2021-01-01 | Stop reason: SDUPTHER

## 2021-01-01 RX ORDER — LIDOCAINE HYDROCHLORIDE 10 MG/ML
1 INJECTION, SOLUTION EPIDURAL; INFILTRATION; INTRACAUDAL; PERINEURAL
Status: DISCONTINUED | OUTPATIENT
Start: 2021-01-01 | End: 2021-01-01 | Stop reason: HOSPADM

## 2021-01-01 RX ORDER — MAGNESIUM SULFATE IN WATER 40 MG/ML
2000 INJECTION, SOLUTION INTRAVENOUS ONCE
Status: COMPLETED | OUTPATIENT
Start: 2021-01-01 | End: 2021-01-01

## 2021-01-01 RX ORDER — SODIUM CHLORIDE 9 MG/ML
25 INJECTION, SOLUTION INTRAVENOUS PRN
Status: DISCONTINUED | OUTPATIENT
Start: 2021-01-01 | End: 2021-01-01 | Stop reason: HOSPADM

## 2021-01-01 RX ORDER — LEVOTHYROXINE SODIUM 0.05 MG/1
TABLET ORAL
Qty: 90 TABLET | Refills: 0 | OUTPATIENT
Start: 2021-01-01

## 2021-01-01 RX ORDER — POTASSIUM CHLORIDE 20 MEQ/1
40 TABLET, EXTENDED RELEASE ORAL ONCE
Status: COMPLETED | OUTPATIENT
Start: 2021-01-01 | End: 2021-01-01

## 2021-01-01 RX ORDER — PREDNISONE 1 MG/1
3 TABLET ORAL DAILY
Status: DISCONTINUED | OUTPATIENT
Start: 2021-01-01 | End: 2021-01-01 | Stop reason: ALTCHOICE

## 2021-01-01 RX ORDER — LORAZEPAM 2 MG/ML
1 CONCENTRATE ORAL
Status: DISCONTINUED | OUTPATIENT
Start: 2021-01-01 | End: 2021-01-01

## 2021-01-01 RX ORDER — ASPIRIN 81 MG/1
324 TABLET, CHEWABLE ORAL ONCE
Status: COMPLETED | OUTPATIENT
Start: 2021-01-01 | End: 2021-01-01

## 2021-01-01 RX ORDER — SODIUM CHLORIDE 0.9 % (FLUSH) 0.9 %
10 SYRINGE (ML) INJECTION EVERY 12 HOURS SCHEDULED
Status: DISCONTINUED | OUTPATIENT
Start: 2021-01-01 | End: 2021-01-01 | Stop reason: HOSPADM

## 2021-01-01 RX ORDER — CEFAZOLIN SODIUM 1 G/3ML
INJECTION, POWDER, FOR SOLUTION INTRAMUSCULAR; INTRAVENOUS PRN
Status: DISCONTINUED | OUTPATIENT
Start: 2021-01-01 | End: 2021-01-01 | Stop reason: SDUPTHER

## 2021-01-01 RX ORDER — HEPARIN SODIUM 1000 [USP'U]/ML
4000 INJECTION, SOLUTION INTRAVENOUS; SUBCUTANEOUS PRN
Status: DISCONTINUED | OUTPATIENT
Start: 2021-01-01 | End: 2021-01-01 | Stop reason: ALTCHOICE

## 2021-01-01 RX ORDER — SODIUM CHLORIDE 0.9 % (FLUSH) 0.9 %
10 SYRINGE (ML) INJECTION PRN
Status: DISCONTINUED | OUTPATIENT
Start: 2021-01-01 | End: 2021-01-01 | Stop reason: HOSPADM

## 2021-01-01 RX ORDER — HEPARIN SODIUM 1000 [USP'U]/ML
2000 INJECTION, SOLUTION INTRAVENOUS; SUBCUTANEOUS PRN
Status: DISCONTINUED | OUTPATIENT
Start: 2021-01-01 | End: 2021-01-01 | Stop reason: ALTCHOICE

## 2021-01-01 RX ORDER — ATORVASTATIN CALCIUM 40 MG/1
40 TABLET, FILM COATED ORAL NIGHTLY
Status: DISCONTINUED | OUTPATIENT
Start: 2021-01-01 | End: 2021-01-01 | Stop reason: HOSPADM

## 2021-01-01 RX ORDER — PROPOFOL 10 MG/ML
INJECTION, EMULSION INTRAVENOUS PRN
Status: DISCONTINUED | OUTPATIENT
Start: 2021-01-01 | End: 2021-01-01 | Stop reason: SDUPTHER

## 2021-01-01 RX ORDER — FENTANYL CITRATE 50 UG/ML
25 INJECTION, SOLUTION INTRAMUSCULAR; INTRAVENOUS EVERY 30 MIN PRN
Status: DISCONTINUED | OUTPATIENT
Start: 2021-01-01 | End: 2021-01-01 | Stop reason: HOSPADM

## 2021-01-01 RX ORDER — LORAZEPAM 2 MG/ML
0.5 INJECTION INTRAMUSCULAR
Status: DISCONTINUED | OUTPATIENT
Start: 2021-01-01 | End: 2021-01-01 | Stop reason: HOSPADM

## 2021-01-01 RX ORDER — FENTANYL CITRATE 50 UG/ML
100 INJECTION, SOLUTION INTRAMUSCULAR; INTRAVENOUS EVERY 30 MIN PRN
Status: DISCONTINUED | OUTPATIENT
Start: 2021-01-01 | End: 2021-01-01 | Stop reason: HOSPADM

## 2021-01-01 RX ORDER — ACETAMINOPHEN 650 MG/1
650 SUPPOSITORY RECTAL EVERY 6 HOURS PRN
Status: DISCONTINUED | OUTPATIENT
Start: 2021-01-01 | End: 2021-01-01 | Stop reason: HOSPADM

## 2021-01-01 RX ORDER — FENTANYL CITRATE 50 UG/ML
100 INJECTION, SOLUTION INTRAMUSCULAR; INTRAVENOUS
Status: DISCONTINUED | OUTPATIENT
Start: 2021-01-01 | End: 2021-01-01

## 2021-01-01 RX ORDER — ONDANSETRON 4 MG/1
4 TABLET, ORALLY DISINTEGRATING ORAL EVERY 8 HOURS PRN
Status: DISCONTINUED | OUTPATIENT
Start: 2021-01-01 | End: 2021-01-01 | Stop reason: HOSPADM

## 2021-01-01 RX ORDER — BUMETANIDE 0.25 MG/ML
2 INJECTION, SOLUTION INTRAMUSCULAR; INTRAVENOUS ONCE
Status: COMPLETED | OUTPATIENT
Start: 2021-01-01 | End: 2021-01-01

## 2021-01-01 RX ORDER — DEXAMETHASONE SODIUM PHOSPHATE 10 MG/ML
INJECTION, SOLUTION INTRAMUSCULAR; INTRAVENOUS PRN
Status: DISCONTINUED | OUTPATIENT
Start: 2021-01-01 | End: 2021-01-01 | Stop reason: SDUPTHER

## 2021-01-01 RX ORDER — PROPOFOL 10 MG/ML
INJECTION, EMULSION INTRAVENOUS CONTINUOUS PRN
Status: DISCONTINUED | OUTPATIENT
Start: 2021-01-01 | End: 2021-01-01 | Stop reason: SDUPTHER

## 2021-01-01 RX ORDER — POLYETHYLENE GLYCOL 3350 17 G/17G
17 POWDER, FOR SOLUTION ORAL DAILY PRN
Status: DISCONTINUED | OUTPATIENT
Start: 2021-01-01 | End: 2021-01-01 | Stop reason: HOSPADM

## 2021-01-01 RX ORDER — METHYLPREDNISOLONE SODIUM SUCCINATE 40 MG/ML
40 INJECTION, POWDER, LYOPHILIZED, FOR SOLUTION INTRAMUSCULAR; INTRAVENOUS EVERY 12 HOURS
Status: DISCONTINUED | OUTPATIENT
Start: 2021-01-01 | End: 2021-01-01 | Stop reason: ALTCHOICE

## 2021-01-01 RX ORDER — ONDANSETRON 2 MG/ML
INJECTION INTRAMUSCULAR; INTRAVENOUS PRN
Status: DISCONTINUED | OUTPATIENT
Start: 2021-01-01 | End: 2021-01-01 | Stop reason: SDUPTHER

## 2021-01-01 RX ORDER — ONDANSETRON 2 MG/ML
4 INJECTION INTRAMUSCULAR; INTRAVENOUS EVERY 6 HOURS PRN
Status: DISCONTINUED | OUTPATIENT
Start: 2021-01-01 | End: 2021-01-01 | Stop reason: HOSPADM

## 2021-01-01 RX ORDER — FENTANYL CITRATE 50 UG/ML
50 INJECTION, SOLUTION INTRAMUSCULAR; INTRAVENOUS EVERY 30 MIN PRN
Status: DISCONTINUED | OUTPATIENT
Start: 2021-01-01 | End: 2021-01-01 | Stop reason: HOSPADM

## 2021-01-01 RX ORDER — HYDROCODONE BITARTRATE AND ACETAMINOPHEN 5; 325 MG/1; MG/1
1 TABLET ORAL
Status: DISCONTINUED | OUTPATIENT
Start: 2021-01-01 | End: 2021-01-01 | Stop reason: HOSPADM

## 2021-01-01 RX ORDER — ASPIRIN 81 MG/1
81 TABLET, CHEWABLE ORAL DAILY
Status: DISCONTINUED | OUTPATIENT
Start: 2021-01-01 | End: 2021-01-01 | Stop reason: ALTCHOICE

## 2021-01-01 RX ORDER — LORAZEPAM 2 MG/ML
1 INJECTION INTRAMUSCULAR
Status: DISCONTINUED | OUTPATIENT
Start: 2021-01-01 | End: 2021-01-01 | Stop reason: HOSPADM

## 2021-01-01 RX ORDER — SODIUM CHLORIDE 0.9 % (FLUSH) 0.9 %
5-40 SYRINGE (ML) INJECTION EVERY 12 HOURS SCHEDULED
Status: DISCONTINUED | OUTPATIENT
Start: 2021-01-01 | End: 2021-01-01 | Stop reason: ALTCHOICE

## 2021-01-01 RX ORDER — LIDOCAINE HYDROCHLORIDE 20 MG/ML
INJECTION, SOLUTION EPIDURAL; INFILTRATION; INTRACAUDAL; PERINEURAL PRN
Status: DISCONTINUED | OUTPATIENT
Start: 2021-01-01 | End: 2021-01-01 | Stop reason: SDUPTHER

## 2021-01-01 RX ORDER — FENTANYL CITRATE 50 UG/ML
50 INJECTION, SOLUTION INTRAMUSCULAR; INTRAVENOUS
Status: DISCONTINUED | OUTPATIENT
Start: 2021-01-01 | End: 2021-01-01

## 2021-01-01 RX ORDER — ONDANSETRON 2 MG/ML
4 INJECTION INTRAMUSCULAR; INTRAVENOUS
Status: DISCONTINUED | OUTPATIENT
Start: 2021-01-01 | End: 2021-01-01 | Stop reason: HOSPADM

## 2021-01-01 RX ADMIN — FUROSEMIDE 20 MG: 10 INJECTION, SOLUTION INTRAMUSCULAR; INTRAVENOUS at 08:29

## 2021-01-01 RX ADMIN — POTASSIUM CHLORIDE 40 MEQ: 7.46 INJECTION, SOLUTION INTRAVENOUS at 09:59

## 2021-01-01 RX ADMIN — MAGNESIUM SULFATE 2000 MG: 2 INJECTION INTRAVENOUS at 02:36

## 2021-01-01 RX ADMIN — FENTANYL CITRATE 50 MCG: 50 INJECTION, SOLUTION INTRAMUSCULAR; INTRAVENOUS at 13:58

## 2021-01-01 RX ADMIN — OLANZAPINE 5 MG: 5 TABLET, FILM COATED ORAL at 01:10

## 2021-01-01 RX ADMIN — DESMOPRESSIN ACETATE 40 MG: 0.2 TABLET ORAL at 20:08

## 2021-01-01 RX ADMIN — HEPARIN SODIUM AND DEXTROSE 20 UNITS/KG/HR: 10000; 5 INJECTION INTRAVENOUS at 17:58

## 2021-01-01 RX ADMIN — LEVOTHYROXINE SODIUM 50 MCG: 50 TABLET ORAL at 08:58

## 2021-01-01 RX ADMIN — Medication 10 MG: at 09:22

## 2021-01-01 RX ADMIN — HEPARIN SODIUM AND DEXTROSE 12 UNITS/KG/HR: 10000; 5 INJECTION INTRAVENOUS at 11:17

## 2021-01-01 RX ADMIN — CEFTRIAXONE SODIUM 1000 MG: 1 INJECTION, POWDER, FOR SOLUTION INTRAMUSCULAR; INTRAVENOUS at 09:50

## 2021-01-01 RX ADMIN — SODIUM CHLORIDE, PRESERVATIVE FREE 10 ML: 5 INJECTION INTRAVENOUS at 08:58

## 2021-01-01 RX ADMIN — PROPOFOL 150 MCG/KG/MIN: 10 INJECTION, EMULSION INTRAVENOUS at 07:59

## 2021-01-01 RX ADMIN — OLANZAPINE 5 MG: 5 TABLET, FILM COATED ORAL at 21:42

## 2021-01-01 RX ADMIN — LIDOCAINE HYDROCHLORIDE 100 MG: 20 INJECTION, SOLUTION EPIDURAL; INFILTRATION; INTRACAUDAL; PERINEURAL at 08:56

## 2021-01-01 RX ADMIN — METHYLPREDNISOLONE SODIUM SUCCINATE 40 MG: 40 INJECTION, POWDER, FOR SOLUTION INTRAMUSCULAR; INTRAVENOUS at 15:02

## 2021-01-01 RX ADMIN — SODIUM CHLORIDE, PRESERVATIVE FREE 10 ML: 5 INJECTION INTRAVENOUS at 08:31

## 2021-01-01 RX ADMIN — SUGAMMADEX 200 MG: 100 INJECTION, SOLUTION INTRAVENOUS at 09:48

## 2021-01-01 RX ADMIN — METOPROLOL TARTRATE 12.5 MG: 25 TABLET ORAL at 07:56

## 2021-01-01 RX ADMIN — FENTANYL CITRATE 75 MCG: 50 INJECTION, SOLUTION INTRAMUSCULAR; INTRAVENOUS at 08:54

## 2021-01-01 RX ADMIN — CEFAZOLIN 2000 MG: 1 INJECTION, POWDER, FOR SOLUTION INTRAMUSCULAR; INTRAVENOUS at 08:02

## 2021-01-01 RX ADMIN — ZIPRASIDONE MESYLATE 10 MG: 20 INJECTION, POWDER, LYOPHILIZED, FOR SOLUTION INTRAMUSCULAR at 02:36

## 2021-01-01 RX ADMIN — HEPARIN SODIUM 2000 UNITS: 1000 INJECTION INTRAVENOUS; SUBCUTANEOUS at 06:39

## 2021-01-01 RX ADMIN — DESMOPRESSIN ACETATE 40 MG: 0.2 TABLET ORAL at 02:52

## 2021-01-01 RX ADMIN — DESMOPRESSIN ACETATE 40 MG: 0.2 TABLET ORAL at 20:20

## 2021-01-01 RX ADMIN — AZITHROMYCIN MONOHYDRATE 250 MG: 500 INJECTION, POWDER, LYOPHILIZED, FOR SOLUTION INTRAVENOUS at 06:39

## 2021-01-01 RX ADMIN — Medication 25 MCG: at 08:13

## 2021-01-01 RX ADMIN — LORAZEPAM 0.5 MG: 2 INJECTION INTRAMUSCULAR; INTRAVENOUS at 13:35

## 2021-01-01 RX ADMIN — HEPARIN SODIUM 2000 UNITS: 1000 INJECTION INTRAVENOUS; SUBCUTANEOUS at 20:21

## 2021-01-01 RX ADMIN — SODIUM CHLORIDE, POTASSIUM CHLORIDE, SODIUM LACTATE AND CALCIUM CHLORIDE: 600; 310; 30; 20 INJECTION, SOLUTION INTRAVENOUS at 07:09

## 2021-01-01 RX ADMIN — Medication 25 MCG: at 08:01

## 2021-01-01 RX ADMIN — POTASSIUM CHLORIDE 40 MEQ: 1500 TABLET, EXTENDED RELEASE ORAL at 01:43

## 2021-01-01 RX ADMIN — AZITHROMYCIN MONOHYDRATE 250 MG: 500 INJECTION, POWDER, LYOPHILIZED, FOR SOLUTION INTRAVENOUS at 08:29

## 2021-01-01 RX ADMIN — BUMETANIDE 2 MG: 0.25 INJECTION, SOLUTION INTRAMUSCULAR; INTRAVENOUS at 15:35

## 2021-01-01 RX ADMIN — OLANZAPINE 5 MG: 5 TABLET, FILM COATED ORAL at 20:08

## 2021-01-01 RX ADMIN — BUMETANIDE 2 MG: 0.25 INJECTION, SOLUTION INTRAMUSCULAR; INTRAVENOUS at 00:14

## 2021-01-01 RX ADMIN — HEPARIN SODIUM AND DEXTROSE 18 UNITS/KG/HR: 10000; 5 INJECTION INTRAVENOUS at 11:00

## 2021-01-01 RX ADMIN — HEPARIN SODIUM 4000 UNITS: 1000 INJECTION INTRAVENOUS; SUBCUTANEOUS at 11:18

## 2021-01-01 RX ADMIN — IOPAMIDOL 75 ML: 755 INJECTION, SOLUTION INTRAVENOUS at 10:48

## 2021-01-01 RX ADMIN — ASPIRIN 81 MG: 81 TABLET, CHEWABLE ORAL at 08:04

## 2021-01-01 RX ADMIN — ROCURONIUM BROMIDE 40 MG: 10 INJECTION, SOLUTION INTRAVENOUS at 08:57

## 2021-01-01 RX ADMIN — METHYLPREDNISOLONE SODIUM SUCCINATE 40 MG: 40 INJECTION, POWDER, FOR SOLUTION INTRAMUSCULAR; INTRAVENOUS at 04:19

## 2021-01-01 RX ADMIN — METOPROLOL TARTRATE 12.5 MG: 25 TABLET ORAL at 08:58

## 2021-01-01 RX ADMIN — DESMOPRESSIN ACETATE 40 MG: 0.2 TABLET ORAL at 21:42

## 2021-01-01 RX ADMIN — LEVOTHYROXINE SODIUM 50 MCG: 50 TABLET ORAL at 08:04

## 2021-01-01 RX ADMIN — SPIRONOLACTONE 25 MG: 25 TABLET ORAL at 07:56

## 2021-01-01 RX ADMIN — FUROSEMIDE 20 MG: 10 INJECTION, SOLUTION INTRAMUSCULAR; INTRAVENOUS at 18:34

## 2021-01-01 RX ADMIN — Medication 15 MG: at 09:01

## 2021-01-01 RX ADMIN — CEFTRIAXONE SODIUM 1000 MG: 1 INJECTION, POWDER, FOR SOLUTION INTRAMUSCULAR; INTRAVENOUS at 11:45

## 2021-01-01 RX ADMIN — AZITHROMYCIN MONOHYDRATE 250 MG: 500 INJECTION, POWDER, LYOPHILIZED, FOR SOLUTION INTRAVENOUS at 06:43

## 2021-01-01 RX ADMIN — SODIUM CHLORIDE, POTASSIUM CHLORIDE, SODIUM LACTATE AND CALCIUM CHLORIDE: 600; 310; 30; 20 INJECTION, SOLUTION INTRAVENOUS at 07:32

## 2021-01-01 RX ADMIN — LEVOTHYROXINE SODIUM 50 MCG: 50 TABLET ORAL at 07:57

## 2021-01-01 RX ADMIN — METOPROLOL TARTRATE 12.5 MG: 25 TABLET ORAL at 08:04

## 2021-01-01 RX ADMIN — CEFAZOLIN 2000 MG: 10 INJECTION, POWDER, FOR SOLUTION INTRAVENOUS at 09:02

## 2021-01-01 RX ADMIN — BUMETANIDE 2 MG: 0.25 INJECTION, SOLUTION INTRAMUSCULAR; INTRAVENOUS at 01:28

## 2021-01-01 RX ADMIN — ACETAMINOPHEN 650 MG: 325 TABLET ORAL at 16:41

## 2021-01-01 RX ADMIN — METHYLPREDNISOLONE SODIUM SUCCINATE 40 MG: 40 INJECTION, POWDER, FOR SOLUTION INTRAMUSCULAR; INTRAVENOUS at 03:47

## 2021-01-01 RX ADMIN — Medication 50 MCG: at 08:19

## 2021-01-01 RX ADMIN — Medication 500 MG: at 12:53

## 2021-01-01 RX ADMIN — LEVOTHYROXINE SODIUM 50 MCG: 50 TABLET ORAL at 08:31

## 2021-01-01 RX ADMIN — FENTANYL CITRATE 50 MCG: 50 INJECTION, SOLUTION INTRAMUSCULAR; INTRAVENOUS at 09:34

## 2021-01-01 RX ADMIN — SODIUM CHLORIDE, PRESERVATIVE FREE 10 ML: 5 INJECTION INTRAVENOUS at 20:20

## 2021-01-01 RX ADMIN — HEPARIN SODIUM AND DEXTROSE 20 UNITS/KG/HR: 10000; 5 INJECTION INTRAVENOUS at 19:33

## 2021-01-01 RX ADMIN — BUMETANIDE 2 MG: 0.25 INJECTION, SOLUTION INTRAMUSCULAR; INTRAVENOUS at 13:45

## 2021-01-01 RX ADMIN — METHYLPREDNISOLONE SODIUM SUCCINATE 40 MG: 40 INJECTION, POWDER, FOR SOLUTION INTRAMUSCULAR; INTRAVENOUS at 04:02

## 2021-01-01 RX ADMIN — ASPIRIN 81 MG: 81 TABLET, CHEWABLE ORAL at 08:58

## 2021-01-01 RX ADMIN — CEFTRIAXONE SODIUM 1000 MG: 1 INJECTION, POWDER, FOR SOLUTION INTRAMUSCULAR; INTRAVENOUS at 07:57

## 2021-01-01 RX ADMIN — Medication 15 MG: at 09:05

## 2021-01-01 RX ADMIN — PREDNISONE 3 MG: 1 TABLET ORAL at 08:04

## 2021-01-01 RX ADMIN — FENTANYL CITRATE 50 MCG: 50 INJECTION, SOLUTION INTRAMUSCULAR; INTRAVENOUS at 13:00

## 2021-01-01 RX ADMIN — METHYLPREDNISOLONE SODIUM SUCCINATE 40 MG: 40 INJECTION, POWDER, FOR SOLUTION INTRAMUSCULAR; INTRAVENOUS at 16:24

## 2021-01-01 RX ADMIN — ASPIRIN 81 MG: 81 TABLET, CHEWABLE ORAL at 08:30

## 2021-01-01 RX ADMIN — HEPARIN SODIUM 2000 UNITS: 1000 INJECTION INTRAVENOUS; SUBCUTANEOUS at 23:24

## 2021-01-01 RX ADMIN — PREDNISONE 3 MG: 1 TABLET ORAL at 08:30

## 2021-01-01 RX ADMIN — DEXAMETHASONE SODIUM PHOSPHATE 10 MG: 10 INJECTION INTRAMUSCULAR; INTRAVENOUS at 09:02

## 2021-01-01 RX ADMIN — METOPROLOL TARTRATE 12.5 MG: 25 TABLET ORAL at 20:08

## 2021-01-01 RX ADMIN — METOPROLOL TARTRATE 12.5 MG: 25 TABLET ORAL at 21:40

## 2021-01-01 RX ADMIN — AZITHROMYCIN MONOHYDRATE 250 MG: 500 INJECTION, POWDER, LYOPHILIZED, FOR SOLUTION INTRAVENOUS at 07:57

## 2021-01-01 RX ADMIN — CEFTRIAXONE SODIUM 1000 MG: 1 INJECTION, POWDER, FOR SOLUTION INTRAMUSCULAR; INTRAVENOUS at 09:48

## 2021-01-01 RX ADMIN — LORAZEPAM 0.5 MG: 2 INJECTION INTRAMUSCULAR; INTRAVENOUS at 12:35

## 2021-01-01 RX ADMIN — SODIUM CHLORIDE, PRESERVATIVE FREE 10 ML: 5 INJECTION INTRAVENOUS at 08:04

## 2021-01-01 RX ADMIN — PROPOFOL 120 MG: 10 INJECTION, EMULSION INTRAVENOUS at 08:56

## 2021-01-01 RX ADMIN — Medication 10 MG: at 09:13

## 2021-01-01 RX ADMIN — SPIRONOLACTONE 25 MG: 25 TABLET ORAL at 15:35

## 2021-01-01 RX ADMIN — ONDANSETRON 4 MG: 2 INJECTION, SOLUTION INTRAMUSCULAR; INTRAVENOUS at 09:02

## 2021-01-01 RX ADMIN — METHYLPREDNISOLONE SODIUM SUCCINATE 40 MG: 40 INJECTION, POWDER, FOR SOLUTION INTRAMUSCULAR; INTRAVENOUS at 16:21

## 2021-01-01 RX ADMIN — FENTANYL CITRATE 50 MCG: 50 INJECTION, SOLUTION INTRAMUSCULAR; INTRAVENOUS at 09:30

## 2021-01-01 RX ADMIN — ASPIRIN 81 MG: 81 TABLET, CHEWABLE ORAL at 07:57

## 2021-01-01 RX ADMIN — Medication 25 MCG: at 08:05

## 2021-01-01 RX ADMIN — FUROSEMIDE 20 MG: 10 INJECTION, SOLUTION INTRAMUSCULAR; INTRAVENOUS at 11:19

## 2021-01-01 RX ADMIN — BUMETANIDE 2 MG: 0.25 INJECTION, SOLUTION INTRAMUSCULAR; INTRAVENOUS at 11:56

## 2021-01-01 RX ADMIN — CEFTRIAXONE SODIUM 1000 MG: 1 INJECTION, POWDER, FOR SOLUTION INTRAMUSCULAR; INTRAVENOUS at 08:10

## 2021-01-01 RX ADMIN — ASPIRIN 324 MG: 81 TABLET, CHEWABLE ORAL at 10:11

## 2021-01-01 RX ADMIN — SODIUM CHLORIDE, PRESERVATIVE FREE 10 ML: 5 INJECTION INTRAVENOUS at 07:57

## 2021-01-01 RX ADMIN — FENTANYL CITRATE 75 MCG: 50 INJECTION, SOLUTION INTRAMUSCULAR; INTRAVENOUS at 09:11

## 2021-01-01 RX ADMIN — WATER 1.2 ML: 1 INJECTION INTRAMUSCULAR; INTRAVENOUS; SUBCUTANEOUS at 02:39

## 2021-01-01 RX ADMIN — METOPROLOL TARTRATE 12.5 MG: 25 TABLET ORAL at 20:20

## 2021-01-01 ASSESSMENT — PAIN SCALES - GENERAL
PAINLEVEL_OUTOF10: 3
PAINLEVEL_OUTOF10: 0
PAINLEVEL_OUTOF10: 0
PAINLEVEL_OUTOF10: 3
PAINLEVEL_OUTOF10: 0
PAINLEVEL_OUTOF10: 3
PAINLEVEL_OUTOF10: 0
PAINLEVEL_OUTOF10: 1

## 2021-01-01 ASSESSMENT — PAIN DESCRIPTION - PAIN TYPE: TYPE: ACUTE PAIN

## 2021-01-01 ASSESSMENT — PULMONARY FUNCTION TESTS
PIF_VALUE: 15
PIF_VALUE: 0
PIF_VALUE: 16
PIF_VALUE: 1
PIF_VALUE: 16
PIF_VALUE: 19
PIF_VALUE: 1
PIF_VALUE: 2
PIF_VALUE: 1
PIF_VALUE: 20
PIF_VALUE: 17
PIF_VALUE: 0
PIF_VALUE: 14
PIF_VALUE: 16
PIF_VALUE: 17
PIF_VALUE: 15
PIF_VALUE: 5
PIF_VALUE: 1
PIF_VALUE: 16
PIF_VALUE: 15
PIF_VALUE: 17
PIF_VALUE: 1
PIF_VALUE: 17
PIF_VALUE: 17
PIF_VALUE: 21
PIF_VALUE: 17
PIF_VALUE: 17
PIF_VALUE: 1
PIF_VALUE: 16
PIF_VALUE: 17
PIF_VALUE: 18
PIF_VALUE: 1
PIF_VALUE: 17
PIF_VALUE: 15
PIF_VALUE: 15
PIF_VALUE: 1
PIF_VALUE: 15
PIF_VALUE: 17
PIF_VALUE: 18
PIF_VALUE: 0
PIF_VALUE: 16
PIF_VALUE: 15
PIF_VALUE: 17
PIF_VALUE: 6
PIF_VALUE: 17
PIF_VALUE: 16
PIF_VALUE: 2
PIF_VALUE: 1
PIF_VALUE: 15
PIF_VALUE: 16
PIF_VALUE: 16
PIF_VALUE: 0
PIF_VALUE: 2
PIF_VALUE: 15
PIF_VALUE: 5
PIF_VALUE: 1
PIF_VALUE: 17
PIF_VALUE: 1
PIF_VALUE: 17
PIF_VALUE: 17
PIF_VALUE: 15
PIF_VALUE: 18
PIF_VALUE: 2
PIF_VALUE: 15
PIF_VALUE: 17
PIF_VALUE: 16
PIF_VALUE: 17
PIF_VALUE: 15
PIF_VALUE: 1
PIF_VALUE: 17
PIF_VALUE: 1
PIF_VALUE: 1
PIF_VALUE: 15
PIF_VALUE: 16
PIF_VALUE: 16
PIF_VALUE: 17
PIF_VALUE: 20
PIF_VALUE: 1
PIF_VALUE: 0
PIF_VALUE: 16
PIF_VALUE: 17
PIF_VALUE: 17

## 2021-01-01 ASSESSMENT — ENCOUNTER SYMPTOMS
WHEEZING: 0
ANAL BLEEDING: 0
VOMITING: 0
BACK PAIN: 0
CHEST TIGHTNESS: 0
ABDOMINAL PAIN: 0
PHOTOPHOBIA: 0
RHINORRHEA: 0
EYES NEGATIVE: 1
SINUS PRESSURE: 0
SHORTNESS OF BREATH: 1
ABDOMINAL PAIN: 0
SHORTNESS OF BREATH: 1
COLOR CHANGE: 0
NAUSEA: 1
COUGH: 1
DIARRHEA: 0
COUGH: 0
WHEEZING: 0
SHORTNESS OF BREATH: 1
TACHYPNEA: 1
CONSTIPATION: 0
SHORTNESS OF BREATH: 1

## 2021-01-01 ASSESSMENT — PAIN DESCRIPTION - FREQUENCY: FREQUENCY: INTERMITTENT

## 2021-01-01 ASSESSMENT — PAIN DESCRIPTION - DESCRIPTORS
DESCRIPTORS: ACHING
DESCRIPTORS: ACHING;DISCOMFORT

## 2021-01-01 ASSESSMENT — PAIN DESCRIPTION - ORIENTATION: ORIENTATION: LEFT

## 2021-01-01 ASSESSMENT — PAIN DESCRIPTION - LOCATION: LOCATION: ABDOMEN

## 2021-01-01 ASSESSMENT — PAIN - FUNCTIONAL ASSESSMENT
PAIN_FUNCTIONAL_ASSESSMENT: 0-10
PAIN_FUNCTIONAL_ASSESSMENT: 0-10

## 2021-01-05 NOTE — TELEPHONE ENCOUNTER
Edward Uriostegui is calling to request a refill on the following medication(s):    Medication Request:    Last filled 11/25/2020 #30 with 0 RF    Requested Prescriptions     Pending Prescriptions Disp Refills    citalopram (CELEXA) 20 MG tablet [Pharmacy Med Name: Citalopram Hydrobromide Oral Tablet 20 MG] 30 tablet 0     Sig: TAKE 1 TABLET BY MOUTH EVERY DAY       Last Visit Date (If Applicable):  87/72/9442    Next Visit Date:    3/30/2021

## 2021-03-26 NOTE — PROGRESS NOTES
Dr. Keena Jewell aware patient was not a pre-testing patient but an add on for a 7:30am case with a extensive history. Dr. Keena Jewell reviewed all cardiac information and request we do a cbc and bmp day of the surgery otherwise no further intervention is required at present time.

## 2021-04-02 NOTE — H&P
History and Physical Update    Pt Name: Mariely Callaway  MRN: [id number]  YOB: 1935  Date of evaluation: 4/2/2021      [x] I have reviewed the hardcopy Progress Note by Dr Rani Villareal dated 3/18/21 in epic which meets the criteria for an Interval History and Physical note and is attached below. [x] I have examined  Mariely Callaway, an 83 yo male with multiple comorbidities managed by specialist.   There are no changes to the patient who is scheduled for a cystoscopy bladder biopsy by Dr Sarahy Packer for bladder tumor   The patient c/o LLQ discomfort  He denies new health changes, fever, chills, wheezing, cough, increased SOB, chest pain, open sores or wounds. Last ASA 81mg 4/1/21    Hx CAD s/p CABG 7/2000  S/p angioplasty with stent 2003 by Dr Joshua Daigle  Last Cardiac Cath 2/2014 on record (refer to epic) Hx AICD placement 1/30/17 Today denies increased SOB, palpitations, firing of defibrillator, dizziness, syncope, chest pain or use of Nitroglycerin. Last visit with Dr Joshua Daigle in 3/2021  Last ASA 81mg 4/1/21    Past Medical History:     Past Medical History:   Diagnosis Date    Abnormal tilt table test 08/2016    POSITIVE AFTER SYNCOPAL EPISODE    Anxiety     Arthritis     Asthma     mild intermittent w/o complication    Bradycardia     CAD (coronary artery disease)     CHF (congestive heart failure) (HCC)     Chronic systolic (congestive) heart failure (HCC)     COPD (chronic obstructive pulmonary disease) (HCC)     DVT (deep vein thrombosis) in pregnancy 10/2014    EVIDENCE OF LT FEMORAL AND POPLITEAL DVT / STARTED ON ANTICOAGULATION     Frequent PVCs     GERD (gastroesophageal reflux disease)     Heart attack [facility] 2000    Hx of blood clots     clot in left leg 3 years ago    Hyperlipidemia     Hypertension     Kidney stones 2009?     patient states passed    Neurocardiogenic syncope     On prednisone therapy     KENDELL (obstructive sleep apnea)     MILD KENDELL B SLEEP SUDY    Poor

## 2021-04-02 NOTE — ANESTHESIA POSTPROCEDURE EVALUATION
Department of Anesthesiology  Postprocedure Note    Patient: Mariely Callaway  MRN: [id number]  YOB: 1935  Date of evaluation: 4/2/2021  Time:  10:53 AM     Procedure Summary     Date: 04/02/21 Room / Location: [place] [address]    Anesthesia Start: 2419 Anesthesia Stop: 9615    Procedure: CYSTOSCOPY, DILITATION, CAUTERIZATION OF PROSTATE VARICES. (N/A ) Diagnosis: (DX BLADDER TUMOR)    Surgeons: Crys Cooley MD Responsible Provider: Cory Tanner MD    Anesthesia Type: MAC ASA Status: 4          Anesthesia Type: MAC    Karol Phase I:      Karol Phase II:      Last vitals: Reviewed and per EMR flowsheets.        Anesthesia Post Evaluation    Complications: no

## 2021-04-02 NOTE — OP NOTE
Operative Note      Patient: Rose Hernandez  YOB: 1935  MRN: [id number]    Date of Procedure: 4/2/2021    Pre-Op Diagnosis: DX weak urinary stream, dysuria, prostatitis, rule out recurrent bladder tumor    Post-Op Diagnosis: No evidence of bladder tumor, bladder outlet obstruction, prostate varices posterior urethral stricture       Procedure(s):  CYSTOSCOPY, DILITATION, CAUTERIZATION OF PROSTATE VARICES. Surgeon(s):  Whitney Grier MD    Assistant:   * No surgical staff found *    Anesthesia: Monitor Anesthesia Care    Estimated Blood Loss (mL): Minimal    Complications: None    Specimens:   ID Type Source Tests Collected by Time Destination   1 : CYSTO URINE Urine Bladder FISH, UROVYSION, URINE RT REFLEX TO CULTURE Whitney Grier MD 4/2/2021 7078        Implants:  * No implants in log *      Drains: * No LDAs found *    Indication: 82-year-old male, has been complaining of weakness of the urinary stream, dysuria, dribbling, the patient has microscopic hematuria, he has responded to antibiotic therapy off-and-on    He has a history of transitional cell carcinoma of the bladder, he was scheduled for cystoscopy as well as urethral dilatation    Detailed Description of Procedure:   Patient was brought to the operating room, positioned in dorsal lithotomy, proper patient identification procedure identification prepping and draping in the usual sterile manner. We entered the bladder with the cystoscope, significant enlargement of the prostate with a large lobe causing bladder outlet obstruction, also noted a bulbous urethral stricture. We used the [employer], we dilated the urethral stricture through size 22 [place]. Patient has significant prostate varices on the large median lobe causing obstruction. Bleeding from the prostate varices identified. We used the electric and we cauterized the bleeders in the prostate varices.     Significant prostate calcifications also identified between the verumontanum and the bladder neck. The anterior the bladder was carefully examined, no evidence of recurrent bladder tumor, the trigone is normal ureteral orifices effluxing clear urine    Bladder trabeculations associated with bladder outlet obstruction also identified. At the completion the bladder was emptied the scope removed the patient returned to recovery room in stable condition. Recommendations:  The patient will return to the office for follow-up to reassess dysuria weak urinary stream and microscopic hematuria    The option of transurethral resection of the prostate was discussed with the patient and his family this will be addressed based upon the patient's symptoms if needed    Electronically signed by Meet Castellanos MD on 4/2/2021 at 8:46 AM

## 2021-04-02 NOTE — ANESTHESIA PRE PROCEDURE
Department of Anesthesiology  Preprocedure Note       Name:  Christina Salcido   Age:  82 y.o.  :  1935                                          MRN:  [id number]         Date:  2021      Surgeon: Ok Floor):  Fifi Sánchez MD    Procedure: Procedure(s):  CYSTOSCOPY BLADDER BIOPSY    Medications prior to admission:   Prior to Admission medications    Medication Sig Start Date End Date Taking? Authorizing Provider   citalopram (CELEXA) 20 MG tablet TAKE 1 TABLET BY MOUTH EVERY DAY  21  Yes Cassandra Viera MD   levothyroxine (SYNTHROID) 50 MCG tablet TAKE 1 TABLET BY MOUTH EVERY DAY  20  Yes Cassandra Viera MD   losartan (COZAAR) 25 MG tablet TAKE 1 TABLET BY MOUTH ONE TIME A DAY 19  Yes Historical Provider, MD   meclizine (ANTIVERT) 25 MG tablet Take 25 mg by mouth as needed for Dizziness    Yes Historical Provider, MD   metoprolol tartrate (LOPRESSOR) 25 MG tablet Take 25 mg by mouth nightly  10/17/16  Yes Cassandra Viera MD   allopurinol (ZYLOPRIM) 100 MG tablet daily Indications: TAKES DAILY  11/6/15  Yes Historical Provider, MD   predniSONE (DELTASONE) 1 MG tablet Take 3 mg by mouth daily    Yes Historical Provider, MD   aspirin 81 MG tablet Take 81 mg by mouth daily. Yes Historical Provider, MD   nitroGLYCERIN (NITROSTAT) 0.4 MG SL tablet Place 0.4 mg under the tongue every 5 minutes as needed for Chest pain.     Historical Provider, MD       Current medications:    Current Facility-Administered Medications   Medication Dose Route Frequency Provider Last Rate Last Admin    lactated ringers infusion   Intravenous Continuous Yanelis Cerna MD 50 mL/hr at 21 0709 New Bag at 21 0709    lidocaine PF 1 % injection 1 mL  1 mL Intradermal Once PRN Yanelis Cerna MD           Allergies:  No Known Allergies    Problem List:    Patient Active Problem List   Diagnosis Code    S/P CABG x 3 () Z95.1    CHF (congestive heart failure), NYHA class II ([facility] [address] I50.9    S/P cardiac cath (14- CRT    CARDIAC DEFIBRILLATOR PLACEMENT  2017    CRT    CARDIAC SURGERY  2000    CABG X3 [place], SVG-DIAG DBG-RCA DR. Kevin Millard WITH STENT PLACEMENT      [facility] Maira Kelly    CYSTOSCOPY Right 10/06/2014    cysto with stent    CYSTOSCOPY  2019    transurethral resection bladder by Dr. Isaias Ibarra 2019    CYSTOSCOPY TRANSURETHRAL RESECTION BLADDER performed by Heather Paulson MD at [address] [place] [address] N/A 2020    CYSTOSCOPY performed by Heather Paulson MD at [place] [address] CATH LAB PROCEDURE  2005 - 2014    BOTH TIMES SHOWING ALL 3 GRAFTS PATENT WITH OCCLUDED LCX / ATTEMPTED PCI TO LCX WAS UNSUCCESSFUL    PACEMAKER PLACEMENT      AICD       Social History:    Social History     Tobacco Use    Smoking status: Former Smoker     Quit date: 1997     Years since quittin.3    Smokeless tobacco: Current User     Types: Chew   Substance Use Topics    Alcohol use: No     Comment: socially                                Ready to quit: Not Answered  Counseling given: Not Answered      Vital Signs (Current):   Vitals:    21 0640 21 0644 21 0651   BP: 139/61     Pulse: (!) 41 85    Resp: 18     Temp: 97.6 °F (36.4 °C)     TempSrc: Temporal     SpO2: 98%     Weight:   179 lb 1.6 oz (81.2 kg)   Height:   6' (1.829 m)                                              BP Readings from Last 3 Encounters:   21 139/61   20 120/72   20 (!) 146/87       NPO Status: Time of last liquid consumption:                         Time of last solid consumption:                         Date of last liquid consumption: 21                        Date of last solid food consumption: 21    BMI:   Wt Readings from Last 3 Encounters:   21 179 lb 1.6 oz (81.2 kg)   20 176 lb (79.8 kg)   20 178 lb (80.7 kg) Body mass index is 24.29 kg/m². CBC:   Lab Results   Component Value Date    WBC 8.6 04/02/2021    RBC 4.82 04/02/2021    RBC 4.85 01/31/2012    HGB 14.8 04/02/2021    HCT 45.1 04/02/2021    MCV 93.6 04/02/2021    RDW 13.9 04/02/2021     04/02/2021     01/31/2012       CMP:   Lab Results   Component Value Date     04/02/2021    K 3.9 04/02/2021     04/02/2021    CO2 22 04/02/2021    BUN 25 04/02/2021    CREATININE 1.20 04/02/2021    GFRAA >60 04/02/2021    LABGLOM 58 04/02/2021    GLUCOSE 101 04/02/2021    GLUCOSE 122 01/31/2012    PROT 7.2 04/30/2020    CALCIUM 8.9 04/02/2021    BILITOT 0.60 04/30/2020    ALKPHOS 75 04/30/2020    AST 17 04/30/2020    ALT 13 04/30/2020       POC Tests: No results for input(s): POCGLU, POCNA, POCK, POCCL, POCBUN, POCHEMO, POCHCT in the last 72 hours.     Coags:   Lab Results   Component Value Date    PROTIME 10.6 08/29/2016    INR 1.0 08/29/2016    APTT 22.4 08/29/2016       HCG (If Applicable): No results found for: PREGTESTUR, PREGSERUM, HCG, HCGQUANT     ABGs: No results found for: PHART, PO2ART, WCL5IGS, DMM4CFZ, BEART, R3RGNWMA     Type & Screen (If Applicable):  No results found for: LABABO, LABRH    Drug/Infectious Status (If Applicable):  No results found for: HIV, HEPCAB    COVID-19 Screening (If Applicable):   Lab Results   Component Value Date    COVID19 Not Detected 04/02/2021    COVID19 Indeterminate 03/29/2021    COVID19 Not Detected 08/17/2020           Anesthesia Evaluation    Airway: Mallampati: II  TM distance: >3 FB   Neck ROM: full  Mouth opening: > = 3 FB Dental:          Pulmonary:   (+) shortness of breath: chronic and no interval change,  sleep apnea:                             Cardiovascular:    (+) pacemaker: AICD, CABG/stent:, CHF:,     (-) murmur                Neuro/Psych:                ROS comment: NC syncope GI/Hepatic/Renal:             Endo/Other:                     Abdominal:           Vascular: Anesthesia Plan      MAC     ASA 4             Anesthetic plan and risks discussed with patient.                       Sarah Carranza MD   4/2/2021

## 2021-04-06 NOTE — TELEPHONE ENCOUNTER
Mady Fitzgerald is calling to request a refill on the following medication(s):    Medication Request:  Requested Prescriptions     Pending Prescriptions Disp Refills    levothyroxine (SYNTHROID) 50 MCG tablet [Pharmacy Med Name: Levothyroxine Sodium Oral Tablet 50 MCG] 90 tablet 0     Sig: TAKE 1 TABLET BY MOUTH EVERY DAY     Last filled 12/21/20 90 day 1 refill  Not due    Last Visit Date (If Applicable):  05/72/0145    Next Visit Date:    Visit date not found

## 2021-04-09 NOTE — TELEPHONE ENCOUNTER
Cassy Carlson is calling to request a refill on the following medication(s):    Medication Request:  Requested Prescriptions     Pending Prescriptions Disp Refills    levothyroxine (SYNTHROID) 50 MCG tablet [Pharmacy Med Name: Levothyroxine Sodium Oral Tablet 50 MCG] 90 tablet 0     Sig: TAKE 1 TABLET BY MOUTH EVERY DAY    citalopram (CELEXA) 20 MG tablet [Pharmacy Med Name: Citalopram Hydrobromide Oral Tablet 20 MG] 90 tablet 0     Sig: TAKE 1 TABLET BY MOUTH EVERY DAY     Last filled 12/21/20 90 day 1 refill  Order pending    Last Visit Date (If Applicable):  95/39/3549    Next Visit Date:    Visit date not found

## 2021-06-29 NOTE — OP NOTE
Operative Note      Patient: Blaine Edwards  YOB: 1935  MRN: [id number]    Date of Procedure: 6/29/2021    Pre-Op Diagnosis: Evaluation for BLADDER CA    Post-Op Diagnosis: Same and Waiting pathology report       Procedure(s):  CYSTOSCOPY BILATERAL PYELOGRAM WITH  BLADDER BIOPSY AND CAUTERIZATION    Surgeon(s):  Suman Schroeder MD    Assistant:   * No surgical staff found *    Anesthesia: General    Estimated Blood Loss (mL): Minimal    Complications: None    Specimens:   ID Type Source Tests Collected by Time Destination   1 : CYSTO URINE Urine Urine, Cystoscopic URINE RT REFLEX TO CULTURE Suman Schroeder MD 6/29/2021 0915    A : BLADDER FLOOR LATERAL RIGHT URETERAL ORPHOUS Tissue Bladder SURGICAL PATHOLOGY Suman Schroeder MD 6/29/2021 0924    B : BIOPSY BLADDER NECK 7 O'CLOCK POSITION Tissue Bladder SURGICAL PATHOLOGY Suman Schroeder MD 6/29/2021 0940        Implants:  * No implants in log *      Drains:   Urethral Catheter Coude 18 fr (Active)   Catheter Indications Perioperative use for selected surgical procedures 06/29/21 1001   Securement Device Date Changed 06/29/21 06/29/21 1001   Site Assessment No urethral drainage 06/29/21 1001       Findings: Indication: This patient is 82years old, he has had a history of bladder tumor, prior cystoscopy did not show a tumor nonetheless the patient had a positive UroVysion FISH test as well as a positive CX bladder consequently a CT urogram was then obtained    The CT urogram demonstrated small nonobstructing calculi in multiple calyces no evidence of hydronephrosis. The patient discomfort is mainly in the pelvic area and over the bladder area he has had hesitancy and dysuria focus today was to proceed with retrograde pyelogram manage any obstructing calculus and proceed with bladder biopsy.     Detailed Description of Procedure:   Patient was brought to the cystoscopy suite positioned in dorsolithotomy, proper patient identification procedure identification prepping and draping in the usual sterile manner. We entered the bladder with the cystoscope, prostate hypertrophy identified, the interior of the bladder was examined, significant bladder trabeculations with diverticuli and cellules as noted previously on CT imaging    Area of prior biopsy showed mucosal fibrosis. The area near the right ureteral orifice was examined and we noticed a papillary tumor lateral to the right ureteral orifice as well as some thickening of the bladder wall we proceeded with resection biopsy. We then evaluated the area near the bladder neck at the 7 o'clock position and this was noted to be also thickened and slightly elevated we performed multiple biopsies. The specimen was sent to pathology the bleeders were coagulated as encountered at the completion no other areas of biopsies were included. The bladder was irrigated the scope removed a Diaz catheter was inserted left indwelling connected to drainage bag the patient returned to recovery room in stable condition.     Recommendation: We recommend to maintain the indwelling catheter for the next 2 days because of risk of urinary retention,  The patient will return to the office on Thursday for catheter removal  the patient will be on antibiotic therapy as well as pain management and follow-up visit at the office in 10 days awaiting pathology report    Electronically signed by Lilly Packer MD on 6/29/2021 at 10:09 AM

## 2021-06-29 NOTE — ANESTHESIA POSTPROCEDURE EVALUATION
POST- ANESTHESIA EVALUATION       Pt Name: Jarrod Stewart  MRN: [id number]  YOB: 1935  Date of evaluation: 6/29/2021  Time:  11:51 AM      BP (!) 150/91   Pulse 87   Temp 97.8 °F (36.6 °C) (Temporal)   Resp 14   Ht 6' (1.829 m)   Wt 178 lb (80.7 kg)   SpO2 93%   BMI 24.14 kg/m²      Consciousness Level  Awake  Cardiopulmonary Status  Stable  Pain Adequately Treated YES  Nausea / Vomiting  NO  Adequate Hydration  YES  Anesthesia Related Complications NONE      Electronically signed by Carlos Magana MD on 6/29/2021 at 11:51 AM       Department of Anesthesiology  Postprocedure Note    Patient: Jarrod Stewart  MRN: [id number]  [place]: 1935  Date of evaluation: 6/29/2021  Time:  11:51 AM     Procedure Summary     Date: 06/29/21 Room / Location: [facility] - INPATIENT    Anesthesia Start: 3943 Anesthesia Stop: 1004    Procedure: CYSTOSCOPY BILATERAL PYELOGRAM WITH  BLADDER BIOPSY AND CAUTERIZATION (Right Urethra) Diagnosis: (BLADDER CA)    Surgeons: Ely Caballero MD Responsible Provider: Carlos Magana MD    Anesthesia Type: general ASA Status: 3          Anesthesia Type: general    Karol Phase I: Karol Score: 10    Karol Phase II: Karol Score: 10    Last vitals: Reviewed and per EMR flowsheets.        Anesthesia Post Evaluation

## 2021-06-29 NOTE — ANESTHESIA PRE PROCEDURE
Department of Anesthesiology  Preprocedure Note       Name:  Nate Goldman   Age:  82 y.o.  :  1935                                          MRN:  [id number]         Date:  2021      Surgeon: Henri Anthony):  Christina Edmondson MD    Procedure: Procedure(s):  CYSTO WITH HOLMIUM LASER LITHOTRIPSY POSSIBLE BLADDER BIOPSY    Medications prior to admission:   Prior to Admission medications    Medication Sig Start Date End Date Taking? Authorizing Provider   levothyroxine (SYNTHROID) 50 MCG tablet TAKE 1 TABLET BY MOUTH EVERY DAY  21  Yes Jerrel Romberg, MD   citalopram (CELEXA) 20 MG tablet TAKE 1 TABLET BY MOUTH EVERY DAY  21  Yes Jerrel Romberg, MD   losartan (COZAAR) 25 MG tablet TAKE 1 TABLET BY MOUTH ONE TIME A DAY 19  Yes Historical Provider, MD   metoprolol tartrate (LOPRESSOR) 25 MG tablet Take 25 mg by mouth nightly  10/17/16  Yes Jerrel Romberg, MD   allopurinol (ZYLOPRIM) 100 MG tablet daily Indications: TAKES DAILY  11/6/15  Yes Historical Provider, MD   predniSONE (DELTASONE) 1 MG tablet Take 3 mg by mouth daily    Yes Historical Provider, MD   meclizine (ANTIVERT) 25 MG tablet Take 25 mg by mouth as needed for Dizziness     Historical Provider, MD   aspirin 81 MG tablet Take 81 mg by mouth daily. Historical Provider, MD   nitroGLYCERIN (NITROSTAT) 0.4 MG SL tablet Place 0.4 mg under the tongue every 5 minutes as needed for Chest pain. Historical Provider, MD       Current medications:    No current facility-administered medications for this encounter.        Allergies:  No Known Allergies    Problem List:    Patient Active Problem List   Diagnosis Code    S/P CABG x 3 () Z95.1    CHF (congestive heart failure), NYHA class II ([facility]) I50.9    S/P cardiac cath (14-Dr. Agnieszka Arnold) Z98.890    Anxiety F41.9    CAD (coronary artery disease) I25.10    Hyperlipemia E78.5    Stented coronary artery Z95.5    GERD (gastroesophageal reflux disease) K21.9    Ureteral calculus, right N20.1    IFG (impaired fasting glucose) R73.01    Renal insufficiency N28.9    Essential hypertension I10    Mobility impaired Z74.09    Mild intermittent asthma without complication D25.41    S/P implantation of automatic cardioverter/defibrillator (AICD)-CRT 1/30/17 - Dr. Brittney Montalvo Z95.810    Bladder polyp D41.4    Pneumococcal vaccination declined Z28.21    Influenza vaccination declined Z28.21    Polymyalgia rheumatica ([facility] [address] M35.3    On prednisone therapy Z79.52    Neurocardiogenic syncope R55    Hypothyroid E03.9       Past Medical History:        Diagnosis Date    Abnormal tilt table test 08/2016    POSITIVE AFTER SYNCOPAL EPISODE    Anxiety     Arthritis     Asthma     mild intermittent w/o complication    Bradycardia     CAD (coronary artery disease)     CH[facility]congestive heart failure) [facility])     Chronic systolic (congestive) heart failure (HCC)     COPD (chronic obstructive pulmonary disease) ([facility] [address]     DVT (deep vein thrombosis) in pregnancy 10/2014    EVIDENCE OF LT FEMORAL AND POPLITEAL DVT / STARTED ON ANTICOAGULATION     Frequent PVCs     GERD (gastroesophageal reflux disease)     Heart attack ([facility] [address] 2000    Hx of blood clots     clot in left leg 3 years ago    Hyperlipidemia     Hypertension     Kidney stones 2009? patient states passed    Neurocardiogenic syncope     On prednisone therapy     KENDELL (obstructive sleep apnea)     MILD KENDELL B SLEEP SUDY    Poor historian     Renal insufficiency     SOB (shortness of breath)     Syncopal episodes     Thyroid disease     hypothyroidism       Past Surgical History:        Procedure Laterality Date    CARDIAC DEFIBRILLATOR PLACEMENT  01/30/2017    BI-V ICD DR Jair Johnston    CARDIAC DEFIBRILLATOR PLACEMENT  01/30/2017    CRT    CARDIAC DEFIBRILLATOR PLACEMENT  01/30/2017    CRT    CARDIAC SURGERY  7/12/2000    CABG X3 LIMA-LAD, SVG-DIAG DBG-RCA DR. LIZARRAGA    COLONOSCOPY      CORONARY ANGIOPLASTY WITH STENT PLACEMENT     Nick Sanabria    CYSTOSCOPY Right 10/06/2014    cysto with stent    CYSTOSCOPY  2019    transurethral resection bladder by Dr. Haider Moeller 2019    CYSTOSCOPY TRANSURETHRAL RESECTION BLADDER performed by Miah Brown MD at [address] [facility] 2021    CYSTOSCOPY, DILITATION, CAUTERIZATION OF PROSTATE VARICES.  performed by Miah Brown MD at [address] [facility] N/A 2020    CYSTOSCOPY performed by Miah Brown MD at [place] [address] CATH LAB PROCEDURE  2005 - 2014    BOTH TIMES SHOWING ALL 3 GRAFTS PATENT WITH OCCLUDED LCX / ATTEMPTED PCI TO LCX WAS UNSUCCESSFUL    PACEMAKER PLACEMENT      AICD       Social History:    Social History     Tobacco Use    Smoking status: Former Smoker     Quit date: 1997     Years since quittin.6    Smokeless tobacco: Current User     Types: Chew   Substance Use Topics    Alcohol use: No     Comment: socially                                Ready to quit: Not Answered  Counseling given: Not Answered      Vital Signs (Current):   Vitals:    21 0711   BP: (!) 151/70   Pulse: 86   Resp: 20   Temp: 97.1 °F (36.2 °C)   TempSrc: Temporal   SpO2: 97%                                              BP Readings from Last 3 Encounters:   21 (!) 151/70   21 (!) 152/99   21 (!) 100/54       NPO Status:                                                                                 BMI:   Wt Readings from Last 3 Encounters:   21 179 lb 1.6 oz (81.2 kg)   20 176 lb (79.8 kg)   20 178 lb (80.7 kg)     There is no height or weight on file to calculate BMI.    CBC:   Lab Results   Component Value Date    WBC 8.6 2021    RBC 4.82 2021    RBC 4.85 2012    HGB 14.8 2021    HCT 45.1 2021    MCV 93.6 2021    RDW 13.9 2021     2021     2012       CMP:   Lab Results   Component Value Date     04/02/2021    K 3.9 04/02/2021     04/02/2021    CO2 22 04/02/2021    BUN 25 04/02/2021    CREATININE 1.20 04/02/2021    GFRAA >60 04/02/2021    LABGLOM 58 04/02/2021    GLUCOSE 101 04/02/2021    GLUCOSE 122 01/31/2012    PROT 7.2 04/30/2020    CALCIUM 8.9 04/02/2021    BILITOT 0.60 04/30/2020    ALKPHOS 75 04/30/2020    AST 17 04/30/2020    ALT 13 04/30/2020       POC Tests: No results for input(s): POCGLU, POCNA, POCK, POCCL, POCBUN, POCHEMO, POCHCT in the last 72 hours. Coags:   Lab Results   Component Value Date    PROTIME 10.6 08/29/2016    INR 1.0 08/29/2016    APTT 22.4 08/29/2016       HCG (If Applicable): No results found for: PREGTESTUR, PREGSERUM, HCG, HCGQUANT     ABGs: No results found for: PHART, PO2ART, BIF1CFR, ZHD9BPG, BEART, G6JFHLBL     Type & Screen (If Applicable):  No results found for: LABABO, LABRH    Drug/Infectious Status (If Applicable):  No results found for: HIV, HEPCAB    COVID-19 Screening (If Applicable):   Lab Results   Component Value Date    COVID19 Not Detected 04/02/2021    COVID19 Indeterminate 03/29/2021    COVID19 Not Detected 08/17/2020           Anesthesia Evaluation  Patient summary reviewed and Nursing notes reviewed no history of anesthetic complications:   Airway: Mallampati: I  TM distance: >3 FB   Neck ROM: limited  Mouth opening: > = 3 FB Dental:    (+) upper dentures and lower dentures      Pulmonary:normal exam    (+) COPD:  shortness of breath:  sleep apnea:                             Cardiovascular:    (+) hypertension:, pacemaker: AICD and pacemaker, past MI:, CAD:, CABG/stent:, CHF:,                   Neuro/Psych:               GI/Hepatic/Renal:   (+) GERD:, renal disease: CRI,           Endo/Other:    (+) hypothyroidism: arthritis (polymyalgia rheumatica):., .                 Abdominal:             Vascular:   + DVT, .        Other Findings:             Anesthesia Plan      general     ASA 3       Induction: intravenous. MIPS: Postoperative opioids intended and Prophylactic antiemetics administered. Anesthetic plan and risks discussed with patient. Plan discussed with CRNA.                   Mary Smith MD   6/29/2021

## 2021-06-29 NOTE — H&P
History and Physical Service   Geraldine Ricci    HISTORY AND PHYSICAL EXAMINATION            Date of Evaluation: 6/29/2021  Patient name:  Toshia Starks  MRN:   [id number]  YOB: 1935  PCP:    Ming Edwards MD    History Obtained From:     Patient, medical records    History of Present Illness: This is Toshia Starks a 82 y.o. male with multiple comorbidities presents today for a cystoscopy with holmium laser lithotripsy, possible bladder biopsy by Dr. Isac Castro for bladder cancer. Patient follows with Urologist, Dr Isac Castro for Hx transitional cell carcinoma bladder cancer. He c/o dysuria, frequency, weak stream and prostatitis. 4/2/21 patient had cystoscopy,and dilation done. Per OP note \"significant enlargement of the prostate with a large lobe causing bladder outlet obstruction, also noted a bulbous urethral stricture. significant prostate varices on the large median lobe causing obstruction. \"  Patient continues to have poor urine stream but denies gross hematuria. Dr Chuy Salinas ordered additionally testing. CT Urogram done on 6/8/21 at [facility] which showed \"Multiple subcentimeter bladder diverticula.  No collecting system lesions or enhancing renal lesions. Nonobstructing nephrolithiasis. \"  Patient presents for his laser lithotripsy with possible bladder biopsy. Denies fever, chills, cough, wheezing or SOB     Hx CAD s/p CABG 7/2000  S/p angioplasty with stent 2003 by Dr Moreno Clancy  Last Cardiac Cath 2/2014 on record (refer to epic) Hx AICD placement 1/30/17 Today denies increased SOB, palpitations, firing of defibrillator, dizziness, syncope, chest pain or use of Nitroglycerin.  Last visit with Dr Moreno Clancy in 3/2021  Last ASA 81mg 6 days ago     Past Medical History:     Past Medical History:   Diagnosis Date    Abnormal tilt table test 08/2016    POSITIVE AFTER SYNCOPAL EPISODE    Anxiety     Arthritis     Asthma     mild intermittent w/o complication    Bradycardia  CAD (coronary artery disease)     CHF (congestive heart failure) (HCC)     Chronic systolic (congestive) heart failure (HCC)     COPD (chronic obstructive pulmonary disease) (HCC)     DVT (deep vein thrombosis) in pregnancy 10/2014    EVIDENCE OF LT FEMORAL AND POPLITEAL DVT / STARTED ON ANTICOAGULATION     Frequent PVCs     GERD (gastroesophageal reflux disease)     Heart attack ([facility] [address] 2000    Hx of blood clots     clot in left leg 3 years ago    Hyperlipidemia     Hypertension     Kidney stones 2009? patient states passed    Neurocardiogenic syncope     On prednisone therapy     KENDELL (obstructive sleep apnea)     MILD KENDELL B SLEEP SUDY    Poor historian     Renal insufficiency     SOB (shortness of breath)     Syncopal episodes     Thyroid disease     hypothyroidism        Past Surgical History:     Past Surgical History:   Procedure Laterality Date    CARDIAC DEFIBRILLATOR PLACEMENT  01/30/2017    BI-V ICD DR Ivette Sacks    CARDIAC DEFIBRILLATOR PLACEMENT  01/30/2017    CRT    CARDIAC DEFIBRILLATOR PLACEMENT  01/30/2017    CRT    CARDIAC SURGERY  7/12/2000    CABG X3 LIMA-LAD, SVG-DIAG DBG-RCA DR. Maritza Cuevas      CORONARY ANGIOPLASTY WITH STENT PLACEMENT  2003    Jessy Haro    CYSTOSCOPY Right 10/06/2014    cysto with stent    CYSTOSCOPY  05/07/2019    transurethral resection bladder by Dr. Renetta Pillai 5/7/2019    CYSTOSCOPY TRANSURETHRAL RESECTION BLADDER performed by Tatum Taylor MD at [address] [place] [address] 4/2/2021    CYSTOSCOPY, DILITATION, CAUTERIZATION OF PROSTATE VARICES.  performed by Tatum Taylor MD at [address] [facility] 8/21/2020    CYSTOSCOPY performed by Tatum Taylor MD at [facility] CATH LAB PROCEDURE  11/2005 - 2/2014    BOTH TIMES SHOWING ALL 3 GRAFTS PATENT WITH OCCLUDED LCX / ATTEMPTED PCI TO LCX WAS UNSUCCESSFUL    PACEMAKER PLACEMENT AICD        Medications Prior to Admission:     Prior to Admission medications    Medication Sig Start Date End Date Taking? Authorizing Provider   levothyroxine (SYNTHROID) 50 MCG tablet TAKE 1 TABLET BY MOUTH EVERY DAY  4/12/21  Yes Trent Aguilar MD   citalopram (CELEXA) 20 MG tablet TAKE 1 TABLET BY MOUTH EVERY DAY  4/12/21  Yes Trent Aguilar MD   losartan (COZAAR) 25 MG tablet TAKE 1 TABLET BY MOUTH ONE TIME A DAY 11/14/19  Yes Historical Provider, MD   metoprolol tartrate (LOPRESSOR) 25 MG tablet Take 25 mg by mouth nightly  10/17/16  Yes Trent Aguilar MD   allopurinol (ZYLOPRIM) 100 MG tablet daily Indications: TAKES DAILY  11/6/15  Yes Historical Provider, MD   predniSONE (DELTASONE) 1 MG tablet Take 3 mg by mouth daily    Yes Historical Provider, MD   meclizine (ANTIVERT) 25 MG tablet Take 25 mg by mouth as needed for Dizziness     Historical Provider, MD   aspirin 81 MG tablet Take 81 mg by mouth daily. Historical Provider, MD   nitroGLYCERIN (NITROSTAT) 0.4 MG SL tablet Place 0.4 mg under the tongue every 5 minutes as needed for Chest pain. Historical Provider, MD        Allergies:     Patient has no known allergies. Social History:     Tobacco:    reports that he quit smoking about 23 years ago. His smokeless tobacco use includes chew. Alcohol:      reports no history of alcohol use. Drug Use:  reports no history of drug use. Family History:     Family History   Problem Relation Age of Onset    Heart Attack Father        Review of Systems:     Positive and Negative as described in HPI. CONSTITUTIONAL:  negative for fevers, chills, sweats, fatigue, weight loss  HEENT:  negative for vision, hearing changes, runny nose, throat pain  RESPIRATORY: Asthma/COPD on no medication  KENDELL  No CPAP negative for shortness of breath, cough, congestion, wheezing.   CARDIOVASCULAR: defibrillator as not fired  See HPI Follows with Dr Jonathan Rincon  Hx DVT 2014 negative for chest pain, palpitations. GASTROINTESTINAL: acid reflux negative for nausea, vomiting, diarrhea, constipation, change in bowel habits, abdominal pain   GENITOURINARY: Hx kidney stones Hx bladder cancer  See HPI    INTEGUMENT:  negative for rash, skin lesions  HEMATOLOGIC/LYMPHATIC:  negative for swelling/edema   ALLERGIC/IMMUNOLOGIC:  negative for urticaria , itching  ENDOCRINE: thyroid dx on medication Followed by PCP   negative increase in drinking, increase in urination, hot or cold intolerance  MUSCULOSKELETAL:  negative joint pains, muscle aches, swelling of joints  NEUROLOGICAL:  negative for headaches, dizziness, lightheadedness, numbness, pain, tingling extremities  BEHAVIOR/PSYCH:  negative for depression, anxiety    Physical Exam:   BP (!) 151/70   Pulse 86   Temp 97.1 °F (36.2 °C) (Temporal)   Resp 20   Ht 6' (1.829 m)   Wt 178 lb (80.7 kg)   SpO2 97%   BMI 24.14 kg/m²     General Appearance:  alert, well appearing, and in no acute distress  Mental status: oriented to person, place, and time with normal affect  Head:  normocephalic, atraumatic. Eye: no icterus, redness, pupils equal and reactive, extraocular eye movements intact, conjunctiva clear  Ear: normal external ear, no discharge, hearing intact  Nose:  no drainage noted  Mouth: mucous membranes moist  Neck: supple, no carotid bruits, thyroid not palpable  Lungs: Bilateral equal air entry, clear to ausculation, no wheezing, rales or rhonchi, normal effort  No CVA   Cardiovascular:AICD left upper chest   HR 86 irregular with extra systole 2/30 sec at present normal rate, no murmur, gallop, rub.   Abdomen: Soft, nontender, nondistended, normal bowel sounds  Neurologic: There are no new focal motor or sensory deficits, normal muscle tone and bulk, no abnormal sensation, normal speech, cranial nerves II through XII grossly intact  Skin: No gross lesions, rashes, bruising or bleeding on exposed skin area  Extremities:  peripheral pulses palpable, no pedal edema or calf pain with palpation  Psych: normal affect     Investigations:      Laboratory Testing:  No results found for this or any previous visit (from the past 24 hour(s)). No results for input(s): HGB, HCT, WBC, MCV, PLATELET, NA, K, CL, CO2, BUN, CREATININE, GLUCOSE, INR, PROTIME, APTT, AST, ALT, LABALBU, HCG in the last 720 hours. No results for input(s): COVID19 in the last 720 hours. Imaging/Diagnostics:  Narrative    Bladder pain.  History of bladder sling. EXAM: CT urogram without and with contrast 120 mL Omnipaque 350, urogram protocol with thin section precontrast, postcontrast axial CT, split bolus technique, multiplanar reformats, thick section MIP volumes and 3-D volume rendered imaging.  All CT scans at this facility use dose modulation,   iterative reconstruction, and/or weight based dosing when appropriate to reduce radiation dose to as low as reasonably achievable. COMPARISON: None     CT ABDOMEN: Nonobstructing upper pole right 3 mm stone, mid pole 2 mm stone, nonobstructing left upper pole 1 mm stone, lower pole 2 mm stone.  Basilar calcifications.  Nonenhancing hyperdense 2.4 cm upper pole left renal cyst and mid pole 1 cm cyst.  No follow-up respiratory.  No enhancing lesions   in the liver, spleen, pancreas, adrenal glands or kidneys.  Nodular liver contour.  No splenomegaly or significant varices.  The gallbladder is not dilated.  No enlarged lymph nodes.  No abdominal aortic aneurysm. CT PELVIS: No dilated bowel loops or pericolonic fat stranding.  Mild colonic diverticulosis without diverticulitis.  Prostate gland 3.9 cm in cross-section.  Multiple tiny bilateral bladder diverticula.  No osseous lesions. CT urogram: Multiplanar reformats, 3-D volume rendered imaging was obtained. Bobo Cohn is adequate opacification of the calyces, ureters and urinary bladder.  No filling defects, strictures or dilatation. IMPRESSION:   Multiple subcentimeter bladder diverticula. Workstation:[id number]     Finalized by Darryl Ibrahim MD on 6/8/2021 10:49 AM  Addendum    Addendum by Sabino Palmer MD on 06/08/2021 10:56 AM   ADDENDUM Slightly nodular liver contour, nonspecific.  Early changes   of cirrhosis are possible. Workstation:[id number]     Finalized by Darryl Ibrahim MD on 6/8/2021 10:56 AM     Date: 06/08/21   Received From: [facility] [employer]          Diagnosis:      1. Bladder cancer    Plans:     1.  Cystoscopy with holmium laser lithotripsy , possible bladder biopsy      BEN Richards - CNP  6/29/2021  7:33 AM

## 2021-07-19 NOTE — TELEPHONE ENCOUNTER
Gael Ortega is calling to request a refill on the following medication(s):    Medication Request:  Requested Prescriptions     Pending Prescriptions Disp Refills    levothyroxine (SYNTHROID) 50 MCG tablet [Pharmacy Med Name: Levothyroxine Sodium Oral Tablet 50 MCG] 30 tablet 0     Sig: TAKE 1 TABLET BY MOUTH EVERY DAY     Last filled 4/12/21 30 day no refill    Last Visit Date (If Applicable):  08/05/4173    Next Visit Date:    Visit date not found

## 2021-07-26 NOTE — TELEPHONE ENCOUNTER
Brenden Joyner is calling to request a refill on the following medication(s):    Medication Request:  Requested Prescriptions     Refused Prescriptions Disp Refills    levothyroxine (SYNTHROID) 50 MCG tablet [Pharmacy Med Name: Levothyroxine Sodium Oral Tablet 50 MCG] 30 tablet 0     Sig: TAKE 1 TABLET BY MOUTH EVERY DAY     Refused By: Kelvin Lockett     Reason for Refusal: Patient needs an appointment       Last Visit Date (If Applicable):  81/56/5483    Next Visit Date:    Visit date not found

## 2021-07-28 NOTE — TELEPHONE ENCOUNTER
Shaye Vazquez is calling to request a refill on the following medication(s):    Medication Request:  Requested Prescriptions     Pending Prescriptions Disp Refills    levothyroxine (SYNTHROID) 50 MCG tablet [Pharmacy Med Name: Levothyroxine Sodium Oral Tablet 50 MCG] 30 tablet 0     Sig: TAKE 1 TABLET BY MOUTH EVERY DAY       Last Visit Date (If Applicable):  00/24/8590    Next Visit Date:    Visit date not found

## 2021-08-02 PROBLEM — Z95.810 AICD (AUTOMATIC CARDIOVERTER/DEFIBRILLATOR) PRESENT: Status: ACTIVE | Noted: 2021-01-01

## 2021-08-02 PROBLEM — R06.02 SHORTNESS OF BREATH: Status: ACTIVE | Noted: 2021-01-01

## 2021-08-02 NOTE — ED PROVIDER NOTES
[facility]  ED  Emergency Department Encounter  EmergencyMedicine Resident     Pt Name:Julien Guillory  MRN: [id number]  [place] 1935  Date of evaluation: 8/2/21  PCP:  Amada Sloan MD    [address] [place] [address]       Chief Complaint   Patient presents with    Shortness of Breath       HISTORY OF PRESENT ILLNESS  (Location/Symptom, Timing/Onset, Context/Setting, Quality, Duration, Modifying Factors, Severity.)      Frank Fuentes is a 82 y.o. male who presents with a week of progressive worsening shortness of breath, worse over the last 24 to 48 hours. Patient brought in by EMS, states patient was saturating in the 70s on room air and was placed on nonrebreather with improvement to the high 80s. Patient denies any history of lung disease, however on his history he does note that he has COPD and asthma. He does have an extensive cardiac history to include CABG. Patient not on any diuretics at home per patient and patient's family provided history. He does have an AICD, which has not gone off anytime recently. He denies any chest pain, recent illness to include fevers or chills. PAST MEDICAL / SURGICAL / SOCIAL / FAMILY HISTORY      has a past medical history of Abnormal tilt table test, Anxiety, Arthritis, Asthma, Bradycardia, CAD (coronary artery disease), CHF (congestive heart failure) (HCC), Chronic systolic (congestive) heart failure (HCC), COPD (chronic obstructive pulmonary disease) ([address] DVT (deep vein thrombosis) in pregnancy, Frequent PVCs, GERD (gastroesophageal reflux disease), Heart attack ([address] Hx of blood clots, Hyperlipidemia, Hypertension, Kidney stones, Neurocardiogenic syncope, On prednisone therapy, KENDELL (obstructive sleep apnea), Poor historian, Renal insufficiency, SOB (shortness of breath), Syncopal episodes, and Thyroid disease. has a past surgical history that includes Coronary angioplasty with stent (2003);  Colonoscopy; Diagnostic Cardiac Cath Lab Procedure (2005 - 2014); Cystocopy; Cystocopy (Right, 10/06/2014); Cystocopy (2019); Cystoscopy (N/A, 2019); cystoscopy w biopsy of bladder (N/A, 2020); pacemaker placement; Cardiac surgery (2000); Cardiac defibrillator placement (2017); Cardiac defibrillator placement (2017); Cardiac defibrillator placement (2017); Cystoscopy (N/A, 2021); and Lithotripsy (Right, 2021). Social History     Socioeconomic History    Marital status:      Spouse name: Not on file    Number of children: 2    Years of education: Not on file    Highest education level: Not on file   Occupational History     Employer: [employer]   Tobacco Use    Smoking status: Former Smoker     Quit date: 1997     Years since quittin.7    Smokeless tobacco: Current User     Types: Chew   Vaping Use    Vaping Use: Never used   Substance and Sexual Activity    Alcohol use: No     Comment: socially    Drug use: No    Sexual activity: Not on file   Other Topics Concern    Not on file   Social History Narrative    Not on file     Social Determinants of Health     Financial Resource Strain:     Difficulty of Paying Living Expenses:    Food Insecurity:     Worried About Running Out of Food in the Last Year:     [address] in the Last Year:    Transportation Needs:     Lack of Transportation (Medical):      Lack of Transportation (Non-Medical):    Physical Activity:     Days of Exercise per Week:     Minutes of Exercise per Session:    Stress:     Feeling of Stress :    Social Connections:     Frequency of Communication with Friends and Family:     Frequency of Social Gatherings with Friends and Family:     Attends Mu-ism Services:     Active Member of Clubs or Organizations:     Attends Club or Organization Meetings:     Marital Status:    Intimate Partner Violence:     Fear of Current or Ex-Partner:     Emotionally Abused:     Physically Abused:     Sexually Abused:        Family History   Problem Relation Age of Onset    Heart Attack Father        Allergies:  Patient has no known allergies. Home Medications:  Prior to Admission medications    Medication Sig Start Date End Date Taking? Authorizing Provider   levothyroxine (SYNTHROID) 50 MCG tablet TAKE 1 TABLET BY MOUTH EVERY DAY  4/12/21   Sebastián Leavitt MD   citalopram (CELEXA) 20 MG tablet TAKE 1 TABLET BY MOUTH EVERY DAY  4/12/21   Sebastián Leavitt MD   losartan (COZAAR) 25 MG tablet TAKE 1 TABLET BY MOUTH ONE TIME A DAY 11/14/19   Historical Provider, MD   meclizine (ANTIVERT) 25 MG tablet Take 25 mg by mouth as needed for Dizziness     Historical Provider, MD   metoprolol tartrate (LOPRESSOR) 25 MG tablet Take 25 mg by mouth nightly  10/17/16   Sebastián Leavitt MD   allopurinol (ZYLOPRIM) 100 MG tablet daily Indications: TAKES DAILY  11/6/15   Historical Provider, MD   predniSONE (DELTASONE) 1 MG tablet Take 3 mg by mouth daily     Historical Provider, MD   aspirin 81 MG tablet Take 81 mg by mouth daily. Historical Provider, MD   nitroGLYCERIN (NITROSTAT) 0.4 MG SL tablet Place 0.4 mg under the tongue every 5 minutes as needed for Chest pain. Historical Provider, MD       REVIEW OF SYSTEMS    (2-9 systems for level 4, 10 or more for level 5)      Review of Systems   Constitutional: Negative for chills, diaphoresis, fatigue and fever. HENT: Negative for congestion and rhinorrhea. Eyes: Negative for photophobia and visual disturbance. Respiratory: Positive for cough and shortness of breath. Negative for chest tightness and wheezing. Cardiovascular: Positive for palpitations. Negative for chest pain and leg swelling. Gastrointestinal: Positive for nausea. Negative for abdominal pain, constipation, diarrhea and vomiting. Endocrine: Negative for polydipsia, polyphagia and polyuria. Genitourinary: Negative for difficulty urinating, dysuria, flank pain and hematuria. Musculoskeletal: Negative for arthralgias, back pain, neck pain and neck stiffness. Skin: Negative for color change and rash. Neurological: Positive for light-headedness. Negative for dizziness, weakness, numbness and headaches. Psychiatric/Behavioral: Negative for agitation and behavioral problems. PHYSICAL EXAM   (up to 7 for level 4, 8 or more for level 5)      INITIAL VITALS:   BP (!) 154/76   Pulse 118   Temp 97.8 °F (36.6 °C) (Oral)   Resp (!) 32   Wt 178 lb (80.7 kg)   SpO2 90%   BMI 24.14 kg/m²     Physical Exam  Vitals and nursing note reviewed. Constitutional:       General: He is not in acute distress. Appearance: He is well-developed. He is not diaphoretic. HENT:      Head: Normocephalic and atraumatic. Eyes:      Conjunctiva/sclera: Conjunctivae normal.      Pupils: Pupils are equal, round, and reactive to light. Neck:      Vascular: No JVD. Trachea: No tracheal deviation. Cardiovascular:      Rate and Rhythm: Regular rhythm. Tachycardia present. Pulses: Normal pulses. Pulmonary:      Effort: Tachypnea and accessory muscle usage present. No respiratory distress. Breath sounds: Examination of the right-lower field reveals rhonchi. Examination of the left-lower field reveals rhonchi. Rhonchi present. No wheezing or rales. Chest:      Chest wall: No tenderness. Abdominal:      General: Bowel sounds are normal. There is no distension. Palpations: Abdomen is soft. Tenderness: There is no abdominal tenderness. There is no guarding. Musculoskeletal:         General: Normal range of motion. Cervical back: Normal range of motion and neck supple. Right lower leg: No edema. Left lower leg: No edema. Skin:     General: Skin is warm and dry. Capillary Refill: Capillary refill takes less than 2 seconds. Coloration: Skin is not pale. Findings: No erythema or rash. Neurological:      General: No focal deficit present. Mental Status: He is alert and oriented to person, place, and time. Cranial Nerves: No cranial nerve deficit. Psychiatric:         Behavior: Behavior normal.         DIFFERENTIAL  DIAGNOSIS     PLAN (LABS / IMAGING / EKG):  Orders Placed This Encounter   Procedures    Culture, Blood 1    Culture, Blood 2    Culture, Urine    COVID-19, Rapid    CT CHEST PULMONARY EMBOLISM W CONTRAST    CBC Auto Differential    Troponin    Brain Natriuretic Peptide    Protime-INR    APTT    Comprehensive Metabolic Panel w/ Reflex to MG    Lactate, Sepsis    BLOOD GAS, VENOUS    Amylase    Lipase    Urinalysis    ELECTROLYTES PLUS    Hemoglobin and hematocrit, blood    CALCIUM, IONIC (POC)    Troponin    CBC    APTT    Basic Metabolic Panel w/ Reflex to MG    CBC auto differential    Troponin    Microscopic Urinalysis    Diet NPO    Vital signs per unit routine    Notify physician    Up as tolerated    Up with assistance    Full Code    Inpatient consult to Cardiology    Inpatient consult to Internal Medicine    Inpatient consult to Case Management    OT eval and treat    PT evaluation and treat    BIPAP    Initiate Oxygen Therapy Protocol    Pacer Interrogate    Venous Blood Gas, POC    Creatinine W/GFR Point of Care    POCT urea (BUN)    Lactic Acid, POC    POCT Glucose    EKG 12 Lead    PATIENT STATUS (FROM ED OR OR/PROCEDURAL) Inpatient       MEDICATIONS ORDERED:  Orders Placed This Encounter   Medications    aspirin chewable tablet 324 mg    cefTRIAXone (ROCEPHIN) 1000 mg IVPB in 50 mL D5W minibag     Order Specific Question:   Antimicrobial Indications     Answer: Other     Order Specific Question:   Other Abx Indication     Answer: Suspected Sepsis of Pulmonary Origin - Community Acquired    azithromycin (ZITHROMAX) 500 mg in dextrose 5% 250 mL IVPB     Order Specific Question:   Antimicrobial Indications     Answer:    Other     Order Specific Question:   Other Abx Indication     Answer: Suspected Sepsis of Pulmonary Origin - Community Acquired    iopamidol (ISOVUE-370) 76 % injection 75 mL    heparin (porcine) injection 4,000 Units    heparin (porcine) injection 4,000 Units    heparin (porcine) injection 2,000 Units    heparin 25,000 units in dextrose 5% 250 mL (premix) infusion    furosemide (LASIX) injection 20 mg    aspirin chewable tablet 81 mg    predniSONE (DELTASONE) tablet 3 mg    losartan (COZAAR) tablet 25 mg    levothyroxine (SYNTHROID) tablet 50 mcg    0.9 % sodium chloride infusion    OR Linked Order Group     ondansetron (ZOFRAN-ODT) disintegrating tablet 4 mg     ondansetron (ZOFRAN) injection 4 mg    polyethylene glycol (GLYCOLAX) packet 17 g    OR Linked Order Group     acetaminophen (TYLENOL) tablet 650 mg     acetaminophen (TYLENOL) suppository 650 mg    furosemide (LASIX) injection 20 mg       DDX: ACS, CHF, pneumonia, pneumonitis, pneumothorax, COPD, asthma, PE, Covid, viral URI    MDM/IMPRESSION: This is a 28-year-old male presenting with acute shortness of cough, worsening over the past week. He does have history of CHF with an EF of 30-35% several years ago. AICD in place, no shocks, will interrogate. Also plan for cardiac work-up, and CT pulmonary was not given recent diagnosis of bladder cancer and acute hypoxia with shortness of breath. Patient does meet SIRS criteria, so sepsis orders placed, with suspected lung source. Will treat with Rocephin and azithromycin. Will hold off on 30 cc/kg bolus given suspicion for acute pulmonary edema secondary to CHF. Patient denies any chest pain, ACS still having differential given age and significant risk factors. 2 troponins ordered. Patient will require admission, at this time it is unclear the exact etiology of patient's fever, however suspect cardiac origin.     DIAGNOSTIC RESULTS / EMERGENCY DEPARTMENT COURSE / MDM   LAB RESULTS:  Results for orders placed or performed during the hospital encounter of 08/02/21   Culture, Blood 1    Specimen: Blood   Result Value Ref Range    Specimen Description . BLOOD     Special Requests L HA 4ML     Culture NO GROWTH 1 HOUR    Culture, Blood 2    Specimen: Blood   Result Value Ref Range    Specimen Description . BLOOD     Special Requests L ANTECUBITAL 10ML     Culture NO GROWTH 2 HOURS    COVID-19, Rapid    Specimen: Nasopharyngeal Swab   Result Value Ref Range    Specimen Description . NASOPHARYNGEAL SWAB     SARS-CoV-2, Rapid Not Detected Not Detected   CBC Auto Differential   Result Value Ref Range    WBC 18.4 (H) 3.5 - 11.3 k/uL    RBC 4.80 4.21 - 5.77 m/uL    Hemoglobin 14.7 13.0 - 17.0 g/dL    Hematocrit 46.6 40.7 - 50.3 %    MCV 97.1 82.6 - 102.9 fL    MCH 30.6 25.2 - 33.5 pg    MCHC 31.5 28.4 - 34.8 g/dL    RDW 15.3 (H) 11.8 - 14.4 %    Platelets 875 572 - 556 k/uL    MPV 9.8 8.1 - 13.5 fL    NRBC Automated 0.0 0.0 per 100 WBC    Differential Type NOT REPORTED     Seg Neutrophils 86 (H) 36 - 65 %    Lymphocytes 6 (L) 24 - 43 %    Monocytes 7 3 - 12 %    Eosinophils % 0 (L) 1 - 4 %    Basophils 0 0 - 2 %    Immature Granulocytes 1 (H) 0 %    Segs Absolute 15.89 (H) 1.50 - 8.10 k/uL    Absolute Lymph # 1.06 (L) 1.10 - 3.70 k/uL    Absolute Mono # 1.24 (H) 0.10 - 1.20 k/uL    Absolute Eos # 0.03 0.00 - 0.44 k/uL    Basophils Absolute 0.06 0.00 - 0.20 k/uL    Absolute Immature Granulocyte 0.13 0.00 - 0.30 k/uL    WBC Morphology NOT REPORTED     RBC Morphology ANISOCYTOSIS PRESENT     Platelet Estimate NOT REPORTED    Troponin   Result Value Ref Range    Troponin, High Sensitivity 269 (HH) 0 - 22 ng/L    Troponin T NOT REPORTED <0.03 ng/mL    Troponin Interp NOT REPORTED    Brain Natriuretic Peptide   Result Value Ref Range    Pro-BNP 4,625 (H) <300 pg/mL    BNP Interpretation Pro-BNP Reference Range:    Protime-INR   Result Value Ref Range    Protime 12.1 9.1 - 12.3 sec    INR 1.1    APTT   Result Value Ref Range    PTT 23.6 20.5 - 30.5 sec Comprehensive Metabolic Panel w/ Reflex to MG   Result Value Ref Range    Glucose 124 (H) 70 - 99 mg/dL    BUN 24 (H) 8 - 23 mg/dL    CREATININE 1.37 (H) 0.70 - 1.20 mg/dL    Bun/Cre Ratio NOT REPORTED 9 - 20    Calcium 9.0 8.6 - 10.4 mg/dL    Sodium 140 135 - 144 mmol/L    Potassium 4.3 3.7 - 5.3 mmol/L    Chloride 106 98 - 107 mmol/L    CO2 17 (L) 20 - 31 mmol/L    Anion Gap 17 9 - 17 mmol/L    Alkaline Phosphatase 123 40 - 129 U/L    ALT 11 5 - 41 U/L    AST 33 <40 U/L    Total Bilirubin 1.22 (H) 0.3 - 1.2 mg/dL    Total Protein 6.7 6.4 - 8.3 g/dL    Albumin 3.2 (L) 3.5 - 5.2 g/dL    Albumin/Globulin Ratio 0.9 (L) 1.0 - 2.5    GFR Non- 50 (L) >60 mL/min    GFR African American >60 >60 mL/min    GFR Comment          GFR Staging NOT REPORTED    Lactate, Sepsis   Result Value Ref Range    Lactic Acid, Sepsis NOT REPORTED 0.5 - 1.9 mmol/L    Lactic Acid, Sepsis, Whole Blood 6.5 (H) 0.5 - 1.9 mmol/L   Lactate, Sepsis   Result Value Ref Range    Lactic Acid, Sepsis NOT REPORTED 0.5 - 1.9 mmol/L    Lactic Acid, Sepsis, Whole Blood 3.4 (H) 0.5 - 1.9 mmol/L   BLOOD GAS, VENOUS   Result Value Ref Range    pH, Guillermo 7.382 7.320 - 7.420    pCO2, Guillermo 33.4 (L) 39 - 55    pO2, Guillermo 25.4 (L) 30 - 50    HCO3, Venous 19.4 (L) 24 - 30 mmol/L    Positive Base Excess, Guillermo NOT REPORTED 0.0 - 2.0 mmol/L    Negative Base Excess, Guillermo 4.2 (H) 0.0 - 2.0 mmol/L    O2 Sat, Guillermo 40.9 (L) 60.0 - 85.0 %    Total Hb NOT REPORTED 12.0 - 16.0 g/dl    Oxyhemoglobin NOT REPORTED 95.0 - 98.0 %    Carboxyhemoglobin 2.1 0 - 5 %    Methemoglobin NOT REPORTED 0.0 - 1.5 %    Pt Temp 37.0     pH, Guillermo, Temp Adj NOT REPORTED 7.320 - 7.420    pCO2, Guillermo, Temp Adj NOT REPORTED 39 - 55 mmHg    pO2, Guillermo, Temp Adj NOT REPORTED 30 - 50 mmHg    O2 Device/Flow/% NOT REPORTED     Respiratory Rate NOT REPORTED     Timo Test NOT REPORTED     Sample Site NOT REPORTED     Pt.  Position NOT REPORTED     Mode NOT REPORTED     Set Rate NOT REPORTED     Total Rate NOT REPORTED     VT NOT REPORTED     FIO2 INFORMATION NOT PROVIDED     Peep/Cpap NOT REPORTED     PSV NOT REPORTED     Text for Respiratory NOT REPORTED     NOTIFICATION NOT REPORTED     NOTIFICATION TIME NOT REPORTED    Amylase   Result Value Ref Range    Amylase 17 (L) 28 - 100 U/L   Lipase   Result Value Ref Range    Lipase 9 (L) 13 - 60 U/L   Urinalysis   Result Value Ref Range    Color, UA YELLOW YELLOW    Turbidity UA CLEAR CLEAR    Glucose, Ur NEGATIVE NEGATIVE    Bilirubin Urine NEGATIVE NEGATIVE    Ketones, Urine NEGATIVE NEGATIVE    Specific Gravity, UA 1.032 (H) 1.005 - 1.030    Urine Hgb LARGE (A) NEGATIVE    pH, UA 5.5 5.0 - 8.0    Protein, UA 1+ (A) NEGATIVE    Urobilinogen, Urine Normal Normal    Nitrite, Urine NEGATIVE NEGATIVE    Leukocyte Esterase, Urine SMALL (A) NEGATIVE    Urinalysis Comments NOT REPORTED    ELECTROLYTES PLUS   Result Value Ref Range    POC Sodium 144 138 - 146 mmol/L    POC Potassium 4.4 3.5 - 4.5 mmol/L    POC Chloride 112 (H) 98 - 107 mmol/L    POC TCO2 20 (L) 22 - 30 mmol/L    Anion Gap 13 7 - 16 mmol/L   Hemoglobin and hematocrit, blood   Result Value Ref Range    POC Hemoglobin 16.7 13.5 - 17.5 g/dL    POC Hematocrit 49 41 - 53 %   CALCIUM, IONIC (POC)   Result Value Ref Range    POC Ionized Calcium 1.15 1.15 - 1.33 mmol/L   Troponin   Result Value Ref Range    Troponin, High Sensitivity 300 (HH) 0 - 22 ng/L    Troponin T NOT REPORTED <0.03 ng/mL    Troponin Interp NOT REPORTED    Microscopic Urinalysis   Result Value Ref Range    -          WBC, UA 50  0 - 5 /HPF    RBC, UA 5 TO 10 0 - 4 /HPF    Casts UA  0 - 8 /LPF     5 TO 10 HYALINE Reference range defined for non-centrifuged specimen.     Crystals, UA NOT REPORTED None /HPF    Epithelial Cells UA 0 TO 2 0 - 5 /HPF    Renal Epithelial, UA NOT REPORTED 0 /HPF    Bacteria, UA NOT REPORTED None    Mucus, UA NOT REPORTED None    Trichomonas, UA NOT REPORTED None    Amorphous, UA NOT REPORTED None    Other Observations UA NOT REPORTED NOT REQ. Yeast, UA NOT REPORTED None   Troponin   Result Value Ref Range    Troponin, High Sensitivity 415 (HH) 0 - 22 ng/L    Troponin T NOT REPORTED <0.03 ng/mL    Troponin Interp NOT REPORTED    Venous Blood Gas, POC   Result Value Ref Range    pH, Guillermo 7.432 (H) 7.320 - 7.430    pCO2, Guillermo 28.8 (L) 41.0 - 51.0 mm Hg    pO2, Guillermo 19.2 (L) 30 - 50 mm Hg    HCO3, Venous 19.2 (L) 22.0 - 29.0 mmol/L    Total CO2, Venous NOT REPORTED 23.0 - 30.0 mmol/L    Negative Base Excess, Guillermo 4 (H) 0.0 - 2.0    Positive Base Excess, Guillermo NOT REPORTED 0.0 - 3.0    O2 Sat, Guillermo 33 (L) 60.0 - 85.0 %    O2 Device/Flow/% NOT REPORTED     Timo Test NOT REPORTED     Sample Site NOT REPORTED     Mode NOT REPORTED     FIO2 NOT REPORTED     Pt Temp NOT REPORTED     POC pH Temp NOT REPORTED     POC pCO2 Temp NOT REPORTED mm Hg    POC pO2 Temp NOT REPORTED mm Hg   Creatinine W/GFR Point of Care   Result Value Ref Range    POC Creatinine 1.45 (H) 0.51 - 1.19 mg/dL    GFR Comment 56 (L) >60 mL/min    GFR Non- 46 (L) >60 mL/min    GFR Comment         POCT urea (BUN)   Result Value Ref Range    POC BUN 25 8 - 26 mg/dL   Lactic Acid, POC   Result Value Ref Range    POC Lactic Acid 6.00 (H) 0.56 - 1.39 mmol/L   POCT Glucose   Result Value Ref Range    POC Glucose 138 (H) 74 - 100 mg/dL         RADIOLOGY:  CT CHEST PULMONARY EMBOLISM W CONTRAST   Final Result   No evidence of pulmonary embolism. Extensive pulmonary abnormalities, more right-sided as described above. 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 Small pleural effusions, more right-sided and extending along the   right major fissure. Mild left heart chamber enlargement. trend troponins, trend troponins, echo    [JG]      ED Course User Index  [JG] Lana Britt DO        PROCEDURES:      CONSULTS:  IP CONSULT TO CARDIOLOGY  IP CONSULT TO INTERNAL MEDICINE  IP CONSULT TO CASE MANAGEMENT    CRITICAL CARE:      FINAL IMPRESSION      1. Pneumonia of right lung due to infectious organism, unspecified part of lung    2. Acute on chronic congestive heart failure, unspecified heart failure type ([facility] [address]    3. NSTEMI (non-ST elevated myocardial infarction) ([facility] [address]          DISPOSITION / PLAN     DISPOSITION Admitted 08/02/2021 11:18:45 AM      PATIENT REFERRED TO:  No follow-up provider specified.     DISCHARGE MEDICATIONS:  New Prescriptions    No medications on file       Lana Britt DO  Emergency Medicine Resident    (Please note that portions of thisnote were completed with a voice recognition program.  Efforts were made to edit the dictations but occasionally words are mis-transcribed.)     Lana Britt DO  Resident  08/02/21 1938

## 2021-08-02 NOTE — CARE COORDINATION
Case Management Initial Discharge Plan  Carlee Moreno,             Spoke with daughter Abdon Lawrence over the phone to discuss discharge plans. Information verified: address, contacts, phone number, , insurance Yes  Insurance Provider: [facility]    Emergency Contact/Next of Kin name & number: Roxi Thurston [phone] and Kymberly Thomas [phone]    Who are involved in patient's support system? Children    PCP: Trent Aguilar MD  Date of last visit: Not sure      Discharge Planning    Living Arrangements:        Home has 1 story  2 stairs to climb to get into front door    Patient able to perform ADL's:Independent but not motivated lately after bladder Ca diagnosis    Current Services (outpatient & in home) none  DME equipment: n/a  DME provider: n/a    Is patient receiving oral anticoagulation therapy? No    Potential Assistance Needed:       Patient agreeable to home care: if needed  Freedom of choice provided:  no    Prior SNF/Rehab Placement and Facility: none  Agreeable to SNF/Rehab: No  [place]roll of choice provided: n/a     Evaluation: no    Expected Discharge date:       Patient expects to be discharged to: If home: is the family and/or caregiver wiling & able to provide support at home? yes  Who will be providing this support? children*    Follow Up Appointment: Best Day/ Time:      Transportation provider: children  Transportation arrangements needed for discharge: No    Readmission Risk              Risk of Unplanned Readmission:  14             Does patient have a readmission risk score greater than 14?: No  If yes, follow-up appointment must be made within 7 days of discharge.      Goals of Care: Improved breathing      Educated daughter on transitional options, provided freedom of choice and are agreeable with plan      Discharge Plan: Home with family suppoer-watch for home O2 needs          Electronically signed by Agustín Huynh RN on 21 at 6:18 PM EDT

## 2021-08-02 NOTE — ED PROVIDER NOTES
[address] [facility]     Emergency Department     Faculty Attestation    I performed a history and physical examination of the patient and discussed management with the resident. I reviewed the resident´s note and agree with the documented findings and plan of care. Any areas of disagreement are noted on the chart. I was personally present for the key portions of any procedures. I have documented in the chart those procedures where I was not present during the key portions. I have reviewed the emergency nurses triage note. I agree with the chief complaint, past medical history, past surgical history, allergies, medications, social and family history as documented unless otherwise noted below. For Physician Assistant/ Nurse Practitioner cases/documentation I have personally evaluated this patient and have completed at least one if not all key elements of the E/M (history, physical exam, and MDM). Additional findings are as noted. Mild respiratory distress, rales at the bases, heart regular rate and rhythm, abdomen soft nontender, no lower extremity pain or swelling on examination. Patient has recently been vaccinated for Covid. EKG Interpretation    Interpreted by emergency department physician    Rhythm: Atrial sensed ventricular paced  Rate: 117  Axis: Left axis -82 degrees  Ectopy: none  Conduction: Paced rhythm biventricular atrial sensed  ST Segments: no acute change  T Waves: no acute change  Q Waves: none    Clinical Impression: Paced rhythm    Darwin Hare, KAREN Hare MD  08/02/21 5640    CRITICAL CARE: There was a high probability of clinically significant/life threatening deterioration in this patient's condition which required my urgent intervention. Total critical care time was 30 minutes. This excludes any time for separately reportable procedures.           Darwin Hare MD  08/02/21 6022

## 2021-08-02 NOTE — ED NOTES
Bed: 19  Expected date:   Expected time:   Means of arrival:   Comments:  [id number] Georgia Carl RN  08/02/21 7222

## 2021-08-02 NOTE — H&P
[address]     Department of Internal Medicine - Staff Internal Medicine Teaching Service          ADMISSION NOTE/HISTORY AND PHYSICAL EXAMINATION   Date: 8/2/2021  Patient Name: Donna Lea  Date of admission: 8/2/2021  8:48 AM  YOB: 1935  PCP: Anika Evans MD  History Obtained From:  Patient and his son    CHIEF COMPLAINT     Chief complaint: shortness of breath    HISTORY OF PRESENTING ILLNESS     The patient is a pleasant 82 y.o. male  with a PMH of CAD s/p triple CABG (20 yrs ago), s/p stent placement (2014), bladder cancer, HTN, s/p defibrillator placement (01/2017), COPD, arthritis and thyroid disease presents today to the ED with SOB for 3 days. It is exertional, relieves with rest. It is not associated with cough, or chest pain. He denies any orthopnea and PND. Prior to these, the patient has not been eating properly for a week, he does not feel hungry, and reports that he has lost around 10 lbs in the past week. Pt also c/o fever last night of around 100.4 for which he took tylenol. Afebrile on evaluation here. He was diagnosed with bladder cancer on 4/2021 and has been depressed since then. He had an appointment scheduled today with urology for further plan of treatment. Pertinent labs were ordered. Troponins: 269-> 300->415  Pro-BNP:4,625  WBC: 18.4    Review of Systems:  Review of Systems   Constitutional: Positive for unexpected weight change. Negative for fatigue and fever. HENT: Negative for sinus pressure and sneezing. Eyes: Negative. Respiratory: Positive for shortness of breath. Negative for cough and wheezing. Cardiovascular: Negative for chest pain. Gastrointestinal: Negative for abdominal pain, anal bleeding and diarrhea. Genitourinary: Negative for flank pain and frequency. Musculoskeletal: Negative for myalgias. Neurological: Negative for light-headedness, numbness and headaches.    Psychiatric/Behavioral: Negative for confusion, hallucinations and suicidal ideas. PAST MEDICAL HISTORY     Past Medical History:   Diagnosis Date    Abnormal tilt table test 08/2016    POSITIVE AFTER SYNCOPAL EPISODE    Anxiety     Arthritis     Asthma     mild intermittent w/o complication    Bradycardia     CAD (coronary artery disease)     CHF (congestive heart failure) (HCC)     Chronic systolic (congestive) heart failure (HCC)     COPD (chronic obstructive pulmonary disease) (HCC)     DVT (deep vein thrombosis) in pregnancy 10/2014    EVIDENCE OF LT FEMORAL AND POPLITEAL DVT / STARTED ON ANTICOAGULATION     Frequent PVCs     GERD (gastroesophageal reflux disease)     Heart attack ([address] 2000    Hx of blood clots     clot in left leg 3 years ago    Hyperlipidemia     Hypertension     Kidney stones 2009? patient states passed    Neurocardiogenic syncope     On prednisone therapy     KENDELL (obstructive sleep apnea)     MILD KENDELL B SLEEP SUDY    Poor historian     Renal insufficiency     SOB (shortness of breath)     Syncopal episodes     Thyroid disease     hypothyroidism       PAST SURGICAL HISTORY     Past Surgical History:   Procedure Laterality Date    CARDIAC DEFIBRILLATOR PLACEMENT  01/30/2017    BI-V ICD DR Marcia Ascencio    CARDIAC DEFIBRILLATOR PLACEMENT  01/30/2017    CRT    CARDIAC DEFIBRILLATOR PLACEMENT  01/30/2017    CRT    CARDIAC SURGERY  7/12/2000    CABG X3 LIMA-LAD, SVG-DIAG DBG-RCA DR. Hansa Woodson      CORONARY ANGIOPLASTY WITH STENT PLACEMENT  2003    Leonides Hernandez Corpus    CYSTOSCOPY Right 10/06/2014    cysto with stent    CYSTOSCOPY  05/07/2019    transurethral resection bladder by Dr. Viet Lawrence 5/7/2019    CYSTOSCOPY TRANSURETHRAL RESECTION BLADDER performed by Adwoa Herrera MD at [address] [place] [address] 4/2/2021    CYSTOSCOPY, DILITATION, CAUTERIZATION OF PROSTATE VARICES.  performed by Adwoa Herrera MD at [facility]    CYSTOSCOPY W BIOPSY OF BLADDER N/A 2020    CYSTOSCOPY performed by Newton Castelan MD at [facility] CATH LAB PROCEDURE  2005 - 2014    BOTH TIMES SHOWING ALL 3 GRAFTS PATENT WITH OCCLUDED LCX / ATTEMPTED PCI TO LCX WAS UNSUCCESSFUL    LITHOTRIPSY Right 2021    CYSTOSCOPY BILATERAL PYELOGRAM WITH  BLADDER BIOPSY AND CAUTERIZATION performed by Newton Castelan MD at [facility]     Patient has no known allergies. MEDICATIONS PRIOR TO ADMISSION     Prior to Admission medications    Medication Sig Start Date End Date Taking? Authorizing Provider   levothyroxine (SYNTHROID) 50 MCG tablet TAKE 1 TABLET BY MOUTH EVERY DAY  21   Anika Evans MD   citalopram (CELEXA) 20 MG tablet TAKE 1 TABLET BY MOUTH EVERY DAY  21   Anika Evans MD   losartan (COZAAR) 25 MG tablet TAKE 1 TABLET BY MOUTH ONE TIME A DAY 19   Historical Provider, MD   meclizine (ANTIVERT) 25 MG tablet Take 25 mg by mouth as needed for Dizziness     Historical Provider, MD   metoprolol tartrate (LOPRESSOR) 25 MG tablet Take 25 mg by mouth nightly  10/17/16   Anika Evans MD   allopurinol (ZYLOPRIM) 100 MG tablet daily Indications: TAKES DAILY  11/6/15   Historical Provider, MD   predniSONE (DELTASONE) 1 MG tablet Take 3 mg by mouth daily     Historical Provider, MD   aspirin 81 MG tablet Take 81 mg by mouth daily. Historical Provider, MD   nitroGLYCERIN (NITROSTAT) 0.4 MG SL tablet Place 0.4 mg under the tongue every 5 minutes as needed for Chest pain.     Historical Provider, MD       SOCIAL HISTORY     Tobacco: negative  Alcohol: negative     FAMILY HISTORY     Family History   Problem Relation Age of Onset    Heart Attack Father        PHYSICAL EXAM     Vitals: BP (!) 142/69   Pulse 104   Temp 97.4 °F (36.3 °C) (Oral)   Resp (!) 33   Wt 178 lb (80.7 kg)   SpO2 90%   BMI 24.14 kg/m²   Tmax: Temp (24hrs), Av.6 °F (36.4 °C), Min:97.4 °F (36.3 °C), Max:97.8 °F (36.6 °C)    Last Body weight:   Wt Readings from Last 3 Encounters:   08/02/21 178 lb (80.7 kg)   06/29/21 178 lb (80.7 kg)   04/02/21 179 lb 1.6 oz (81.2 kg)     Body Mass Index : Body mass index is 24.14 kg/m². PHYSICAL EXAMINATION:  Physical Exam  Vitals reviewed. Constitutional:       General: He is not in acute distress. Appearance: Normal appearance. HENT:      Head: Normocephalic. Cardiovascular:      Rate and Rhythm: Normal rate. Pulses: Normal pulses. Heart sounds: Normal heart sounds. Pulmonary:      Effort: Pulmonary effort is normal.      Breath sounds: Normal breath sounds and air entry. Abdominal:      General: Bowel sounds are normal.      Palpations: Abdomen is soft. Musculoskeletal:      Right lower leg: No swelling. Left lower leg: No swelling. Skin:     General: Skin is warm. Neurological:      Mental Status: He is alert. Psychiatric:         Behavior: Behavior is cooperative. INVESTIGATIONS     Laboratory Testing:     Recent Results (from the past 24 hour(s))   Culture, Blood 2    Collection Time: 08/02/21  8:58 AM    Specimen: Blood   Result Value Ref Range    Specimen Description . BLOOD     Special Requests L ANTECUBITAL 10ML     Culture NO GROWTH 8 HOURS    CBC Auto Differential    Collection Time: 08/02/21  8:59 AM   Result Value Ref Range    WBC 18.4 (H) 3.5 - 11.3 k/uL    RBC 4.80 4.21 - 5.77 m/uL    Hemoglobin 14.7 13.0 - 17.0 g/dL    Hematocrit 46.6 40.7 - 50.3 %    MCV 97.1 82.6 - 102.9 fL    MCH 30.6 25.2 - 33.5 pg    MCHC 31.5 28.4 - 34.8 g/dL    RDW 15.3 (H) 11.8 - 14.4 %    Platelets 422 109 - 596 k/uL    MPV 9.8 8.1 - 13.5 fL    NRBC Automated 0.0 0.0 per 100 WBC    Differential Type NOT REPORTED     Seg Neutrophils 86 (H) 36 - 65 %    Lymphocytes 6 (L) 24 - 43 %    Monocytes 7 3 - 12 %    Eosinophils % 0 (L) 1 - 4 %    Basophils 0 0 - 2 %    Immature Granulocytes 1 (H) 0 %    Segs Absolute 15.89 (H) 1.50 - 8.10 k/uL    Absolute Lymph # 1.06 (L) 1.10 - 3.70 k/uL    Absolute Mono # 1.24 (H) 0.10 - 1.20 k/uL    Absolute Eos # 0.03 0.00 - 0.44 k/uL    Basophils Absolute 0.06 0.00 - 0.20 k/uL    Absolute Immature Granulocyte 0.13 0.00 - 0.30 k/uL    WBC Morphology NOT REPORTED     RBC Morphology ANISOCYTOSIS PRESENT     Platelet Estimate NOT REPORTED    Troponin    Collection Time: 08/02/21  8:59 AM   Result Value Ref Range    Troponin, High Sensitivity 269 (HH) 0 - 22 ng/L    Troponin T NOT REPORTED <0.03 ng/mL    Troponin Interp NOT REPORTED    Brain Natriuretic Peptide    Collection Time: 08/02/21  8:59 AM   Result Value Ref Range    Pro-BNP 4,625 (H) <300 pg/mL    BNP Interpretation Pro-BNP Reference Range:    Protime-INR    Collection Time: 08/02/21  8:59 AM   Result Value Ref Range    Protime 12.1 9.1 - 12.3 sec    INR 1.1    APTT    Collection Time: 08/02/21  8:59 AM   Result Value Ref Range    PTT 23.6 20.5 - 30.5 sec   Comprehensive Metabolic Panel w/ Reflex to MG    Collection Time: 08/02/21  8:59 AM   Result Value Ref Range    Glucose 124 (H) 70 - 99 mg/dL    BUN 24 (H) 8 - 23 mg/dL    CREATININE 1.37 (H) 0.70 - 1.20 mg/dL    Bun/Cre Ratio NOT REPORTED 9 - 20    Calcium 9.0 8.6 - 10.4 mg/dL    Sodium 140 135 - 144 mmol/L    Potassium 4.3 3.7 - 5.3 mmol/L    Chloride 106 98 - 107 mmol/L    CO2 17 (L) 20 - 31 mmol/L    Anion Gap 17 9 - 17 mmol/L    Alkaline Phosphatase 123 40 - 129 U/L    ALT 11 5 - 41 U/L    AST 33 <40 U/L    Total Bilirubin 1.22 (H) 0.3 - 1.2 mg/dL    Total Protein 6.7 6.4 - 8.3 g/dL    Albumin 3.2 (L) 3.5 - 5.2 g/dL    Albumin/Globulin Ratio 0.9 (L) 1.0 - 2.5    GFR Non- 50 (L) >60 mL/min    GFR African American >60 >60 mL/min    GFR Comment          GFR Staging NOT REPORTED    Lactate, Sepsis    Collection Time: 08/02/21  8:59 AM   Result Value Ref Range    Lactic Acid, Sepsis NOT REPORTED 0.5 - 1.9 mmol/L    Lactic Acid, Sepsis, Whole Blood 6.5 (H) 0.5 - 1.9 mmol/L   BLOOD GAS, VENOUS    Collection Time: 08/02/21  8:59 AM   Result Value Ref Range    pH, Guillermo 7.382 7.320 - 7.420    pCO2, Guillermo 33.4 (L) 39 - 55    pO2, Guillermo 25.4 (L) 30 - 50    HCO3, Venous 19.4 (L) 24 - 30 mmol/L    Positive Base Excess, Guillermo NOT REPORTED 0.0 - 2.0 mmol/L    Negative Base Excess, Guillermo 4.2 (H) 0.0 - 2.0 mmol/L    O2 Sat, Guillermo 40.9 (L) 60.0 - 85.0 %    Total Hb NOT REPORTED 12.0 - 16.0 g/dl    Oxyhemoglobin NOT REPORTED 95.0 - 98.0 %    Carboxyhemoglobin 2.1 0 - 5 %    Methemoglobin NOT REPORTED 0.0 - 1.5 %    Pt Temp 37.0     pH, Guillermo, Temp Adj NOT REPORTED 7.320 - 7.420    pCO2, Guillermo, Temp Adj NOT REPORTED 39 - 55 mmHg    pO2, Guillermo, Temp Adj NOT REPORTED 30 - 50 mmHg    O2 Device/Flow/% NOT REPORTED     Respiratory Rate NOT REPORTED     Timo Test NOT REPORTED     Sample Site NOT REPORTED     Pt.  Position NOT REPORTED     Mode NOT REPORTED     Set Rate NOT REPORTED     Total Rate NOT REPORTED     VT NOT REPORTED     FIO2 INFORMATION NOT PROVIDED     Peep/Cpap NOT REPORTED     PSV NOT REPORTED     Text for Respiratory NOT REPORTED     NOTIFICATION NOT REPORTED     NOTIFICATION TIME NOT REPORTED    Amylase    Collection Time: 08/02/21  8:59 AM   Result Value Ref Range    Amylase 17 (L) 28 - 100 U/L   Lipase    Collection Time: 08/02/21  8:59 AM   Result Value Ref Range    Lipase 9 (L) 13 - 60 U/L   Venous Blood Gas, POC    Collection Time: 08/02/21  9:00 AM   Result Value Ref Range    pH, Guillermo 7.432 (H) 7.320 - 7.430    pCO2, Guillermo 28.8 (L) 41.0 - 51.0 mm Hg    pO2, Guillermo 19.2 (L) 30 - 50 mm Hg    HCO3, Venous 19.2 (L) 22.0 - 29.0 mmol/L    Total CO2, Venous NOT REPORTED 23.0 - 30.0 mmol/L    Negative Base Excess, Guillermo 4 (H) 0.0 - 2.0    Positive Base Excess, Guillermo NOT REPORTED 0.0 - 3.0    O2 Sat, Guillermo 33 (L) 60.0 - 85.0 %    O2 Device/Flow/% NOT REPORTED     Timo Test NOT REPORTED     Sample Site NOT REPORTED     Mode NOT REPORTED     FIO2 NOT REPORTED     Pt Temp NOT REPORTED     POC pH Temp NOT REPORTED     POC pCO2 Temp NOT REPORTED mm Hg    POC pO2 Temp NOT REPORTED mm Hg   ELECTROLYTES PLUS    Collection Time: 08/02/21  9:00 AM   Result Value Ref Range    POC Sodium 144 138 - 146 mmol/L    POC Potassium 4.4 3.5 - 4.5 mmol/L    POC Chloride 112 (H) 98 - 107 mmol/L    POC TCO2 20 (L) 22 - 30 mmol/L    Anion Gap 13 7 - 16 mmol/L   Hemoglobin and hematocrit, blood    Collection Time: 08/02/21  9:00 AM   Result Value Ref Range    POC Hemoglobin 16.7 13.5 - 17.5 g/dL    POC Hematocrit 49 41 - 53 %   Creatinine W/GFR Point of Care    Collection Time: 08/02/21  9:00 AM   Result Value Ref Range    POC Creatinine 1.45 (H) 0.51 - 1.19 mg/dL    GFR Comment 56 (L) >60 mL/min    GFR Non- 46 (L) >60 mL/min    GFR Comment         CALCIUM, IONIC (POC)    Collection Time: 08/02/21  9:00 AM   Result Value Ref Range    POC Ionized Calcium 1.15 1.15 - 1.33 mmol/L   POCT urea (BUN)    Collection Time: 08/02/21  9:00 AM   Result Value Ref Range    POC BUN 25 8 - 26 mg/dL   Lactic Acid, POC    Collection Time: 08/02/21  9:00 AM   Result Value Ref Range    POC Lactic Acid 6.00 (H) 0.56 - 1.39 mmol/L   POCT Glucose    Collection Time: 08/02/21  9:00 AM   Result Value Ref Range    POC Glucose 138 (H) 74 - 100 mg/dL   COVID-19, Rapid    Collection Time: 08/02/21  9:06 AM    Specimen: Nasopharyngeal Swab   Result Value Ref Range    Specimen Description . NASOPHARYNGEAL SWAB     SARS-CoV-2, Rapid Not Detected Not Detected   Culture, Blood 1    Collection Time: 08/02/21  9:10 AM    Specimen: Blood   Result Value Ref Range    Specimen Description . BLOOD     Special Requests L HA 4ML     Culture NO GROWTH 7 HOURS    Troponin    Collection Time: 08/02/21 10:16 AM   Result Value Ref Range    Troponin, High Sensitivity 300 (HH) 0 - 22 ng/L    Troponin T NOT REPORTED <0.03 ng/mL    Troponin Interp NOT REPORTED    Lactate, Sepsis    Collection Time: 08/02/21 10:57 AM   Result Value Ref Range    Lactic Acid, Sepsis NOT REPORTED 0.5 - 1.9 mmol/L Lactic Acid, Sepsis, Whole Blood 3.4 (H) 0.5 - 1.9 mmol/L   Urinalysis    Collection Time: 08/02/21 11:47 AM   Result Value Ref Range    Color, UA YELLOW YELLOW    Turbidity UA CLEAR CLEAR    Glucose, Ur NEGATIVE NEGATIVE    Bilirubin Urine NEGATIVE NEGATIVE    Ketones, Urine NEGATIVE NEGATIVE    Specific Gravity, UA 1.032 (H) 1.005 - 1.030    Urine Hgb LARGE (A) NEGATIVE    pH, UA 5.5 5.0 - 8.0    Protein, UA 1+ (A) NEGATIVE    Urobilinogen, Urine Normal Normal    Nitrite, Urine NEGATIVE NEGATIVE    Leukocyte Esterase, Urine SMALL (A) NEGATIVE    Urinalysis Comments NOT REPORTED    Microscopic Urinalysis    Collection Time: 08/02/21 11:47 AM   Result Value Ref Range    -          WBC, UA 50  0 - 5 /HPF    RBC, UA 5 TO 10 0 - 4 /HPF    Casts UA  0 - 8 /LPF     5 TO 10 HYALINE Reference range defined for non-centrifuged specimen. Crystals, UA NOT REPORTED None /HPF    Epithelial Cells UA 0 TO 2 0 - 5 /HPF    Renal Epithelial, UA NOT REPORTED 0 /HPF    Bacteria, UA NOT REPORTED None    Mucus, UA NOT REPORTED None    Trichomonas, UA NOT REPORTED None    Amorphous, UA NOT REPORTED None    Other Observations UA NOT REPORTED NOT REQ. Yeast, UA NOT REPORTED None   Troponin    Collection Time: 08/02/21  1:01 PM   Result Value Ref Range    Troponin, High Sensitivity 415 (HH) 0 - 22 ng/L    Troponin T NOT REPORTED <0.03 ng/mL    Troponin Interp NOT REPORTED    APTT    Collection Time: 08/02/21  3:10 PM   Result Value Ref Range    PTT 41.7 (H) 20.5 - 30.5 sec       Imaging:   CT CHEST PULMONARY EMBOLISM W CONTRAST    Result Date: 8/2/2021  No evidence of pulmonary embolism. Extensive pulmonary abnormalities, more right-sided as described above.  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 Small pleural effusions, more right-sided and extending along the right major fissure. Mild left heart chamber enlargement. No pericardial effusion. 1.9 cm exophytic left posterior renal lesion, not clearly cystic. Initial correlation with ultrasound recommended. Additional tiny high density cortical lesion on the right. Probable nonobstructing kidney stones. Low density focus within the lumen of the stomach, not clearly fluid. Intraluminal lipoma or other polypoid lesion possible. Small hiatus hernia. Upper GI or endoscopy should be considered. Prior granulomatous disease. Additional likely incidental findings, as detailed in the body of the report above. RECOMMENDATIONS: Pulmonology consultation. ASSESSMENT & PLAN     ASSESSMENT / PLAN:       Acute on chronic systolic heart failure  - Echo done in 2016 showed EF of 30-35%. - Pt has an AICD in place, pacer interrogation ordered. - Monitor strict I/O   - Started on IV furosemide 20mg BID   - Daily BMPs   - Get echocardiogram    Acute respiratory failure secondary to heart failure  - currently on BiPAP. - Maintain O2 sat above 92%. - CT done on arrival to the ED it showed mild left heart chamber enlargement, extensive pulmonary abnormalities, small pleural effusions, no evidence of pulmonary embolism. - Pulmonology consulted. NSTEMI   -Troponins have been trending up 269-> 300->415  - Continue to monitor troponins   - Cardio consulted. Leucocytosis suspected due to a lung source  - WBC: 18.4   - CT done on arrival to the ED it showed mild left heart chamber enlargement, extensive pulmonary abnormalities, small pleural effusions, no evidence of pulmonary embolism. - Lactic acid: 2.4. Will continue to monitor.   - Ordered blood cultures and urine culture. Coronary artery disease  - pt is s/p CABG x3 2009   - pt had stent placement in 2014   - On ASA 81mg at home   - Continue ASA.      Hypothyroidism  - On levothyroxine 50mcg at home   - continue here     Essential Hypertension  - Pt at home with Losartan 25mg   - Restart tomorrow after checking BMP. Lalitha Salguero MD  PGY-1, Internal Medicine Resident  [facility], [place]         8/2/2021, 5:18 PM    I have discussed the care of Daril Breath , including pertinent history and exam findings,    today with the resident. I have seen and examined the patient and the key elements of all parts of the encounter have been performed by me . I agree with the assessment, plan and orders as documented by the resident. Active Problems:    AICD (automatic cardioverter/defibrillator) present    Shortness of breath    DVT (deep vein thrombosis) in pregnancy  Resolved Problems:    * No resolved hospital problems. *        Overall  course ;                                   are improving over time.         Patient has multiple medical problem  Coronary artery disease  CHF s/p AICD  CKD  History of bladder cancer  Will wean BiPAP  Continue with IV diuretics  Patient had recent instrumentation  Patient has spiked fever, will continue with Rocephin and azithromycin  Awaiting cultures            Electronically signed by Marlene Yousif MD

## 2021-08-02 NOTE — ED NOTES
Pt to ED with complaints of shortness of breath. Pt states he has been having increased difficulty breathing over the past week. Pt with belly breathing and with sats in the 80s. Pt has hx of CHF with pacemaker present. Pt states \"I just don't feel well\". Pt placed on full cardiac monitor. EKG obtained. IV established. Dr. Cristal iDallo at bedside.      Thu Wolfe, SHAYNE  08/02/21 9933

## 2021-08-02 NOTE — PROGRESS NOTES
Pt received from ED on NRB mask-Pt short of breath with exertion-o2 sats low 90's and high 80's-pt.'s daughter at bedside

## 2021-08-02 NOTE — ED NOTES
ED to inpatient nurses report    Chief Complaint   Patient presents with    Shortness of Breath      Present to ED from Home  LOC: alert and orientated to name, place, date  Vital signs   Vitals:    08/02/21 0854 08/02/21 0859 08/02/21 0911   BP: (!) 154/76     Pulse: 118     Resp: (!) 39  (!) 32   Temp:  97.8 °F (36.6 °C)    TempSrc:  Oral    SpO2:  (!) 80% 90%   Weight: 178 lb (80.7 kg)        Oxygen Baseline Room air    Current needs required Bipap/NRB   LDAs:   Peripheral IV 08/02/21 Right Antecubital (Active)       Peripheral IV 08/02/21 Left Antecubital (Active)   Site Assessment Clean;Dry; Intact 08/02/21 0857   Line Status Brisk blood return;Normal saline locked 08/02/21 0857   Dressing Status Dry;Clean; Intact 08/02/21 0857   Dressing Intervention New 08/02/21 0857     Mobility: Requires assistance * 1  Pending ED orders: None  Present condition: Stable, upset he is NPO and peeing so much  Code Status: FULL   Consults:  []  Hospitalist  Completed  [] yes [] no  []  Medicine  Completed  [] yes [] No  []  Cardiology  Completed  [] yes [] No  []  GI   Completed  [] yes [] No  []  Neurology  Completed  [] yes [] No  []  Nephrology Completed  [] yes [] No  []  Vascular  Completed  [] yes [] No   []  Surgery  Completed  [] yes [] No   []  Urology  Completed  [] yes [] No   []  Plastics  Completed  [] yes [] No   []  ENT  Completed  [] yes [] No   []  Other   Completed  [] yes [] No  Pertinent event(s) See note  Pertinent event(s) See note  Electronically signed by Idalmis Norman RN on 8/2/2021 at 1:19 PM    Pt with increased SOB over past week, calling it [facility]  Full septic workup, received rocephin and Zithromax IV  Got 20 of lasix, he is very upset he is urinating so much but does use the urinal  He did/does not tolerate the bipap well so he is on NRB at this time and is much more comfortable with that  Trops are elevated so they are doing serial trops and have him on heparin, next ptt due at  in the low 100s, RR in the 30s, afebrile, sats in low 90s on NRB, high 90s on bipap   Family is very supportive and present  Sabrina Roche RN  08/02/21 1015 [place] Jacky, RN  08/02/21 1323

## 2021-08-02 NOTE — PLAN OF CARE
Problem: Pain:  Goal: Pain level will decrease  Description: Pain level will decrease  Outcome: Ongoing  Goal: Control of acute pain  Description: Control of acute pain  Outcome: Ongoing  Goal: Control of chronic pain  Description: Control of chronic pain  Outcome: Ongoing     Problem: Nutritional:  Goal: Nutritional status will improve  Description: Nutritional status will improve  Outcome: Ongoing     Problem: Physical Regulation:  Goal: Diagnostic test results will improve  Description: Diagnostic test results will improve  Outcome: Ongoing  Goal: Will remain free from infection  Description: Will remain free from infection  Outcome: Ongoing  Goal: Ability to maintain vital signs within normal range will improve  Description: Ability to maintain vital signs within normal range will improve  Outcome: Ongoing     Problem: Respiratory:  Goal: Ability to maintain normal respiratory secretions will improve  Description: Ability to maintain normal respiratory secretions will improve  Outcome: Ongoing     Problem: Skin Integrity:  Goal: Demonstration of wound healing without infection will improve  Description: Demonstration of wound healing without infection will improve  Outcome: Ongoing  Goal: Complications related to intravenous access or infusion will be avoided or minimized  Description: Complications related to intravenous access or infusion will be avoided or minimized  Outcome: Ongoing     Problem: OXYGENATION/RESPIRATORY FUNCTION  Goal: Patient will maintain patent airway  Outcome: Ongoing  Goal: Patient will achieve/maintain normal respiratory rate/effort  Description: Respiratory rate and effort will be within normal limits for the patient  Outcome: Ongoing     Problem: HEMODYNAMIC STATUS  Goal: Patient has stable vital signs and fluid balance  Outcome: Ongoing     Problem: FLUID AND ELECTROLYTE IMBALANCE  Goal: Fluid and electrolyte balance are achieved/maintained  Outcome: Ongoing     Problem: ACTIVITY INTOLERANCE/IMPAIRED MOBILITY  Goal: Mobility/activity is maintained at optimum level for patient  Outcome: Ongoing

## 2021-08-03 NOTE — CONSULTS
PULMONARY & CRITICAL CARE MEDICINE CONSULT NOTE     Patient:  Carlee Moreno  MRN: [id number]  Admit date: 8/2/2021  Primary Care Physician: Trent Aguilar MD  Consulting Physician: Jac Jurado MD  CODE Status: Full Code   LOS: 1    HISTORY     CHIEF COMPLAINT/REASON FOR CONSULT:    Interstitial lung disease/acute hypoxic respiratory failure. HISTORY OF PRESENT ILLNESS:  The patient is a 82 y.o. male he has history of coronary artery disease/status post CABG/chronic systolic heart failure, HFrEF, ischemic cardiomyopathy status post AICD, chronic kidney disease, history of smoking but no definite prior diagnosis of COPD per patient, history of asbestos exposure and according to patient was told in the past about asbestos affected the lung but he was never diagnosed with fibrosis of lung. Not on home oxygen. According to patient he has been having worsening shortness of breath for last 2 weeks he does not think that he had dyspnea shortness of breath about a month ago. He does not have cough. Denies wheezing. He does have chronic orthopnea without much change. He does not have chest pain and denies pedal edema. He does not have hemoptysis and does not have sputum production. He shortness of breath got acutely worse and he presented to emergency room found to be hypoxic. Venous blood gas was consistent with respiratory alkalosis and metabolic acidosis. His proBNP was elevated more than 4000. His troponins were elevated more than 500.   He had a CT scan of the chest done which showed bilateral groundglass changes but much more prominent on the right side and asymmetric along with interburst fibrotic changes groundglass changes/pulmonary filtrate much more right greater than the left along with traction bronchiectatic changes not limited to lower lung field or peripheral.  He has mild bilateral pleural effusion present present mild likely honeycomb changes present on the right but difficult because of DR Waldron Hamman Dr. Gearline Fanning    CYSTOSCOPY Right 10/06/2014    cysto with stent    CYSTOSCOPY  05/07/2019    transurethral resection bladder by Dr. Avni Turner 5/7/2019    CYSTOSCOPY TRANSURETHRAL RESECTION BLADDER performed by Joss Aguirre MD at [facility] [address] [place] [address] 4/2/2021    CYSTOSCOPY, DILITATION, CAUTERIZATION OF PROSTATE VARICES. performed by Joss Aguirre MD at [address] N/A 8/21/2020    CYSTOSCOPY performed by Joss Aguirre MD at [facility] CATH LAB PROCEDURE  11/2005 - 2/2014    BOTH TIMES SHOWING ALL 3 GRAFTS PATENT WITH OCCLUDED LCX / ATTEMPTED PCI TO LCX WAS UNSUCCESSFUL    LITHOTRIPSY Right 6/29/2021    CYSTOSCOPY BILATERAL PYELOGRAM WITH  BLADDER BIOPSY AND CAUTERIZATION performed by Joss Aguirre MD at [facility] HISTORY:       Problem Relation Age of Onset    Heart Attack Father      SOCIAL HISTORY:   TOBACCO:   reports that he quit smoking about 23 years ago. His smokeless tobacco use includes chew. ETOH:  reports no history of alcohol use. DRUGS: reports no history of drug use. AVOCATION/OCCUPATIONAL EXPOSURE:    The patient reports asbestos. The patient denies  to having pet dogs, cats, turtles or exotic birds at home. There is no history of TB or TB exposure. There is no exposure to sick contacts. Travel history is not significant history of risk factors for pulmonary disease. The patient denies using Hot Tubs. ALLERGIES:    No Known Allergies      HOME MEDICATIONS:  Prior to Admission medications    Medication Sig Start Date End Date Taking?  Authorizing Provider   levothyroxine (SYNTHROID) 50 MCG tablet TAKE 1 TABLET BY MOUTH EVERY DAY  4/12/21   Shilpa Cash MD   citalopram (CELEXA) 20 MG tablet TAKE 1 TABLET BY MOUTH EVERY DAY  4/12/21   Shilpa Cash MD   losartan (COZAAR) 25 MG tablet TAKE 1 TABLET BY MOUTH ONE TIME A DAY 11/14/19 Historical Provider, MD   meclizine (ANTIVERT) 25 MG tablet Take 25 mg by mouth as needed for Dizziness     Historical Provider, MD   metoprolol tartrate (LOPRESSOR) 25 MG tablet Take 25 mg by mouth nightly  10/17/16   Prosper Parsons MD   allopurinol (ZYLOPRIM) 100 MG tablet daily Indications: TAKES DAILY  11/6/15   Historical Provider, MD   predniSONE (DELTASONE) 1 MG tablet Take 3 mg by mouth daily     Historical Provider, MD   aspirin 81 MG tablet Take 81 mg by mouth daily. Historical Provider, MD   nitroGLYCERIN (NITROSTAT) 0.4 MG SL tablet Place 0.4 mg under the tongue every 5 minutes as needed for Chest pain.     Historical Provider, MD     IMMUNIZATIONS:  Most Recent Immunizations   Administered Date(s) Administered    COVID-19, Aceves Peter, PF, 30mcg/0.3mL 02/12/2021    Influenza, Quadv, adjuvanted, 65 yrs +, IM, PF (Fluad) 11/25/2020    Pneumococcal Polysaccharide ([id number]) 11/25/2020       REVIEW OF SYSTEMS:  General: Positive for fatigue, negative for chills, fatigue or fever  ENT: negative for headaches, nasal congestion, sore throat or visual changes  Allergy and Immunology: negative for postnasal drip or seasonal allergies  Hematological and Lymphatic: negative for bleeding problems, swollen lymph nodes  Respiratory: positive for shortness of breath and tachypnea negative for cough, hemoptysis, sputum changes or wheezing  Cardiovascular: Positive for dyspnea, orthopnea, negative for chest pain edema or palpitations  Gastrointestinal: negative for abdominal pain, change in bowel habits or nausea/vomiting  Genito-Urinary: negative for dysuria or urinary frequency/urgency  Musculoskeletal: negative for joint pain or joint swelling  Neurological: negative for numbness/tingling, seizures or weakness  Dermatological: negative for pruritus or rash    PHYSICAL EXAMINATION     VITAL SIGNS:   LAST-  BP (!) 104/53   Pulse 100   Temp 99.5 °F (37.5 °C) (Axillary)   Resp (!) 32   Wt 178 lb (80.7 kg)   SpO2 93%   BMI 24.14 kg/m²   8-24 HR RANGE-  TEMP Temp  Av.7 °F (37.1 °C)  Min: 97.4 °F (36.3 °C)  Max: 101.6 °F (05.1 °C)   BP Systolic (85IOF), SJQ:484 , Min:104 , PFC:328      Diastolic (57ATA), YTR:44, Min:53, Max:115     PULSE Pulse  Av  Min: 96  Max: 107   RR Resp  Av.4  Min: 27  Max: 35   O2 SAT SpO2  Av.3 %  Min: 90 %  Max: 94 %   OXYGEN DELIVERY O2 Flow Rate (L/min)  Av L/min  Min: 30 L/min  Max: 30 L/min     SYSTEMIC EXAMINATION:    General appearance -chronically ill-appearing, tachypneic and mild respiratory distress   Mental status - alert, oriented to person, place, and time   Eyes - pupils equal and reactive, extraocular eye movements intact, sclera anicteric   Mouth - mucous membranes slightly dry, pharynx normal without lesions   Neck - supple, no significant adenopathy, carotids upstroke normal bilaterally, no bruits   Lymphatics - no palpable lymphadenopathy, no hepatosplenomegaly   Chest -he is tachypneic with mild respiratory distress, mild accessory muscles use, normal resonance on percussion, air entry is present bilaterally without expiratory wheezing and rhonchi. He has bilateral and expiratory dry crackles present.    Heart - normal rate, regular rhythm, normal S1, S2, no murmurs, rubs, clicks or gallops   Abdomen - soft, nontender, nondistended, no masses or organomegaly   Neurological - motor and sensory grossly normal bilaterally   Musculoskeletal - no joint tenderness, deformity or swelling   Extremities - peripheral pulses normal, no pedal edema, no clubbing or cyanosis   Skin - normal coloration and turgor, no rashes, no suspicious skin lesions noted    DATA REVIEW     Medications: Current Inpatient  Scheduled Meds:   cefTRIAXone (ROCEPHIN) IV  1,000 mg Intravenous Q24H    azithromycin  250 mg Intravenous Q24H    metoprolol tartrate  12.5 mg Oral Nightly    aspirin  81 mg Oral Daily    predniSONE  3 mg Oral Daily    levothyroxine  50 mcg Oral Daily    sodium chloride flush  5-40 mL Intravenous 2 times per day    furosemide  20 mg Intravenous BID    atorvastatin  40 mg Oral Nightly     Continuous Infusions:   heparin (PORCINE) Infusion 18 Units/kg/hr (08/03/21 1418)    sodium chloride       INPUT/OUTPUT:  In: 637 [P.O.:475; I.V.:162]  Out: 1000 [Urine:1000]    LABS:-  ABG:   No results for input(s): POCPH, POCPCO2, POCPO2, POCHCO3, YDAM1FDL in the last 72 hours. CBC:   Recent Labs     08/02/21  0859 08/03/21  0608   WBC 18.4* 16.4*   HGB 14.7 12.7*   HCT 46.6 40.6*   MCV 97.1 97.8    175   LYMPHOPCT 6* 6*   RBC 4.80 4.15*   MCH 30.6 30.6   MCHC 31.5 31.3   RDW 15.3* 15.3*     BMP:   Recent Labs     08/02/21  0859 08/02/21  0900 08/03/21  0608     --  141   K 4.3  --  4.1     --  107   CO2 17*  --  17*   BUN 24*  --  33*   CREATININE 1.37* 1.45* 1.73*   GLUCOSE 124*  --  99     Liver Function Test:   Recent Labs     08/02/21  0859   PROT 6.7   LABALBU 3.2*   ALT 11   AST 33   ALKPHOS 123   BILITOT 1.22*     Amylase/Lipase:  Recent Labs     08/02/21  0859   AMYLASE 17*   LIPASE 9*     Coagulation Profile:   Recent Labs     08/02/21  0859 08/02/21  1510 08/02/21  2230 08/03/21  0608 08/03/21  1300   INR 1.1  --   --   --   --    PROTIME 12.1  --   --   --   --    APTT 23.6   < > 36.5* 42.8* 52.8*    < > = values in this interval not displayed. Cardiac Enzymes:  No results for input(s): CKTOTAL, CKMB, CKMBINDEX, TROPONINI in the last 72 hours.   Lactic Acid:  No results found for: LACTA  BNP:   No results found for: BNP  D-Dimer:  No results found for: DDIMER  Others:   Lab Results   Component Value Date    TSH 5.35 (H) 08/03/2021     Lab Results   Component Value Date    ANNEMARIE POSITIVE (A) 01/31/2012    SEDRATE 4 04/30/2020    CRP 1.1 04/30/2020     Lab Results   Component Value Date    URICACID 5.0 04/30/2020     No results found for: IRON, TIBC, FERRITIN  No results found for: SPEP, UPEP  Lab Results   Component Value Date    PSA 0.31 12/03/2019     Microbiology:  Recent Labs     08/02/21  1147   [facility] . CLEAN CATCH URINE   SPECIAL NOT REPORTED   CULTURE NO SIGNIFICANT GROWTH     Pathology:    Radiology Reports:  CT CHEST PULMONARY EMBOLISM W CONTRAST   Final Result   No evidence of pulmonary embolism. Extensive pulmonary abnormalities, more right-sided as described above. 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 Small pleural effusions, more right-sided and extending along the   right major fissure. Mild left heart chamber enlargement. No pericardial effusion. 1.9 cm exophytic left posterior renal lesion, not clearly cystic. Initial   correlation with ultrasound recommended. Additional tiny high density   cortical lesion on the right. Probable nonobstructing kidney stones. Low density focus within the lumen of the stomach, not clearly fluid. Intraluminal lipoma or other polypoid lesion possible. Small hiatus hernia. Upper GI or endoscopy should be considered. Prior granulomatous disease. Additional likely incidental findings, as detailed in the body of the report   above. RECOMMENDATIONS:   Pulmonology consultation.          US RENAL COMPLETE    (Results Pending)       Pulmonary Function test:    Polysomnogram:    Echocardiogram:   Results for orders placed during the hospital encounter of 08/29/16    Echocardiogram complete 2D with doppler with color    Narrative  Transthoracic Echocardiography Report (TTE)    Patient Name Lynnette Cruz      Date of Study               08/31/2016  [place]    Date of      1935  Gender                      Male  Birth    Age          68 year(s)  Race                            Room Number 3022        Height:                     72 inch, 182.88 cm    Corporate ID [id number]  Weight:                     185 pounds, 83.9 kg  #    Patient Acct [de-identified]    BSA:          2.06 m^2      BMI:      25.09  #                                                              kg/m^2    MR #         [id number]     Glade Cogan    Accession #  [id number]   Interpreting Physician      Abigail Latif    Fellow                   Referring Nurse  Practitioner    Interpreting             Referring Physician         Jania Anthony,  Fellow    Type of Study    TTE procedure:2D Echocardiogram, M-Mode, Doppler, Color Doppler. Procedure Date  Date: 08/31/2016 Start: 11:27 AM    Study Location: [facility]    History / Jordana Cano. Comments:  Procedure explained to patient. Syncope and collapse  PMHx: CABG 1999/2014 Cardiac cath-patent grafts    Patient Status: Inpatient    Height: 72 inches Weight: 185 pounds BSA: 2.06 m^2 BMI: 25.09 kg/m^2    Allergies  - *No Known Allergies. CONCLUSIONS    Summary  Technically difficult study. Left ventricle is normal in size Global left ventricular systolic function  is moderate to severly reduced Estimated ejection fraction is 30-35 %  Abnormal E/A wave ratio on transmitral Doppler consistent with abnormal left  ventricular relaxation (diastolic dysfunction). Signature  ----------------------------------------------------------------------------  Electronically signed by Klaus Toscano on 08/31/2016 12:01  PM  ----------------------------------------------------------------------------    ----------------------------------------------------------------------------  Electronically signed by Ankur SalomonInterpreting physician) on  08/31/2016 07:42 PM  ----------------------------------------------------------------------------  FINDINGS  Left Atrium  Left atrium is normal in size.   Left Ventricle  Left ventricle is normal in size Global left ventricular systolic function  is moderate to severly reduced Estimated ejection fraction is 30-35 % . Abnormal E/A wave ratio on transmitral Doppler consistent with abnormal left  ventricular relaxation (diastolic dysfunction). The Inferobasal segment is Akinetic. Right Atrium  Right atrium is normal in size. Right Ventricle  Normal right ventricular size and function. Mitral Valve  Normal mitral valve leaflets. Mild mitral regurgitation. Aortic Valve  Aortic valve sclerosis without stenosis. Tricuspid Valve  Tricuspid valve leaflets appear to be normal.  Estimated right ventricular systolic pressure is 34 mmHg. Pericardial Effusion  No significant pericardial effusion is seen. Miscellaneous  Normal aortic root dimension. M-mode / 2D Measurements & Calculations:    LVIDd:4.52 cm(3.7 - 5.6 cm)      Diastolic FOBCHC:45.8 ml  KSSN:4.90 cm(0.6 - 1.1 cm)       Aortic Root:2.9 cm(2.0 - 3.7 cm)  LVPWd:0.81 cm(0.6 - 1.1 cm)      LA Dimension: 3.1 cm(1.9 - 4.0 cm)  LA volume/Index: 27.33 ml /13m^2    Mitral:                                  Aortic    Valve Area (P1/2-Time): 4.07 cm^2        Peak Velocity: 1.26 m/s  Peak E-Wave: 0.73 m/s                    Peak Gradient: 6.35 mmHg  Peak A-Wave: 0.67 m/s  E/A Ratio: 1.09  Peak Gradient: 2.11 mmHg  Mean Gradient: 2 mmHg  Deceleration Time: 187 msec  P1/2t: 54 msec    Mean Velocity: 0.56 m/s    Tricuspid:                               Pulmonic:    Estimated RVSP: 34 mmHg  Peak TR Velocity: 2.45 m/s  Peak TR Gradient: 24.01 mmHg  Estimated RA Pressure: 10 mmHg  Estimated PASP: 34.01 mmHg    Diastology / Tissue Doppler  Septal Wall E' velocity:0.07 m/s  Septal Wall E/E':10  Lateral Wall E' velocity:0.11 m/s  Lateral Wall E/E':6      Cardiac Catheterization:   No results found for this or any previous visit.       ASSESSMENT AND PLAN     Assessment:    //Bilateral groundglass infiltrate along with chronic fibrotic changes groundglass infiltrate is asymmetric in distribution much more on the right side mildly on the left side there is no specific distribution and in the upper and the lower lung and not necessarily peripheral or sub-pleural distribution along with small bilateral effusion. There is no doubt that he has likely chronic fibrotic changes he had a history of asbestos exposure and it is possible that he has asbestosis/fibrosis and he has overlying pulmonary edema from non-ST elevation MI/acute heart failure secondary to MI. He does not look like in acute CHF at this time but pulmonary edema is still possible. Along with those finding acute on chronic it will be difficult to determine if he has NSIP or other etiology of interstitial lung disease with exacerbation according to patient he has only 2 to 3 weeks history of acute worsening of shortness of breath. He is not a candidate for lung biopsy for diagnosis especially with acute cardiac events and issues and non-ST elevation MI and along with acute on chronic CHF. And would empirically treat with steroids and will see the response when cardiac work-up is in process. 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  //Non-ST elevation MI  //Small bilateral pleural effusion  //Coronary artery disease status post CABG  //Status post AICD  //History of nephrolithiasis  //Chronic steroid use for polymyalgia rheumatica on 3 mg prednisone.  //History of amiodarone use in the past.    Plan:    I personally interviewed/examined the patient; reviewed interval history, interpreted all available radiographic and laboratory data at the time of service. Patient is currently on high flow nasal cannula 80% and 30 L and saturating 90%.   We will continue with noninvasive ventilation at night and as needed during the daytime. We will try to wean high flow nasal cannula. As he is having DARYL on CKD would suggest to hold Lasix for now and follow-up renal function/creatinine tomorrow. Agree with renal ultrasound. We will start empirically IV Solu-Medrol and will see the response next few days. Recommend to call pharmacy to confirm that he is not on amiodarone. Also get records from cardiology office to determine if he is or not on amiodarone. Optimization of CHF treatment per cardiology. On heparin drip, statin and aspirin and low-dose beta-blocker. On Rocephin and Zithromax will recommend to continue. Check urine for Legionella antigen. Respiratory viral panel. Repeat chest x-ray tomorrow  Once acute issues improve will get HRCT supine and prone as currently he had groundglass changes and small bilateral effusions. Continue to monitor I/O with a goal of even/negative fluid balance  Antimicrobials reviewed; continue Rocephin and Zithromax  DVT prophylaxis on heparin drip  Physical/occupational therapy; increase activity as tolerated. I updated the patient regarding the current clinical condition, provisional diagnosis and management plan. He verbalized a clear understanding and I addressed his concerns, and answered all questions to the best of my abilities. It was my pleasure to evaluate Sandi Vázquez today. We will continue to follow. I would like to thank you for allowing me to participate in the care of this patient. Please feel free to call with any further questions or concerns. Christiana Fajardo MD.   Pulmonary and Critical Care Medicine           8/3/2021, 3:04 PM    Please note that this chart was generated using voice recognition Dragon dictation software. Although every effort was made to ensure the accuracy of this automated transcription, some errors in transcription may have occurred.

## 2021-08-03 NOTE — PLAN OF CARE
Problem: Pain:  Goal: Control of acute pain  Description: Control of acute pain  Outcome: Ongoing     Problem: Skin Integrity:  Goal: Will show no infection signs and symptoms  Description: Will show no infection signs and symptoms  Outcome: Ongoing     Problem: Falls - Risk of:  Goal: Will remain free from falls  Description: Will remain free from falls  Outcome: Ongoing

## 2021-08-03 NOTE — PROGRESS NOTES
Physical Therapy        Physical Therapy Cancel Note      DATE: 8/3/2021    NAME: Kristofer Pickett  MRN: [id number]   : 1935      Patient not seen this date for Physical Therapy due to:     Other: troponins elevated, await cardiology plan      Electronically signed by Deb Rey PT on 8/3/2021 at 10:55 AM

## 2021-08-03 NOTE — PLAN OF CARE
Problem: Respiratory  Intervention: Respiratory assessment  Note:   PROVIDE ADEQUATE OXYGENATION WITH ACCEPTABLE SP02/ABG'S    [x]  IDENTIFY APPROPRIATE OXYGEN THERAPY  [x]   MONITOR SP02/ABG'S AS NEEDED   [x]   PATIENT EDUCATION AS NEEDED     [x]  NON INVASIVE VENTILATION  [x]  PROVIDE OPTIMAL VENTILATION/ACCEPTABLE SP02  []  IMPLEMENT NON INVASIVE VENTILATION PROTOCOL  [x]  ASSESSMENT SKIN INTEGRITY  [x]  PATIENT EDUCATION AS NEEDED  [x]  BIPAP AS NEEDED

## 2021-08-03 NOTE — PROGRESS NOTES
Writer notified cardiology via perfect serve of the up-trend in troponin from [phone]. Will continue to monitor.   Electronically signed by Avril Deshpande RN on 8/2/2021 at 11:34 PM

## 2021-08-03 NOTE — PROGRESS NOTES
Afebrile on evaluation here.       He was diagnosed with bladder cancer on 2021 and has been depressed since then. He had an appointment scheduled today with urology for further plan of treatment.      Pertinent labs were ordered.      Troponins: 269-> 300->415  Pro-BNP:4,625  WBC: 18.4    OBJECTIVE     Vital Signs:  /66   Pulse 96   Temp 98.9 °F (37.2 °C) (Temporal)   Resp 28   Wt 178 lb (80.7 kg)   SpO2 97%   BMI 24.14 kg/m²     Temp (24hrs), Av °F (36.7 °C), Min:97.4 °F (36.3 °C), Max:98.9 °F (37.2 °C)    In: -   Out: 250 [Urine:250]    Physical Exam:  Vitals reviewed. Constitutional:       General: He is not in acute distress. Appearance: Normal appearance. HENT:      Head: Normocephalic. Cardiovascular:      Rate and Rhythm: Normal rate. Pulses: Normal pulses. Heart sounds: Normal heart sounds. Pulmonary:      Effort: Pulmonary effort is normal.      Breath sounds: Normal breath sounds and air entry. Abdominal:      General: Bowel sounds are normal.      Palpations: Abdomen is soft. Musculoskeletal:      Right lower leg: No swelling. Left lower leg: No swelling. Skin:     General: Skin is warm. Neurological:      Mental Status: He is alert. Psychiatric:         Behavior: Behavior is cooperative.        Medications:  Scheduled Medications:    aspirin  81 mg Oral Daily    predniSONE  3 mg Oral Daily    [Held by provider] losartan  25 mg Oral Daily    levothyroxine  50 mcg Oral Daily    sodium chloride flush  5-40 mL Intravenous 2 times per day    furosemide  20 mg Intravenous BID    atorvastatin  40 mg Oral Nightly     Continuous Infusions:    heparin (PORCINE) Infusion 16 Units/kg/hr (21 3473)    sodium chloride       PRN Medicationsheparin (porcine), 4,000 Units, PRN  heparin (porcine), 2,000 Units, PRN  sodium chloride flush, 5-40 mL, PRN  sodium chloride, 25 mL, PRN  ondansetron, 4 mg, Q8H PRN   Or  ondansetron, 4 mg, Q6H PRN  polyethylene glycol, 17 g, Daily PRN  acetaminophen, 650 mg, Q6H PRN   Or  acetaminophen, 650 mg, Q6H PRN        Diagnostic Labs:  CBC:   Recent Labs     08/02/21  0859   WBC 18.4*   RBC 4.80   HGB 14.7   HCT 46.6   MCV 97.1   RDW 15.3*        BMP:   Recent Labs     08/02/21  0859 08/02/21  0900     --    K 4.3  --      --    CO2 17*  --    BUN 24*  --    CREATININE 1.37* 1.45*     BNP: No results for input(s): BNP in the last 72 hours. PT/INR:   Recent Labs     08/02/21  0859   PROTIME 12.1   INR 1.1     APTT:   Recent Labs     08/02/21  1510 08/02/21  1938 08/02/21  2230   APTT 41.7* 37.4* 36.5*     CARDIAC ENZYMES: No results for input(s): CKMB, CKMBINDEX, TROPONINI in the last 72 hours. Invalid input(s): CKTOTAL;3  FASTING LIPID PANEL:  Lab Results   Component Value Date    CHOL 198 04/04/2019    HDL 50 04/04/2019    TRIG 69 04/04/2019     LIVER PROFILE:   Recent Labs     08/02/21  0859   AST 33   ALT 11   BILITOT 1.22*   ALKPHOS 123      MICROBIOLOGY:   Lab Results   Component Value Date/Time    CULTURE NO GROWTH 14 HOURS 08/02/2021 09:10 AM       Imaging:    CT CHEST PULMONARY EMBOLISM W CONTRAST    Result Date: 8/2/2021  No evidence of pulmonary embolism. Extensive pulmonary abnormalities, more right-sided as described above. 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 Small pleural effusions, more right-sided and extending along the right major fissure. Mild left heart chamber enlargement. No pericardial effusion. 1.9 cm exophytic left posterior renal lesion, not clearly cystic. Initial correlation with ultrasound recommended. Additional tiny high density cortical lesion on the right. Probable nonobstructing kidney stones.  Low density focus within the lumen of the stomach, not clearly fluid. Intraluminal lipoma or other polypoid lesion possible. Small hiatus hernia. Upper GI or endoscopy should be considered. Prior granulomatous disease. Additional likely incidental findings, as detailed in the body of the report above. RECOMMENDATIONS: Pulmonology consultation. ASSESSMENT & PLAN     ASSESSMENT / PLAN:     Acute on chronic systolic heart failure  - Echo done in 2016 showed EF of 30-35%. - Pt has an AICD in place, pacer interrogation ordered. - Monitor strict I/O   - Started on IV furosemide 20mg BID   - Daily BMPs   - Get echocardiogram     Acute respiratory failure secondary to heart failure  - currently on BiPAP. - Maintain O2 sat above 92%. COVID- negative  - CT done on arrival to the ED it showed mild left heart chamber enlargement, extensive pulmonary abnormalities, small pleural effusions, no evidence of pulmonary embolism. - Pulmonology consulted.      NSTEMI   -Troponins have been trending up 269-> 300->415-> 686  - continue heparin drip. - Continue to monitor troponins   - Cardio consulted.      Leucocytosis suspected due to a lung source  - WBC: 18.4   - CT done on arrival to the ED it showed mild left heart chamber enlargement, extensive pulmonary abnormalities, small pleural effusions, no evidence of pulmonary embolism. - Lactic acid: 2.4. Will continue to monitor.   - order procalcitonin. - Urine culture showed no significant growth. Blood culture shows no growth at 23 hours. - continue Zithromax and Rocephin.      Coronary artery disease  - pt is s/p CABG x3 2009   - pt had stent placement in 2014   - On ASA 81mg at home   - Continue ASA.      Hypothyroidism  - On levothyroxine 50mcg at home   - continue here      Essential Hypertension  - Pt at home with Losartan 25mg   - Restarted Losartan.          Lucy Hernandez MD  PGY-1, Internal Medicine Resident  [facility]         8/3/2021, 5: 37 AM

## 2021-08-03 NOTE — PLAN OF CARE
Problem: Pain:  Description: Pain management should include both nonpharmacologic and pharmacologic interventions.   Goal: Pain level will decrease  Description: Pain level will decrease  8/3/2021 0458 by Bladimir Mcfadden RN  Outcome: Met This Shift     Problem: Nutritional:  Goal: Nutritional status will improve  Description: Nutritional status will improve  8/3/2021 0458 by Bladimir Mcfadden RN  Outcome: Ongoing

## 2021-08-03 NOTE — TELEPHONE ENCOUNTER
Kika Rush is calling to request a refill on the following medication(s):    Medication Request:  Requested Prescriptions     Pending Prescriptions Disp Refills    levothyroxine (SYNTHROID) 50 MCG tablet [Pharmacy Med Name: Levothyroxine Sodium Oral Tablet 50 MCG] 30 tablet 0     Sig: TAKE 1 TABLET BY MOUTH EVERY DAY       Last Visit Date (If Applicable):  46/47/4281    Next Visit Date:    Visit date not found

## 2021-08-03 NOTE — CONSULTS
Attestation signed by      Attending Physician Statement:    I have discussed the care of  Omari Duran , including pertinent history and exam findings, with the Cardiology fellow/resident. I have seen and examined the patient and the key elements of all parts of the encounter have been performed by me. I agree with the assessment, plan and orders as documented by the fellow/resident, after I modified exam findings and plan of treatments, and the final version is my approved version of the assessment. Additional Comments:   NSTEMI  CAD s/p CABG  Acute Resp failure  Ac on Chronic Sys HF, HFrEF, Ischem CM- S/p BIV AICD for CRT-D  COPD  DARYL/CKD  - Needs cardiac cath, however, cannot proceed with cath today due to rising in Cr and Resp failure due to pulm edema/CHF. - consult nephro for optimization prior to cardiac cath    Valentino Sibley 66, DO, [facility] [place], [address], [address], [place] [address] [facility]  [employer]  [phone]     [facility]   Admission Note                 Patient's name:  Richard Goel Record Number: [id number]  Patient's account/billing number: [de-identified]  Patient's YOB: 1935  Age: 82 y.o. Date of Admission: 8/2/2021  8:48 AM  Date of History and Physical Examination: 8/3/2021  Primary Care Physician: Kim Lopez MD    Code Status: Full Code    CHIEF COMPLAINT:    Chief Complaint   Patient presents with    Shortness of Breath       HISTORY OF PRESENT ILLNESS:      History was obtained from chart review, the patient and family, limited as the patient is on BiPAP. Omari Duran is a 82 y.o.   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 weight gain. HEENT: No visual disturbances, hearing disturbances, nasal drainage or neck swelling  Heart: Denies any chest pain, palpitations, PND,   Lungs: Has SOB, TOMPKINS,  Orthopnea, no cough, wheezing or pleurisy  Abdomen: Denies abdominal pain, nausea, vomiting, constipation, diarrhea or rectal  bleeding   Extremities: Denies any leg swelling or calf tenderness  Musculo skeletal: Denies any arthralgias, joint swelling  Skin: Denies any rash/skin changes  Hem/Onc: Denies bleeding gums, swollen lymph nodes  Endo: Denies polydypsia, polyphagia  Psychiatric: Denies any mood changes, agitation or psychosis    PHYSICAL EXAM:    Physical Examination:    Temperature:  Temp: 101.6 °F (38.7 °C)  Respirations:  Resp: 27  Pulse:   Pulse: 104  BP:    BP: 122/66    Constitutional and General Appearance: alert, cooperative, on BiPAP  HEENT: PERRL, no cervical lymphadenopathy. No masses palpable. Normal oral mucosa  Respiratory:  Normal excursion and expansion with use of accessory muscles  Resp Auscultation: Good respiratory effort. No for increased work of breathing. On auscultation: Diffuse bilateral crackles on lung exam  Cardiovascular: The apical impulse is not displaced  Heart auscultation: S1, S2 heard, no murmur, rubs or gallops  Jugular venous pulsation Elevated  The carotid upstroke is normal in amplitude and contour without delay or bruit  Peripheral pulses are symmetrical and full   Abdomen:  No masses or tenderness  Bowel sounds present  Extremities:   No Cyanosis or Clubbing   Lower extremity edema: Mild   Skin: Warm and dry  Neurological:  Alert and oriented.   Moves all extremities well  No abnormalities of mood, affect, memory, mentation, or behavior are noted    DATA:    Labs:   CBC:   Recent Labs     08/02/21  0859 08/03/21  0608   WBC 18.4* 16.4*   HGB 14.7 12.7*   HCT 46.6 40.6*    175 BMP:   Recent Labs     08/02/21  0859 08/02/21  0859 08/02/21  0900 08/03/21  0608     --   --  141   K 4.3  --   --  4.1   CO2 17*  --   --  17*   BUN 24*  --   --  33*   CREATININE 1.37*  --  1.45* 1.73*   LABGLOM 50*   < > 46* 38*   GLUCOSE 124*  --   --  99    < > = values in this interval not displayed. BNP: No results for input(s): BNP in the last 72 hours. PT/INR:   Recent Labs     08/02/21  0859   PROTIME 12.1   INR 1.1     APTT:  Recent Labs     08/02/21  2230 08/03/21  0608   APTT 36.5* 42.8*     CARDIAC ENZYMES:No results for input(s): CKTOTAL, CKMB, CKMBINDEX, TROPONINI in the last 72 hours. FASTING LIPID PANEL:  Lab Results   Component Value Date    HDL 50 04/04/2019    TRIG 69 04/04/2019     LIVER PROFILE:  Recent Labs     08/02/21  0859   AST 33   ALT 11   LABALBU 3.2*       Imaging  CT CHEST PULMONARY EMBOLISM W CONTRAST    Result Date: 8/2/2021  EXAMINATION: CTA OF THE CHEST 8/2/2021 10:39 am TECHNIQUE: CTA of the chest was performed after the administration of intravenous contrast.  Multiplanar reformatted images are provided for review. MIP images are provided for review. Dose modulation, iterative reconstruction, and/or weight based adjustment of the mA/kV was utilized to reduce the radiation dose to as low as reasonably achievable. COMPARISON: None. HISTORY: ORDERING SYSTEM PROVIDED HISTORY: shortness of breath, hypoxic TECHNOLOGIST PROVIDED HISTORY: shortness of breath, hypoxic Decision Support Exception - unselect if not a suspected or confirmed emergency medical condition->Emergency Medical Condition (MA) Reason for Exam: sob Type of Exam: Unknown FINDINGS: Pulmonary Arteries: Pulmonary arteries are adequately opacified for evaluation. No evidence of intraluminal filling defect to suggest pulmonary embolism. Main pulmonary artery is normal in caliber. Mediastinum: No evidence of mediastinal lymphadenopathy.   The heart and pericardium demonstrate no acute abnormality; considerable artifact from right heart AICD biventricular and atrial electrodes. Enlarged left heart chambers. There is no acute abnormality of the thoracic aorta. Right hilar and mediastinal, mostly carinal calcified nodes. Lungs/pleura: Extensive somewhat asymmetric more right-sided ground-glass opacities with reticulations and mild traction bronchiectasis, some peripheral honeycombing but no subpleural sparing. No evidence of pleural effusion or pneumothorax. Small pleural effusions, larger on the right with some loculated fluid extending along the major fissure. Upper Abdomen: Limited images of the upper abdomen show no acute abnormality; hepatic contour appears slightly lobular. Low-density 2.7 cm well-defined hypodensity in the stomach, not clearly fluid but measuring less than 0. Some residual food/debris in the duodenal bulb. Small hiatus hernia. Splenic calcifications. Some fatty replacement pancreatic body and head. Considerable calcific ASVD aorta and branch vessels. 1.9 cm exophytic lesion posterior left kidney, measuring 55 HU. Possible nonobstructing kidney stones. No hydronephrosis. Small high density right cortical renal lesion. Soft Tissues/Bones: No acute bone or soft tissue abnormality; severe osteopenia with extensive ossification anterior longitudinal ligament. No fracture. No evidence of pulmonary embolism. Extensive pulmonary abnormalities, more right-sided as described above. 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 Small pleural effusions, more right-sided and extending along the right major fissure. Mild left heart chamber enlargement. No pericardial effusion.  1.9 cm exophytic left posterior renal lesion, not clearly cystic. Initial correlation with ultrasound recommended. Additional tiny high density cortical lesion on the right. Probable nonobstructing kidney stones. Low density focus within the lumen of the stomach, not clearly fluid. Intraluminal lipoma or other polypoid lesion possible. Small hiatus hernia. Upper GI or endoscopy should be considered. Prior granulomatous disease. Additional likely incidental findings, as detailed in the body of the report above. RECOMMENDATIONS: Pulmonology consultation. Last EK2021 - Ventricular paced rhythm    Last Echo: 2016 - Technically difficult study. Left ventricle is normal in size Global left ventricular systolic function  is moderate to severly reduced Estimated ejection fraction is 30-35 %  Abnormal E/A wave ratio on transmitral Doppler consistent with abnormal left  ventricular relaxation (diastolic dysfunction). Last Stress test: 2016   Stable exam showing inferolateral infarct and a mildly decreased left ventricular ejection fraction of 47 percent. No evidence for pharmacologic stress-induced ischemia. LH:   Patent grafts and occluded LCx with collaterals, not amenable to PCI    Holter monitor 2016  1. Sinus rhythm. 2. Sinus bradycardia. 3. Sinus tachycardia. 4. Moderately frequent premature ventricular contractions with     couplets. 5. Ventricular triplets. 6. Episodes of R-on-T. IMPRESSION:    Active Problems:    AICD (automatic cardioverter/defibrillator) present    Shortness of breath    DVT (deep vein thrombosis) in pregnancy  Resolved Problems:    * No resolved hospital problems.  *      Acute on chronic hypoxic respiratory failure secondary to CHF exacerbation and possible pneumonia with concern for NSIP and COPD exacerbation  Acute on chronic systolic congestive heart failure  NSTEMI  CAD s/p CABG  H/O HFrEF s/p CRT-D in 2018  HTN  HLD  Bladder cancer  COPD  Hypothyroidism  Inflammatory arthropathy    RECOMMENDATIONS:  Considering h/o CABG and NSTEMI, patient would need cardiac cath, dicussed with the family and the patient and they are willing to proceed for the same. Paitent needs aggressive diuresis prior to the same for the patient to be able to lay flat. Recommend obtaining Nephrology consult for need for continued diuresis and contrast for cardiac cath and worsening renal function. Continue heparin gtt for now and trend troponin for 1 occurrence daily. Replace electrolytes to keep K>4 and Mg>2. Continue aspirin and lipitor. Start lopressor 12.5 mg twice daily. Rest of the management as per primary team.     Final plan and recommendation were made in conjunction with Dr Yi Putnam.      Braulio Flores MD, MD  Internal Medicine    [facility]) [facility]

## 2021-08-03 NOTE — PROGRESS NOTES
Occupational [address] [facility]  Occupational Therapy Not Seen Note    DATE: 8/3/2021  Name: Petey Francois  : 1935  MRN: [id number]    Patient not available for Occupational Therapy due to:    [] Testing:    [] Hemodialysis    [] Blood Transfusion in Progress    []Refusal by Patient:    [] Surgery/Procedure:    [] Strict Bedrest    [] Sedation    [] Spine Precautions     [] Pt with medical decline and not appropriate for continued therapy services. Spoke with pt/family and OT services to be defered. [] Pt independent with functional mobility and functional tasks. Pt with no OT acute care needs at this time, will defer OT eval.    [x] Other: Pts troponin levels increased from 415- 686. Await cardiology POC.     Itzel Morales OT OTR/L

## 2021-08-04 NOTE — PLAN OF CARE
Problem: Pain:  Goal: Pain level will decrease  Description: Pain level will decrease  Outcome: Ongoing  Goal: Control of acute pain  Description: Control of acute pain  Outcome: Ongoing     Problem: Physical Regulation:  Goal: Will remain free from infection  Description: Will remain free from infection  Outcome: Ongoing     Problem: Respiratory:  Goal: Ability to maintain normal respiratory secretions will improve  Description: Ability to maintain normal respiratory secretions will improve  Outcome: Ongoing     Problem: OXYGENATION/RESPIRATORY FUNCTION  Goal: Patient will maintain patent airway  Outcome: Ongoing

## 2021-08-04 NOTE — PROGRESS NOTES
[facility]  Progress Note                   Date:   8/4/2021  Patient name: Omari Duran  Date of admission:  8/2/2021  8:48 AM  MRN:   [id number]  YOB: 1935  PCP: Kim Lopez MD    Reason for Admission: Shortness of breath [R06.02]    Subjective:       Clinical Changes /Abnormalities: Seen & examined in room. No acute issues/concerns overnight. Labs, vitals, & tele reviewed. Review of Systems    Medications:   Scheduled Meds:   OLANZapine  5 mg Oral Nightly    bumetanide  2 mg Intravenous Q12H    cefTRIAXone (ROCEPHIN) IV  1,000 mg Intravenous Q24H    azithromycin  250 mg Intravenous Q24H    methylPREDNISolone  40 mg Intravenous Q12H    metoprolol tartrate  12.5 mg Oral BID    aspirin  81 mg Oral Daily    [Held by provider] predniSONE  3 mg Oral Daily    levothyroxine  50 mcg Oral Daily    sodium chloride flush  5-40 mL Intravenous 2 times per day    [Held by provider] furosemide  20 mg Intravenous BID    atorvastatin  40 mg Oral Nightly     Continuous Infusions:   heparin (PORCINE) Infusion 20 Units/kg/hr (08/03/21 2021)    sodium chloride       CBC:   Recent Labs     08/02/21  0859 08/03/21  0608 08/04/21  0330   WBC 18.4* 16.4* 17.3*   HGB 14.7 12.7* 13.3    175 181     BMP:    Recent Labs     08/02/21  0859 08/02/21  0900 08/03/21  0608 08/04/21  0330     --  141 137   K 4.3  --  4.1 3.9     --  107 102   CO2 17*  --  17* 18*   BUN 24*  --  33* 42*   CREATININE 1.37* 1.45* 1.73* 1.61*   GLUCOSE 124*  --  99 134*     Hepatic:  Recent Labs     08/02/21  0859   AST 33   ALT 11   BILITOT 1.22*   ALKPHOS 123     Troponin:   Recent Labs     08/02/21  2230 08/03/21  0608 08/03/21  1300   TROPHS 686* 527* 434*     BNP: No results for input(s): BNP in the last 72 hours. Lipids: No results for input(s): CHOL, HDL in the last 72 hours.     Invalid input(s): LDLCALCU  INR:   Recent Labs     08/02/21  0859   INR 1.1       Objective:   Vitals: BP 107/60   Pulse 89   Temp 98 °F (36.7 °C) (Oral)   Resp 22   Wt 178 lb (80.7 kg)   SpO2 92%   BMI 24.14 kg/m²   General appearance: alert and cooperative with exam  HEENT: Head: Normocephalic, no lesions, without obvious abnormality. Neck:no JVD, trachea midline, no adenopathy  Lungs: Clear to auscultation  Heart: Regular rate and rhythm, s1/s2 auscultated, no murmurs  Abdomen: soft, non-tender, bowel sounds active  Extremities: no edema  Neurologic: not done    Danielle Nelson is a 82 y.o.   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 chun)     Anxiety     CAD (coronary artery disease)     Hyperlipemia     Stented coronary artery     GERD (gastroesophageal reflux disease)     Ureteral calculus, right     IFG (impaired fasting glucose)     Renal insufficiency     Essential hypertension     Mobility impaired     Mild intermittent asthma without complication     S/P implantation of automatic cardioverter/defibrillator (AICD)-CRT 1/30/17 - Dr. Ricardo Spiritism     Bladder polyp     Pneumococcal vaccination declined     Influenza vaccination declined     Polymyalgia rheumatica ([address]     On prednisone therapy     Neurocardiogenic syncope     Hypothyroid     AICD (automatic cardioverter/defibrillator) present     Shortness of breath     DVT (deep vein thrombosis) in pregnancy      Plan of Treatment:   1. Stable from CV standpoint. Appreciate nephrology recommendations on diuretics and timing of cardiac cath. Reviewed in detail with patient. Questions/concerns addressed  2. Continue PO ASA, statin, & BB.  3. Continue IV Heparin gtt  4.  Keep K >4 and Mg >2    Electronically signed by BEN Dunn CNP on 8/4/2021 at 2:06 PM  [address]  [phone]

## 2021-08-04 NOTE — PLAN OF CARE
Problem: Pain:  Description: Pain management should include both nonpharmacologic and pharmacologic interventions.   Goal: Pain level will decrease  Description: Pain level will decrease  Outcome: Met This Shift     Problem: Physical Regulation:  Goal: Ability to maintain vital signs within normal range will improve  Description: Ability to maintain vital signs within normal range will improve  Outcome: Ongoing     Problem: Respiratory:  Goal: Ability to maintain normal respiratory secretions will improve  Description: Ability to maintain normal respiratory secretions will improve  Outcome: Ongoing     Problem: OXYGENATION/RESPIRATORY FUNCTION  Goal: Patient will maintain patent airway  Outcome: Ongoing

## 2021-08-04 NOTE — CARE COORDINATION
[facility] [place] Flow/Interdisciplinary Rounds Progress Note    Quality Flow Rounds held on August 4, 2021 at [address] [facility] [place] Attending:  Bedside Nurse, ,  and Nursing Unit Leadership    Barriers to Discharge: Cardiac cath    Anticipated Discharge Date:       Anticipated Discharge Disposition: Home goal     Readmission Risk              Risk of Unplanned Readmission:  20           Discussed patient goal for the day, patient clinical progression, and barriers to discharge.   The following Goal(s) of the Day/Commitment(s) have been identified:  Oxygen - Arrange Home O2 and Oxygen - Obtain DME Choice      Concepcion Huang RN  August 4, 2021

## 2021-08-04 NOTE — PROGRESS NOTES
Physical Therapy        Physical Therapy Cancel Note      DATE: 2021    NAME: Sommer Garvey  MRN: [id number]   : 1935      Patient not seen this date for Physical Therapy due to:     Other:  waiting for R/O Covid      Electronically signed by Sumi Espinal PT on 2021 at 12:28 PM

## 2021-08-04 NOTE — CONSULTS
Renal Consult Note    Patient :  Devi Bridges; 82 y.o. MRN# [id number]  Location:  7755/8214-28  Attending:  Alicia Cade MD  Admit Date:  8/2/2021   Hospital Day: 2    Reason for Consult:     Asked by Dr Alicia Cade MD to see for DARYL/Elevated Creatinine. History Obtained From:     patient    History of Present Illness:     Devi Bridges; 82 y.o. 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 Patient was placed on BiPAP with improvement of his respiratory failure. proBNP elevated to 4600, troponins initially uptrending and now have stabilized. Nephrology has been consulted for clearance for cardiac catheterization. On my evaluation, patient was on BiPAP and switched to high flow nasal cannula with 90% FiO2. He reports feeling better today. Patient is alert oriented x2 and is unable to provide any history. Significant other at bedside confirm the above-mentioned history. She states that patient was feeling very weak and lethargic recently after multiple cystoscopy procedures for hematuria. Patient has lost significant weight recently. Patient has CKD stage III with baseline creatinine ranging in 1.2-1.4. Patient does not follow with any nephrologist.  Creatinine on admission was 1.37, increased to 1.73 and today down to 1.61. Apparently no NSAIDs use recently. On Cozaar at home.   Patient also has history of nephrolithiasis in the past.  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 No history of recent contrast exposure, No h/o prolonged NSAIDs use in the past, No recent skin rashes or arthralgias. Doesn't report any reduction in the urine output recently. No h/o recurrent UTIs in the past.    Past History/Allergies? Social History:     Past Medical History:   Diagnosis Date    Abnormal tilt table test 2016    POSITIVE AFTER SYNCOPAL EPISODE    Anxiety     Arthritis     Asthma     mild intermittent w/o complication    Bradycardia     CAD (coronary artery disease)     CHF (congestive heart failure) (HCC)     Chronic systolic (congestive) heart failure (HCC)     COPD [facility]chronic obstructive pulmonary disease) ([facility])     Frequent PVCs     GERD (gastroesophageal reflux disease)     Heart attack ([address] 2000    Hx of blood clots     clot in left leg 3 years ago    Hyperlipidemia     Hypertension     Kidney stones ? patient states passed    Neurocardiogenic syncope     On prednisone therapy     KENDELL (obstructive sleep apnea)     MILD KENDELL B SLEEP SUDY    Poor historian     Renal insufficiency     SOB (shortness of breath)     Syncopal episodes     Thyroid disease     hypothyroidism       No Known Allergies    Social History     Socioeconomic History    Marital status:       Spouse name: Not on file    Number of children: 2    Years of education: Not on file    Highest education level: Not on file   Occupational History     Employer: [employer]   Tobacco Use    Smoking status: Former Smoker     Quit date: 1997     Years since quittin.7    Smokeless tobacco: Current User     Types: Chew   Vaping Use    Vaping Use: Never used   Substance and Sexual Activity    Alcohol use: No     Comment: socially    Drug use: No    Sexual activity: Not on file   Other Topics Concern    Not on file   Social History Narrative    Not on file     Social Determinants of Health     Financial Resource Strain:     Difficulty of Paying Living Expenses:    Food Insecurity:     Worried About Running Out of Food in the Last Year:     [address] in the Last Year:    Transportation Needs:     Lack of Transportation (Medical):  Lack of Transportation (Non-Medical):    Physical Activity:     Days of Exercise per Week:     Minutes of Exercise per Session:    Stress:     Feeling of Stress :    Social Connections:     Frequency of Communication with Friends and Family:     Frequency of Social Gatherings with Friends and Family:     Attends Sabianism Services:     Active Member of Clubs or Organizations:     Attends Club or Organization Meetings:     Marital Status:    Intimate Partner Violence:     Fear of Current or Ex-Partner:     Emotionally Abused:     Physically Abused:     Sexually Abused:        Family History:        Family History   Problem Relation Age of Onset    Heart Attack Father        Outpatient Medications:     Medications Prior to Admission: levothyroxine (SYNTHROID) 50 MCG tablet, TAKE 1 TABLET BY MOUTH EVERY DAY   citalopram (CELEXA) 20 MG tablet, TAKE 1 TABLET BY MOUTH EVERY DAY   losartan (COZAAR) 25 MG tablet, TAKE 1 TABLET BY MOUTH ONE TIME A DAY  meclizine (ANTIVERT) 25 MG tablet, Take 25 mg by mouth as needed for Dizziness   metoprolol tartrate (LOPRESSOR) 25 MG tablet, Take 25 mg by mouth nightly   allopurinol (ZYLOPRIM) 100 MG tablet, daily Indications: TAKES DAILY   predniSONE (DELTASONE) 1 MG tablet, Take 3 mg by mouth daily   aspirin 81 MG tablet, Take 81 mg by mouth daily. nitroGLYCERIN (NITROSTAT) 0.4 MG SL tablet, Place 0.4 mg under the tongue every 5 minutes as needed for Chest pain.     Current Medications:     Scheduled Meds:    OLANZapine  5 mg Oral Nightly    cefTRIAXone (ROCEPHIN) IV  1,000 mg Intravenous Q24H    azithromycin  250 mg Intravenous Q24H    methylPREDNISolone  40 mg Intravenous Q12H    metoprolol tartrate  12.5 mg Oral BID    aspirin  81 mg Oral Daily    predniSONE  3 mg Oral Daily    levothyroxine  50 mcg Oral Daily    sodium chloride flush  5-40 mL Intravenous 2 times per day    [Held by provider] furosemide  20 mg Intravenous BID    atorvastatin  40 mg Oral Nightly     Continuous Infusions:    heparin (PORCINE) Infusion 20 Units/kg/hr (21)    sodium chloride       PRN Meds:  heparin (porcine), heparin (porcine), sodium chloride flush, sodium chloride, ondansetron **OR** ondansetron, polyethylene glycol, acetaminophen **OR** acetaminophen    Review of Systems:     Constitutional: Alert and oriented x2. Lethargy and weakness  HEENT:  No headache, otalgia, itchy eyes, nasal discharge or sore throat. Cardiac:  No chest pain  Chest:   Shortness of breath  Abdomen:  No abdominal pain, nausea or vomiting. Neuro:  No focal weakness, abnormal movements orseizure like activity. Skin:   No rashes, no itching. :   No hematuria, no pyuria, no dysuria, no flank pain. Extremities:  No swelling or joint pains. ROS was otherwise negative except as mentioned in the [address] Input/Output:       I/O last 3 completed shifts: In: 014 [P.O.:475; I.V.:483]  Out: 1000 [Urine:1000]    Vital Signs:   Temperature:  Temp: 97.9 °F (36.6 °C)  TMax:   Temp (24hrs), Av.1 °F (36.7 °C), Min:97.6 °F (36.4 °C), Max:99.5 °F (37.5 °C)    Respirations:  Resp: 25  Pulse:   Pulse: 90  BP:    BP: 119/67  BP Range: Systolic (38GPF), IOQ:510 , Min:104 , BWA:793       Diastolic (75BES), NYL:13, Min:52, Max:72      Physical Examination:     General:  AAO x 2, mild to moderate respiratory distress  HEENT: Atraumatic, normocephalic. Eyes:   Pupils equal, round and reactive to light, EOMI. Neck:   No JVD, no thyromegaly, no lymphadenopathy. Chest:   Bilateral bibasilar crackles  Cardiac:  S1 S2 audible. No S3. Abdomen: Soft, non-tender, no masses or organomegaly, BS audible. :   No suprapubic or flank tenderness. Neuro:  AAO x 3, No FND. SKIN:  No rashes, good skin turgor.   Extremities:  No edema. Palpable peripheral pulses, no calf tenderness. Labs:       Recent Labs     08/02/21  0859 08/03/21  0608 08/04/21  0330   WBC 18.4* 16.4* 17.3*   RBC 4.80 4.15* 4.39   HGB 14.7 12.7* 13.3   HCT 46.6 40.6* 41.9   MCV 97.1 97.8 95.4   MCH 30.6 30.6 30.3   MCHC 31.5 31.3 31.7   RDW 15.3* 15.3* 15.0*    175 181   MPV 9.8 10.2 10.9      BMP:   Recent Labs     08/02/21  0859 08/02/21  0900 08/03/21  0608 08/04/21  0330     --  141 137   K 4.3  --  4.1 3.9     --  107 102   CO2 17*  --  17* 18*   BUN 24*  --  33* 42*   CREATININE 1.37* 1.45* 1.73* 1.61*   GLUCOSE 124*  --  99 134*   CALCIUM 9.0  --  8.4* 8.0*      Phosphorus:   No results for input(s): PHOS in the last 72 hours. Magnesium:  No results for input(s): MG in the last 72 hours.   Albumin:    Recent Labs     08/02/21 0859   LABALBU 3.2*     BNP:    No results found for: BNP  ANNEMARIE:      Lab Results   Component Value Date    ANNEMARIE POSITIVE 01/31/2012     SPEP:  Lab Results   Component Value Date    PROT 6.7 08/02/2021     UPEP:   No results found for: LABPE  C3:   No results found for: C3  C4:   No results found for: C4  MPO ANCA:   No results found for: MPO  PR3 ANCA:   No results found for: PR3  Anti-GBM:   No results found for: GBMABIGG  Hep BsAg:       No results found for: HEPBSAG  Hep C AB:        No results found for: HEPCAB    Urinalysis/Chemistries:      Lab Results   Component Value Date    NITRU NEGATIVE 08/02/2021    COLORU YELLOW 08/02/2021    PHUR 5.5 08/02/2021    WBCUA 50  08/02/2021    RBCUA 5 TO 10 08/02/2021    MUCUS NOT REPORTED 08/02/2021    TRICHOMONAS NOT REPORTED 08/02/2021    YEAST NOT REPORTED 08/02/2021    BACTERIA NOT REPORTED 08/02/2021    SPECGRAV 1.032 08/02/2021    LEUKOCYTESUR SMALL 08/02/2021    UROBILINOGEN Normal 08/02/2021    BILIRUBINUR NEGATIVE 08/02/2021    GLUCOSEU NEGATIVE 08/02/2021    KETUA NEGATIVE 08/02/2021    AMORPHOUS NOT REPORTED 08/02/2021     Urine Sodium:   No results found for: NICOLETTE  Urine Potassium:  No results found for: KUR  Urine Chloride:  No results found for: CLUR  Urine Osmolarity: No results found for: OSMOU  Urine Protein:   No results found for: TPU  Urine Creatinine:   No results found for: LABCREA  UPC:     Urine Eosinophils:  No components found for: UEOS    Radiology:     Reviewed as available. Assessment:     1. Acute Kidney Injury likely cardiorenal from hypoperfusion secondary to acute on chronic CHF. Admitting creatinine 1.37, peaked to 1.73 and now down to 1.61.  2. CKD stage III with baseline creatinine in the range of 1.2-1.4. Do not follow with any nephrologist.  Renal ultrasound negative for obstruction/hydronephrosis although suspicion for ill-defined 2.3 cm hypoechoic lesion in the posterior aspect of left kidney  3. Acute respiratory failure likely secondary to acute on chronic CHF. CT chest with bilateral patchy airspace disease with honeycomb appearance, concerning for IPF. Elevated pro-Pelon, on antibiotics to cover for pneumonia. 4. NSTEMI  5. Ischemic cardiomyopathy with EF 30-35% s/p biventricular AICD for CRT-D in 2017  6. Coronary artery disease s/p CABG in the past with last PCI in 2014  7. Hypertension  8. Hyperlipidemia  9. Recent diagnosis of high-grade noninvasive urothelial bladder carcinoma on biopsy 6/29/2021    Plan:     1. Patient's Lasix is on hold. Recommend to resume diuresis at this point. 2. Follow with labs as ordered  3. Avoid nephrotoxic medications  4. Recommend to hold off of cardiac catheterization until stabilization of kidney function  5. Will Order serum and urine protein electrophoresis to r/o element to occult paraprotein disease. 6. Will order Hepatitis B and C, ANNEMARIE, ANCA, Complement levels. Patient to be discussed with Dr. Killian Hagan    Nutrition   Please ensure that patient is on a renal diet/TF. Avoid nephrotoxic drugs/contrast exposure. Thank you for the consultation.  Please do not hesitate to contact us for any further questions/concerns. We will continue to follow along with you. Gerson Eason MD  Internal Medicine Resident, PGY-2  Nephrology service  [address] [place] [address]  7/9/4270,3:89 AM     Attending Physician Statement  I have discussed the care of Scar Lr, including pertinent history and exam findings with the resident/fellow. I have reviewed the key elements of all parts of the encounter with the resident/fellow. I have seen and examined the patient with the resident/fellow. I agree with the assessment and plan and status of the problem list as documented. Addiitionally I recommend with the patient on high flow oxygen and with pulmonary edema on chest x-ray, I recommend initiating Bumex 2 mg IV every 12 hours for diuresis. Hopefully, the patient's oxygen support can be weaned down. Continue Diaz catheter. Monitor intake and output and labs as ordered.     Henry Magallanes MD   Nephrology Attending Physician  [facility]  8/4/2021

## 2021-08-04 NOTE — PROGRESS NOTES
Congestive Heart Failure Education completed and charted. CHF booklet given. Patient was receptive to education. Discussed the  importance of medication compliance. Discussed the importance of a heart healthy diet. Discussed 2000 mg sodium-restricted daily diet. Patient instructed to limit fluid intake to  1.5 to 2 liters per day. Patient instructed to weigh self at the same time of each day each morning, reinforced teaching to monitor for 3-5 lb weight increase over 1-2 days notify physician if change noted. Signs and symptoms of CHF discussed with patient, such as feeling more tired than normal, feeling short of breath, coughing that increases when lying down, sudden weight gain, swelling of the feet, legs or belly. Patient verbalized understanding to notify physician office if these symptoms occur.     Echo 2018 EF 30-35%  Echo Pending

## 2021-08-04 NOTE — PROGRESS NOTES
[facility]  Internal Medicine Teaching Residency Program  Inpatient Daily Progress Note  ______________________________________________________________________________    Patient: Frank Fuentes  YOB: 1935   HGT:[id number]    Acct: [de-identified]     Room: 18/0-65  Admit date: 8/2/2021  Today's date: 08/04/21  Number of days in the hospital: 2    SUBJECTIVE   CC: shortness of breath   Pt examined at bedside. Chart & results reviewed. - vitals are stable. Was on high flow FiO2 90%. Uses BiPAP overnight and intermittently. - is tolerating PO diets well. Diaz in with strict I's & O's.  Pt stated on Bumex 2 mg IV 12 hrs. Pulmonology not recommending biopsy at this time. Pt started on IV Solu medrol 40 mg   Cardiology planning for cardiac cath, awaiting Nephro clearance. ROS:    Review of Systems   Constitutional: Positive for unexpected weight change. Negative for fatigue and fever. HENT: Negative for sinus pressure and sneezing. Eyes: Negative. Respiratory: Positive for shortness of breath. Negative for cough and wheezing. Cardiovascular: Negative for chest pain. Gastrointestinal: Negative for abdominal pain, anal bleeding and diarrhea. Genitourinary: Negative for flank pain and frequency. Musculoskeletal: Negative for myalgias. Neurological: Negative for light-headedness, numbness and headaches. Psychiatric/Behavioral: Negative for confusion, hallucinations and suicidal ideas.        BRIEF HISTORY     The patient is a pleasant 82 y.o. male  with a PMH of CAD s/p triple CABG (20 yrs ago), s/p stent placement (2014), bladder cancer, HTN, s/p defibrillator placement (01/2017), COPD, arthritis and thyroid disease presents today to the ED with SOB for 3 days. It is exertional, relieves with rest. It is not associated with cough, or chest pain. He denies any orthopnea and PND.  Prior to these, the patient has not been eating properly for a week, he does not feel hungry, and reports that he has lost around 10 lbs in the past week. Pt also c/o fever last night of around 100.4 for which he took tylenol. Afebrile on evaluation here.       He was diagnosed with bladder cancer on 2021 and has been depressed since then. He had an appointment scheduled today with urology for further plan of treatment.      Pertinent labs were ordered.      Troponins: 269-> 300->415  Pro-BNP:4,625  WBC: 18.4    OBJECTIVE     Vital Signs:  /60   Pulse 89   Temp 98 °F (36.7 °C) (Oral)   Resp 22   Wt 178 lb (80.7 kg)   SpO2 92%   BMI 24.14 kg/m²     Temp (24hrs), Av.1 °F (36.7 °C), Min:97.6 °F (36.4 °C), Max:99.7 °F (37.6 °C)    In: 958   Out: 1000 [Urine:1000]    Physical Exam:  Vitals reviewed. Constitutional:       General: He is not in acute distress. Appearance: Normal appearance. HENT:      Head: Normocephalic. Cardiovascular:      Rate and Rhythm: Normal rate. Pulses: Normal pulses. Heart sounds: Normal heart sounds. Pulmonary:      Effort: Pulmonary effort is normal.      Breath sounds: Normal breath sounds and air entry. Abdominal:      General: Bowel sounds are normal.      Palpations: Abdomen is soft. Musculoskeletal:      Right lower leg: No swelling. Left lower leg: No swelling. Skin:     General: Skin is warm. Neurological:      Mental Status: He is alert. Psychiatric:         Behavior: Behavior is cooperative.        Medications:  Scheduled Medications:    OLANZapine  5 mg Oral Nightly    bumetanide  2 mg Intravenous Q12H    cefTRIAXone (ROCEPHIN) IV  1,000 mg Intravenous Q24H    azithromycin  250 mg Intravenous Q24H    methylPREDNISolone  40 mg Intravenous Q12H    metoprolol tartrate  12.5 mg Oral BID    aspirin  81 mg Oral Daily    [Held by provider] predniSONE  3 mg Oral Daily    levothyroxine  50 mcg Oral Daily    sodium chloride flush  5-40 mL Intravenous 2 times per day    [Held by provider] furosemide  20 mg Intravenous BID    atorvastatin  40 mg Oral Nightly     Continuous Infusions:    heparin (PORCINE) Infusion 20 Units/kg/hr (08/03/21 2021)    sodium chloride       PRN Medicationsheparin (porcine), 4,000 Units, PRN  heparin (porcine), 2,000 Units, PRN  sodium chloride flush, 5-40 mL, PRN  sodium chloride, 25 mL, PRN  ondansetron, 4 mg, Q8H PRN   Or  ondansetron, 4 mg, Q6H PRN  polyethylene glycol, 17 g, Daily PRN  acetaminophen, 650 mg, Q6H PRN   Or  acetaminophen, 650 mg, Q6H PRN        Diagnostic Labs:  CBC:   Recent Labs     08/02/21  0859 08/03/21  0608 08/04/21  0330   WBC 18.4* 16.4* 17.3*   RBC 4.80 4.15* 4.39   HGB 14.7 12.7* 13.3   HCT 46.6 40.6* 41.9   MCV 97.1 97.8 95.4   RDW 15.3* 15.3* 15.0*    175 181     BMP:   Recent Labs     08/02/21  0859 08/02/21  0900 08/03/21  0608 08/04/21  0330     --  141 137   K 4.3  --  4.1 3.9     --  107 102   CO2 17*  --  17* 18*   BUN 24*  --  33* 42*   CREATININE 1.37* 1.45* 1.73* 1.61*     BNP: No results for input(s): BNP in the last 72 hours. PT/INR:   Recent Labs     08/02/21  0859   PROTIME 12.1   INR 1.1     APTT:   Recent Labs     08/03/21  1807 08/04/21  0330 08/04/21  1008   APTT 45.7* 64.6* 63.2*     CARDIAC ENZYMES: No results for input(s): CKMB, CKMBINDEX, TROPONINI in the last 72 hours. Invalid input(s): CKTOTAL;3  FASTING LIPID PANEL:  Lab Results   Component Value Date    CHOL 198 04/04/2019    HDL 50 04/04/2019    TRIG 69 04/04/2019     LIVER PROFILE:   Recent Labs     08/02/21  0859   AST 33   ALT 11   BILITOT 1.22*   ALKPHOS 123      MICROBIOLOGY:   Lab Results   Component Value Date/Time    CULTURE NO SIGNIFICANT GROWTH 08/02/2021 11:47 AM       Imaging:    CT CHEST PULMONARY EMBOLISM W CONTRAST    Result Date: 8/2/2021  No evidence of pulmonary embolism. Extensive pulmonary abnormalities, more right-sided as described above.  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 Small pleural effusions, more right-sided and extending along the right major fissure. Mild left heart chamber enlargement. No pericardial effusion. 1.9 cm exophytic left posterior renal lesion, not clearly cystic. Initial correlation with ultrasound recommended. Additional tiny high density cortical lesion on the right. Probable nonobstructing kidney stones. Low density focus within the lumen of the stomach, not clearly fluid. Intraluminal lipoma or other polypoid lesion possible. Small hiatus hernia. Upper GI or endoscopy should be considered. Prior granulomatous disease. Additional likely incidental findings, as detailed in the body of the report above. RECOMMENDATIONS: Pulmonology consultation. ASSESSMENT & PLAN     ASSESSMENT / PLAN:     Acute on chronic systolic heart failure  - Echo done in 2016 showed EF of 30-35%. - s/p AICD in place, pacer interrogation ordered. - Monitor strict I/O   - Started on IV Bumex 2 mg BID   - Daily BMPs   - Echocardiogram ordered.     Acute Hypoxic respiratory failure   - currently on BiPAP. - Maintain O2 sat above 92%. COVID- negative  - CT done on arrival to the ED it showed mild left heart chamber enlargement, extensive pulmonary abnormalities, small pleural effusions, no evidence of pulmonary embolism. - Pulmonology consulted.      NSTEMI   -Troponins have been trending up 269-> 300->415-> 686  - continue heparin drip. - Cardio recommended cardiac cath, awaiting Nephro clearance.     Leucocytosis suspected due to a lung source  - WBC: 18.4   - CT done on arrival to the ED it showed mild left heart chamber enlargement, extensive pulmonary abnormalities, small pleural effusions, no evidence of pulmonary embolism.    - Lactic acid: 2.4. Will continue to monitor.   - order procalcitonin. - Urine culture showed no significant growth. Blood culture shows no growth at 23 hours. - continue Zithromax and Rocephin.      Coronary artery disease  - pt is s/p CABG x3 2009   - pt had stent placement in 2014   - On ASA 81mg at home   - Continue ASA.      Hypothyroidism  - On levothyroxine 50mcg at home   - continue here      Essential Hypertension  - On Losartan 25mg       Chen Johnson MD  Internal Medicine Resident, PGY-2  [facility], [facility]         8/4/2021, 1:40 PM      I have discussed the care of Brenden Joyner , including pertinent history and exam findings,    today with the resident. I have seen and examined the patient and the key elements of all parts of the encounter have been performed by me . I agree with the assessment, plan and orders as documented by the resident. Active Problems:    AICD (automatic cardioverter/defibrillator) present    Shortness of breath    DVT (deep vein thrombosis) in pregnancy  Resolved Problems:    * No resolved hospital problems. *        Overall  course ;                                   show no change over time.         Still requiring high flow oxygen  NSTEMI  Will need cardiac cath  Nephrology consulted with elevated creatinine  Pulmonary medicine following  Patient is critically sick  Has renal lesion, with need renal protocol MRI, will have outpatient with elevated creatinine numbers          Electronically signed by Jocy Donohue MD

## 2021-08-04 NOTE — PROGRESS NOTES
[facility]  Occupational Therapy Not Seen Note    DATE: 2021  Name: Cyndy Murphy  : 1935  MRN: [id number]    Patient not available for Occupational Therapy due to:    [x] Testing: Covid being ruled out, awaiting results. Next Scheduled Treatment: Attempt in pm or  as appropriate.     Pauly Smith OT OTR/L

## 2021-08-04 NOTE — CONSULTS
PULMONARY & CRITICAL CARE MEDICINE CONSULT NOTE     Patient:  Sandi Vázquez  MRN: [id number]  Admit date: 8/2/2021  Primary Care Physician: Omar Mazariegos MD  Consulting Physician: Carolina Andersen MD  CODE Status: Full Code   LOS: 2    HISTORY     CHIEF COMPLAINT/REASON FOR CONSULT:    Interstitial lung disease/acute hypoxic respiratory failure. HISTORY OF PRESENT     Patient doing better clinically. His clinical status is improving pulmonary status is improving he does have history of smoking in the past although he gave it up from about 10 years back. He most likely has COPD. He is not been taking any bronchodilators and he was not advisable to COPD. Patient did have exposure to asbestos and had very likely interstitial lung disease. However he also was exposed to amiodarone for 2 years and there that may have also contributed to some other interstitial process. History of coronary artery disease had AICD in place he probably has heart failure. He is on supplemental oxygen. His oxygen saturation 97%. He is not in any respiratory distress his head does not have any abdominal paradox. He is not using any accessory muscles. Afebrile. No pedal edema no thromboembolic process JVP not evaluated. We mentioned in the history and chart that he may have sleep apnea syndrome although I doubt that. He did not have a hypothyroid state any replacement therapy. Has chronic renal insufficiency.   history of  PAST MEDICAL HISTORY:        Diagnosis Date    Abnormal tilt table test 08/2016    POSITIVE AFTER SYNCOPAL EPISODE    Anxiety     Arthritis     Asthma     mild intermittent w/o complication    Bradycardia     CAD (coronary artery disease)     CHF (congestive heart failure) (HCC)     Chronic systolic (congestive) heart failure (HCC)     [facility]     Frequent PVCs     GERD (gastroesophageal reflux disease)     Heart attack ([facility] [address] 2000    Hx of blood clots clot in left leg 3 years ago    Hyperlipidemia     Hypertension     Kidney stones 2009? patient states passed    Neurocardiogenic syncope     On prednisone therapy     KENDELL (obstructive sleep apnea)     MILD KENDELL B SLEEP SUDY    Poor historian     Renal insufficiency     SOB (shortness of breath)     Syncopal episodes     Thyroid disease     hypothyroidism     PAST SURGICAL HISTORY:        Procedure Laterality Date    CARDIAC DEFIBRILLATOR PLACEMENT  01/30/2017    BI-V ICD DR Airam Villalta    CARDIAC DEFIBRILLATOR PLACEMENT  01/30/2017    CRT    CARDIAC DEFIBRILLATOR PLACEMENT  01/30/2017    CRT    CARDIAC SURGERY  7/12/2000    CABG X3 LIMA-LAD, SVG-DIAG DBG-RCA DR. Collin Ricketts      CORONARY ANGIOPLASTY WITH STENT PLACEMENT  2003    [facility] Eric Gudino    CYSTOSCOPY Right 10/06/2014    cysto with stent    CYSTOSCOPY  05/07/2019    transurethral resection bladder by Dr. Granados Class 5/7/2019    CYSTOSCOPY TRANSURETHRAL RESECTION BLADDER performed by Arnold Jarvis MD at [address] [place] [address] 4/2/2021    CYSTOSCOPY, DILITATION, CAUTERIZATION OF PROSTATE VARICES. performed by Arnold Jarvis MD at [address] [facility] N/A 8/21/2020    CYSTOSCOPY performed by Arnold Jarvis MD at [place] [address] CATH LAB PROCEDURE  11/2005 - 2/2014    BOTH TIMES SHOWING ALL 3 GRAFTS PATENT WITH OCCLUDED LCX / ATTEMPTED PCI TO LCX WAS UNSUCCESSFUL    LITHOTRIPSY Right 6/29/2021    CYSTOSCOPY BILATERAL PYELOGRAM WITH  BLADDER BIOPSY AND CAUTERIZATION performed by Arnold Jarvis MD at [address] [facility] HISTORY:       Problem Relation Age of Onset    Heart Attack Father      SOCIAL HISTORY:   TOBACCO:   reports that he quit smoking about 23 years ago. His smokeless tobacco use includes chew. ETOH:  reports no history of alcohol use.   DRUGS: reports no history of drug use.  AVOCATION/OCCUPATIONAL EXPOSURE:    The patient reports asbestos. The patient denies  to having pet dogs, cats, turtles or exotic birds at home. There is no history of TB or TB exposure. There is no exposure to sick contacts. Travel history is not significant history of risk factors for pulmonary disease. The patient denies using Hot Tubs. ALLERGIES:    No Known Allergies      HOME MEDICATIONS:  Prior to Admission medications    Medication Sig Start Date End Date Taking? Authorizing Provider   levothyroxine (SYNTHROID) 50 MCG tablet TAKE 1 TABLET BY MOUTH EVERY DAY  4/12/21   Yasmine Victor MD   citalopram (CELEXA) 20 MG tablet TAKE 1 TABLET BY MOUTH EVERY DAY  4/12/21   Yasmine Victor MD   losartan (COZAAR) 25 MG tablet TAKE 1 TABLET BY MOUTH ONE TIME A DAY 11/14/19   Historical Provider, MD   meclizine (ANTIVERT) 25 MG tablet Take 25 mg by mouth as needed for Dizziness     Historical Provider, MD   metoprolol tartrate (LOPRESSOR) 25 MG tablet Take 25 mg by mouth nightly  10/17/16   Yasmine Victor MD   allopurinol (ZYLOPRIM) 100 MG tablet daily Indications: TAKES DAILY  11/6/15   Historical Provider, MD   predniSONE (DELTASONE) 1 MG tablet Take 3 mg by mouth daily     Historical Provider, MD   aspirin 81 MG tablet Take 81 mg by mouth daily. Historical Provider, MD   nitroGLYCERIN (NITROSTAT) 0.4 MG SL tablet Place 0.4 mg under the tongue every 5 minutes as needed for Chest pain.     Historical Provider, MD     IMMUNIZATIONS:  Most Recent Immunizations   Administered Date(s) Administered    DEEID-19, Aceves Peter, PF, 30mcg/0.3mL 02/12/2021    Influenza, Quadv, adjuvanted, 65 yrs +, IM, PF (Fluad) 11/25/2020    Pneumococcal Polysaccharide ([id number]) 11/25/2020       REVIEW OF SYSTEMS:  General: Positive for fatigue, negative for chills, fatigue or fever  ENT: negative for headaches, nasal congestion, sore throat or visual changes  Allergy and Immunology: negative for postnasal drip or seasonal distress, mild accessory muscles use, normal resonance on percussion, air entry is present bilaterally without expiratory wheezing and rhonchi. He has bilateral and expiratory dry crackles present.  Heart - normal rate, regular rhythm, normal S1, S2, no murmurs, rubs, clicks or gallops   Abdomen - soft, nontender, nondistended, no masses or organomegaly   Neurological - motor and sensory grossly normal bilaterally   Musculoskeletal - no joint tenderness, deformity or swelling   Extremities - peripheral pulses normal, no pedal edema, no clubbing or cyanosis   Skin - normal coloration and turgor, no rashes, no suspicious skin lesions noted    DATA REVIEW     Medications: Current Inpatient  Scheduled Meds:   OLANZapine  5 mg Oral Nightly    cefTRIAXone (ROCEPHIN) IV  1,000 mg Intravenous Q24H    azithromycin  250 mg Intravenous Q24H    methylPREDNISolone  40 mg Intravenous Q12H    metoprolol tartrate  12.5 mg Oral BID    aspirin  81 mg Oral Daily    [Held by provider] predniSONE  3 mg Oral Daily    levothyroxine  50 mcg Oral Daily    sodium chloride flush  5-40 mL Intravenous 2 times per day    [Held by provider] furosemide  20 mg Intravenous BID    atorvastatin  40 mg Oral Nightly     Continuous Infusions:   heparin (PORCINE) Infusion 20 Units/kg/hr (08/03/21 2021)    sodium chloride       INPUT/OUTPUT:  In: 958 [P.O.:475; I.V.:483]  Out: 1000 [Urine:1000]    LABS:-  ABG:   No results for input(s): POCPH, POCPCO2, POCPO2, POCHCO3, FGAH4WJT in the last 72 hours.   CBC:   Recent Labs     08/02/21  0859 08/03/21  0608 08/04/21  0330   WBC 18.4* 16.4* 17.3*   HGB 14.7 12.7* 13.3   HCT 46.6 40.6* 41.9   MCV 97.1 97.8 95.4    175 181   LYMPHOPCT 6* 6* 2*   RBC 4.80 4.15* 4.39   MCH 30.6 30.6 30.3   MCHC 31.5 31.3 31.7   RDW 15.3* 15.3* 15.0*     BMP:   Recent Labs     08/02/21  0859 08/02/21  0900 08/03/21  0608 08/04/21  0330     --  141 137   K 4.3  --  4.1 3.9     --  107 102   CO2 17*  --  17* 18*   BUN 24*  --  33* 42*   CREATININE 1.37* 1.45* 1.73* 1.61*   GLUCOSE 124*  --  99 134*     Liver Function Test:   Recent Labs     08/02/21  0859   PROT 6.7   LABALBU 3.2*   ALT 11   AST 33   ALKPHOS 123   BILITOT 1.22*     Amylase/Lipase:  Recent Labs     08/02/21  0859   AMYLASE 17*   LIPASE 9*     Coagulation Profile:   Recent Labs     08/02/21  0859 08/02/21  1510 08/03/21  1807 08/04/21  0330 08/04/21  1008   INR 1.1  --   --   --   --    PROTIME 12.1  --   --   --   --    APTT 23.6   < > 45.7* 64.6* 63.2*    < > = values in this interval not displayed. Cardiac Enzymes:  No results for input(s): CKTOTAL, CKMB, CKMBINDEX, TROPONINI in the last 72 hours. Lactic Acid:  No results found for: LACTA  BNP:   No results found for: BNP  D-Dimer:  No results found for: DDIMER  Others:   Lab Results   Component Value Date    TSH 5.35 (H) 08/03/2021     Lab Results   Component Value Date    ANNEMARIE POSITIVE (A) 01/31/2012    SEDRATE 4 04/30/2020    CRP 1.1 04/30/2020     Lab Results   Component Value Date    URICACID 5.0 04/30/2020     No results found for: IRON, TIBC, FERRITIN  No results found for: SPEP, UPEP  Lab Results   Component Value Date    PSA 0.31 12/03/2019     Microbiology:  Recent Labs     08/02/21  1147   [address] [facility] . CLEAN CATCH URINE   SPECIAL NOT REPORTED   CULTURE NO SIGNIFICANT GROWTH     Pathology:    Radiology Reports:  XR CHEST PORTABLE   Final Result   Persistent mixed interstitial and alveolar opacities in the right greater   than left lungs not substantially changed relative to yesterday on a   background of chronic appearing interstitial changes. US RENAL COMPLETE   Final Result   No acute findings. No hydronephrosis. Ill-defined 2.3 cm hypoechoic lesion in the posterior aspect left kidney may   represent the abnormality on the recent CT but cannot be accurately   characterized and a small solid mass should be excluded.   Recommend dedicated   MRI or CT renal protocol when the patient's clinical condition allows. CT CHEST PULMONARY EMBOLISM W CONTRAST   Final Result   No evidence of pulmonary embolism. Extensive pulmonary abnormalities, more right-sided as described above. 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 Small pleural effusions, more right-sided and extending along the   right major fissure. Mild left heart chamber enlargement. No pericardial effusion. 1.9 cm exophytic left posterior renal lesion, not clearly cystic. Initial   correlation with ultrasound recommended. Additional tiny high density   cortical lesion on the right. Probable nonobstructing kidney stones. Low density focus within the lumen of the stomach, not clearly fluid. Intraluminal lipoma or other polypoid lesion possible. Small hiatus hernia. Upper GI or endoscopy should be considered. Prior granulomatous disease. Additional likely incidental findings, as detailed in the body of the report   above. RECOMMENDATIONS:   Pulmonology consultation.              Pulmonary Function test:    Polysomnogram:    Echocardiogram:   Results for orders placed during the hospital encounter of 08/29/16    Echocardiogram complete 2D with doppler with color    Narrative  Transthoracic Echocardiography Report (TTE)    Patient Name Heather Bassett      Date of Study               08/31/2016  [facility]    Date of      1935  Gender                      Male  Birth    Age          68 year(s)  Race                            Room Number  3022        Height:                     72 inch, 182.88 cm    Corporate ID [id number]  Weight:                     185 pounds, 83.9 kg  #    Patient Acct [de-identified]    BSA: 2.06 m^2      BMI:      25.09  #                                                              kg/m^2    MR #         [id number]     Kory Borges    Accession #  [id number]   Interpreting Physician      Camille Rosa    Fellow                   Referring Nurse  Practitioner    Interpreting             Referring Physician         Sunil Bell    Type of Study    TTE procedure:2D Echocardiogram, M-Mode, Doppler, Color Doppler. Procedure Date  Date: 08/31/2016 Start: 11:27 AM    Study Location: [facility]    Maria L / Milena Byrd. Comments:  Procedure explained to patient. Syncope and collapse  PMHx: CABG 1999/2014 Cardiac cath-patent grafts    Patient Status: Inpatient    Height: 72 inches Weight: 185 pounds BSA: 2.06 m^2 BMI: 25.09 kg/m^2    Allergies  - *No Known Allergies. CONCLUSIONS    Summary  Technically difficult study. Left ventricle is normal in size Global left ventricular systolic function  is moderate to severly reduced Estimated ejection fraction is 30-35 %  Abnormal E/A wave ratio on transmitral Doppler consistent with abnormal left  ventricular relaxation (diastolic dysfunction). Signature  ----------------------------------------------------------------------------  Electronically signed by Codey Agrawal on 08/31/2016 12:01  PM  ----------------------------------------------------------------------------    ----------------------------------------------------------------------------  Electronically signed by Mick Salomon(Interpreting physician) on  08/31/2016 07:42 PM  ----------------------------------------------------------------------------  FINDINGS  Left Atrium  Left atrium is normal in size. Left Ventricle  Left ventricle is normal in size Global left ventricular systolic function  is moderate to severly reduced Estimated ejection fraction is 30-35 % .   Abnormal E/A wave ratio on transmitral Doppler consistent with abnormal left  ventricular relaxation (diastolic dysfunction). The Inferobasal segment is Akinetic. Right Atrium  Right atrium is normal in size. Right Ventricle  Normal right ventricular size and function. Mitral Valve  Normal mitral valve leaflets. Mild mitral regurgitation. Aortic Valve  Aortic valve sclerosis without stenosis. Tricuspid Valve  Tricuspid valve leaflets appear to be normal.  Estimated right ventricular systolic pressure is 34 mmHg. Pericardial Effusion  No significant pericardial effusion is seen. Miscellaneous  Normal aortic root dimension. M-mode / 2D Measurements & Calculations:    LVIDd:4.52 cm(3.7 - 5.6 cm)      Diastolic BIJAKH:39.0 ml  FUTN:6.42 cm(0.6 - 1.1 cm)       Aortic Root:2.9 cm(2.0 - 3.7 cm)  LVPWd:0.81 cm(0.6 - 1.1 cm)      LA Dimension: 3.1 cm(1.9 - 4.0 cm)  LA volume/Index: 27.33 ml /13m^2    Mitral:                                  Aortic    Valve Area (P1/2-Time): 4.07 cm^2        Peak Velocity: 1.26 m/s  Peak E-Wave: 0.73 m/s                    Peak Gradient: 6.35 mmHg  Peak A-Wave: 0.67 m/s  E/A Ratio: 1.09  Peak Gradient: 2.11 mmHg  Mean Gradient: 2 mmHg  Deceleration Time: 187 msec  P1/2t: 54 msec    Mean Velocity: 0.56 m/s    Tricuspid:                               Pulmonic:    Estimated RVSP: 34 mmHg  Peak TR Velocity: 2.45 m/s  Peak TR Gradient: 24.01 mmHg  Estimated RA Pressure: 10 mmHg  Estimated PASP: 34.01 mmHg    Diastology / Tissue Doppler  Septal Wall E' velocity:0.07 m/s  Septal Wall E/E':10  Lateral Wall E' velocity:0.11 m/s  Lateral Wall E/E':6      Cardiac Catheterization:   No results found for this or any previous visit. ASSESSMENT AND PLAN     Assessment:    //Radiological changes are very likely manifestation of interstitial lung disease which have been contributed to multiple factors such as asbestosis exposure, amiodarone exposure in the past and very likely also contributed to by heart failure.   This has led to respiratory failure which are primarily hypoxic in nature. Patient respiratory failure seems to be improving.    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  //Non-ST elevation MI  //Small bilateral pleural effusion  //Coronary artery disease status post CABG  //Status post AICD  //History of nephrolithiasis  //Chronic steroid use for polymyalgia rheumatica on 3 mg prednisone.  //History of amiodarone use in the past.    Plan:  Patient has been showing progressive improvement. We should continue present treatment with the supplemental oxygen. Continue small dose of steroid for polymyalgia rheumatica. He would benefit from BiPAP. He might even benefit from trilogy vent at home. He will continue the treatment for the heart failure. Continue with IV fluid collection of DVT. Continue treatment for heart failure. Fluid pleural effusion is a relatively small and does not require any pleurocentesis  Clinically he does have interstitial lung disease. This is apparent on CT scanning. I will prefer not to do any biopsies or any other procedures to prove the diagnosis. His history is supportive. It was my pleasure to evaluate [employer] today. We will continue to follow. I would like to thank you for allowing me to participate in the care of this patient. Please feel free to call with any further questions or concerns. Katelynn Sadnoval MD.   Pulmonary and Critical Care Medicine           8/4/2021, 11:38 AM    Please note that this chart was generated using voice recognition Dragon dictation software. Although every effort was made to ensure the accuracy of this automated transcription, some errors in transcription may have occurred.

## 2021-08-04 NOTE — PLAN OF CARE
Problem: Respiratory  Intervention: Respiratory assessment  Note:   PROVIDE ADEQUATE OXYGENATION WITH ACCEPTABLE SP02/ABG'S    [x]  IDENTIFY APPROPRIATE OXYGEN THERAPY  [x]   MONITOR SP02/ABG'S AS NEEDED   [x]   PATIENT EDUCATION AS NEEDED     PATIENT REFUSES TO WEAR BIPAP     [x] Risks and benefits explained to patient   [x] Patient refuses to wear Bipap stating \"NO! I don't want anything like that, you're Kaila Elizondo lose your job if you wake me for that again\"  [x] Patient verbalizes understanding of information presented.

## 2021-08-05 PROBLEM — J96.01 ACUTE HYPOXEMIC RESPIRATORY FAILURE (HCC): Status: ACTIVE | Noted: 2021-01-01

## 2021-08-05 PROBLEM — J18.9 COMMUNITY ACQUIRED PNEUMONIA OF RIGHT LUNG: Status: ACTIVE | Noted: 2021-01-01

## 2021-08-05 NOTE — PROGRESS NOTES
[facility]  Progress Note                   Date:   8/5/2021  Patient name: Garrison Kramer  Date of admission:  8/2/2021  8:48 AM  MRN:   [id number]  YOB: 1935  PCP: Alison Maloney MD    Reason for Admission: Shortness of breath [R06.02]    Subjective:       Clinical Changes /Abnormalities: Seen & examined in room with daughter at bedside. Overnight and this AM with some added resp distress and hypoxia. He denies any chest pain. States breathing feels \"short. \" He is oriented to person and place but does have random periods where he need redirected. Currently on high flow oxygen with sats 89% however he is a mouth breather and NC is not staying in nose very well. Tele remains paced with PVCs. Labs noted. Extensive conversation with daughter and patient at bedside. Review of Systems    Medications:   Scheduled Meds:   OLANZapine  5 mg Oral Nightly    bumetanide  2 mg Intravenous Q12H    cefTRIAXone (ROCEPHIN) IV  1,000 mg Intravenous Q24H    azithromycin  250 mg Intravenous Q24H    methylPREDNISolone  40 mg Intravenous Q12H    metoprolol tartrate  12.5 mg Oral BID    aspirin  81 mg Oral Daily    [Held by provider] predniSONE  3 mg Oral Daily    levothyroxine  50 mcg Oral Daily    sodium chloride flush  5-40 mL Intravenous 2 times per day    atorvastatin  40 mg Oral Nightly     Continuous Infusions:   heparin (PORCINE) Infusion 20 Units/kg/hr (08/04/21 1752)    sodium chloride       CBC:   Recent Labs     08/03/21  0608 08/04/21  0330 08/05/21  0641   WBC 16.4* 17.3* 24.8*   HGB 12.7* 13.3 13.7    181 238     BMP:    Recent Labs     08/03/21  0608 08/04/21  0330 08/05/21  0641    137 139   K 4.1 3.9 3.1*    102 101   CO2 17* 18* 18*   BUN 33* 42* 57*   CREATININE 1.73* 1.61* 1.87*   GLUCOSE 99 134* 162*     Hepatic:  No results for input(s): AST, ALT, ALB, BILITOT, ALKPHOS in the last 72 hours.   Troponin:   Recent Labs     08/02/21  7540 08/03/21  0608 08/03/21  1300   TROPHS 686* 527* 434*     BNP: No results for input(s): BNP in the last 72 hours. Lipids: No results for input(s): CHOL, HDL in the last 72 hours. Invalid input(s): LDLCALCU  INR:   No results for input(s): INR in the last 72 hours. Objective:   Vitals: /69   Pulse 105   Temp 98.6 °F (37 °C) (Temporal)   Resp (!) 38   Wt 178 lb (80.7 kg)   SpO2 (!) 89%   BMI 24.14 kg/m²   General appearance: alert and cooperative with exam. Frail appearance. Pale  HEENT: Head: Normocephalic, no lesions, without obvious abnormality. Neck:no JVD, trachea midline, no adenopathy  Lungs: Clear to auscultation left side, diminished with rales noted RLL. On high flow oxygen  Heart: Regular rate and rhythm, s1/s2 auscultated, no murmurs. Paced with PVCs  Abdomen: soft, non-tender, bowel sounds active  Extremities: no edema  Neurologic: not done    Brenden Joyner is a 82 y.o.   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 chun)     Anxiety     CAD (coronary artery disease)     Hyperlipemia     Stented coronary artery     GERD (gastroesophageal reflux disease)     Ureteral calculus, right     IFG (impaired fasting glucose)     Renal insufficiency     Essential hypertension     Mobility impaired     Mild intermittent asthma without complication     S/P implantation of automatic cardioverter/defibrillator (AICD)-CRT 1/30/17 - Dr. Socrates Lema     Bladder polyp     Pneumococcal vaccination declined     Influenza vaccination declined     Polymyalgia rheumatica ([address]     On prednisone therapy     Neurocardiogenic syncope     Hypothyroid     AICD (automatic cardioverter/defibrillator) present     Shortness of breath     DVT (deep vein thrombosis) in pregnancy      Plan of Treatment:   1. Increased resp distress overnight now on high flow oxygen. EXTENSIVE conversation with patient and daughter at bedside. Daughter has POA and Living will with her but states she thought that meant \"they would not move him to ICU and do all of this testing. \" Reviewed in detail with patient and daughter patient's current code status of Full along with other code options. After discussion they would like to discuss further with palliative team. Likely switch to [facility]. At this time they do not want invasive testing, including cardiac cath, nor intubation. Questions/concerns addressed in detail. Continue supportive care at this time. 2. Continue IV Bumex as still with volume overload and monitor renal function closely. Appreciate nephrology recommendations. 3. Continue PO ASA, statin, & BB.  4. Continue IV Heparin gtt for now. Consider switching to SQ after discussion with palliative team/code status/plans  5. Emotional support provided. Questions/concerns addressed in detail  6.  Keep K >4 and Mg >2    Electronically signed by BEN Stanley CNP on 8/5/2021 at 10:31 AM  [address]  [phone]

## 2021-08-05 NOTE — PROGRESS NOTES
[facility])  Occupational Therapy Not Seen Note    DATE: 2021    NAME: Kristofer Pickett  MRN: [id number]   : 1935      Patient not seen this date for Occupational Therapy due to:    Patient is not appropriate for active participation in OT evaluation/treatment at this time d/t respiratory issues. SpO2 dropping with minimal activity. Pt agitated intermittently and not appropriately following commands.  OT will check back as pt becomes appropriate for evaluation    Next Scheduled Treatment: 2021    Electronically signed by CONTRERAS Allan on 2021 at 10:48 AM

## 2021-08-05 NOTE — PROGRESS NOTES
Renal progress note    Subjective:       Patient seen and examined at bedside. Patient remained on BiPAP overnight. Patient was still on BiPAP this morning with 75% FiO2, saturating around 90%. He got switched to high flow nasal cannula with 100% FiO2 at this point. Patient appears anxious and respiratory tachypneic and tachycardic. He was started on Bumex 2 mg twice daily yesterday. He diuresed 4 L urine output in the last 24 hours. Blood pressure has been stable and tolerating Bumex well. We will keep him on Bumex as long as blood pressure tolerates    Past History/Allergies? Social History:     Past Medical History:   Diagnosis Date    Abnormal tilt table test 2016    POSITIVE AFTER SYNCOPAL EPISODE    Anxiety     Arthritis     Asthma     mild intermittent w/o complication    Bradycardia     CAD (coronary artery disease)     [facility]     Chronic systolic (congestive) heart failure ([facility])     COPD (chronic obstructive pulmonary disease) ([facility])     Frequent PVCs     GERD (gastroesophageal reflux disease)     Heart attack ([address] 2000    Hx of blood clots     clot in left leg 3 years ago    Hyperlipidemia     Hypertension     Kidney stones ? patient states passed    Neurocardiogenic syncope     On prednisone therapy     KENDELL (obstructive sleep apnea)     MILD KENDELL B SLEEP SUDY    Poor historian     Renal insufficiency     SOB (shortness of breath)     Syncopal episodes     Thyroid disease     hypothyroidism       No Known Allergies    Social History     Socioeconomic History    Marital status:       Spouse name: Not on file    Number of children: 2    Years of education: Not on file    Highest education level: Not on file   Occupational History     Employer: [employer]   Tobacco Use    Smoking status: Former Smoker     Quit date: 1997     Years since quittin.7    Smokeless tobacco: Current User     Types: Chew   Vaping Chest pain. Current Medications:     Scheduled Meds:    potassium chloride  40 mEq Intravenous Once    OLANZapine  5 mg Oral Nightly    bumetanide  2 mg Intravenous Q12H    cefTRIAXone (ROCEPHIN) IV  1,000 mg Intravenous Q24H    azithromycin  250 mg Intravenous Q24H    methylPREDNISolone  40 mg Intravenous Q12H    metoprolol tartrate  12.5 mg Oral BID    aspirin  81 mg Oral Daily    [Held by provider] predniSONE  3 mg Oral Daily    levothyroxine  50 mcg Oral Daily    sodium chloride flush  5-40 mL Intravenous 2 times per day    [Held by provider] furosemide  20 mg Intravenous BID    atorvastatin  40 mg Oral Nightly     Continuous Infusions:    heparin (PORCINE) Infusion 20 Units/kg/hr (21 1758)    sodium chloride       PRN Meds:  heparin (porcine), heparin (porcine), sodium chloride flush, sodium chloride, ondansetron **OR** ondansetron, polyethylene glycol, acetaminophen **OR** acetaminophen    Review of Systems:     Constitutional: Alert and oriented x2. Tachypneic, lethargy and weakness  HEENT:  No headache, otalgia, itchy eyes, nasal discharge or sore throat. Cardiac:  No chest pain  Chest:   Shortness of breath  Abdomen:  No abdominal pain, nausea or vomiting. Neuro:  No focal weakness, abnormal movements orseizure like activity. Skin:   No rashes, no itching. :   No hematuria, no pyuria, no dysuria, no flank pain. Extremities:  No swelling or joint pains. ROS was otherwise negative except as mentioned in the [address] Input/Output:       I/O last 3 completed shifts: In: 1779.1 [P.O.:1100; I.V.:579.1;  IV Piggyback:100]  Out: 1675 [Urine:1675]    Vital Signs:   Temperature:  Temp: 97.8 °F (36.6 °C)  TMax:   Temp (24hrs), Av °F (36.7 °C), Min:97.8 °F (36.6 °C), Max:98.6 °F (37 °C)    Respirations:  Resp: 24  Pulse:   Pulse: 113  BP:    BP: (!) 140/74  BP Range: Systolic (13DRF), POY:268 , Min:107 , EAO:054       Diastolic (21NTR), JUZ:60, Min:60, Max:76      Physical Examination:     General:  AAO x 2, mild to moderate respiratory distress  HEENT: Atraumatic, normocephalic. Eyes:   Pupils equal, round and reactive to light, EOMI. Neck:   No JVD, no thyromegaly, no lymphadenopathy. Chest:   Bilateral bibasilar crackles, mild wheeze  Cardiac:  S1 S2 audible. No S3. Abdomen: Soft, non-tender, no masses or organomegaly, BS audible. :   No suprapubic or flank tenderness. Neuro:  AAO x 3, No FND. SKIN:  No rashes, good skin turgor. Extremities:  No edema. Palpable peripheral pulses, no calf tenderness. Labs:       Recent Labs     08/03/21  0608 08/04/21  0330 08/05/21  0641   WBC 16.4* 17.3* 24.8*   RBC 4.15* 4.39 4.48   HGB 12.7* 13.3 13.7   HCT 40.6* 41.9 41.6   MCV 97.8 95.4 92.9   MCH 30.6 30.3 30.6   MCHC 31.3 31.7 32.9   RDW 15.3* 15.0* 14.9*    181 238   MPV 10.2 10.9 11.2      BMP:   Recent Labs     08/03/21  0608 08/04/21  0330 08/05/21  0641    137 139   K 4.1 3.9 3.1*    102 101   CO2 17* 18* 18*   BUN 33* 42* 57*   CREATININE 1.73* 1.61* 1.87*   GLUCOSE 99 134* 162*   CALCIUM 8.4* 8.0* 8.3*      Phosphorus:   No results for input(s): PHOS in the last 72 hours.   Magnesium:    Recent Labs     08/05/21  0641   MG 3.3*     Albumin:    Recent Labs     08/02/21  0859   LABALBU 3.2*     BNP:    No results found for: BNP  ANNEMARIE:      Lab Results   Component Value Date    ANNEMARIE POSITIVE 01/31/2012     SPEP:  Lab Results   Component Value Date    PROT 5.2 08/04/2021    ALBCAL PENDING 08/04/2021    ALBPCT PENDING 08/04/2021    LABALPH PENDING 08/04/2021    LABALPH PENDING 08/04/2021    A1PCT PENDING 08/04/2021    A2PCT PENDING 08/04/2021    LABBETA PENDING 08/04/2021    BETAPCT PENDING 08/04/2021    GAMGLOB PENDING 08/04/2021    GGPCT PENDING 08/04/2021    PATH PENDING 08/04/2021     UPEP:   No results found for: LABPE  C3:     Lab Results   Component Value Date    C3 126 08/04/2021     C4:     Lab Results   Component Value Date    C4 26 08/04/2021     MPO ANCA:   No results found for: MPO  PR3 ANCA:   No results found for: PR3  Anti-GBM:   No results found for: GBMABIGG  Hep BsAg:         Lab Results   Component Value Date    HEPBSAG NONREACTIVE 08/04/2021     Hep C AB:          Lab Results   Component Value Date    HEPCAB NONREACTIVE 08/04/2021       Urinalysis/Chemistries:      Lab Results   Component Value Date    NITRU NEGATIVE 08/02/2021    COLORU YELLOW 08/02/2021    PHUR 5.5 08/02/2021    WBCUA 50  08/02/2021    RBCUA 5 TO 10 08/02/2021    MUCUS NOT REPORTED 08/02/2021    TRICHOMONAS NOT REPORTED 08/02/2021    YEAST NOT REPORTED 08/02/2021    BACTERIA NOT REPORTED 08/02/2021    SPECGRAV 1.032 08/02/2021    LEUKOCYTESUR SMALL 08/02/2021    UROBILINOGEN Normal 08/02/2021    BILIRUBINUR NEGATIVE 08/02/2021    GLUCOSEU NEGATIVE 08/02/2021    KETUA NEGATIVE 08/02/2021    AMORPHOUS NOT REPORTED 08/02/2021     Urine Sodium:   No results found for: NICOLETTE  Urine Potassium:  No results found for: KUR  Urine Chloride:  No results found for: CLUR  Urine Osmolarity: No results found for: OSMOU  Urine Protein:   No results found for: TPU  Urine Creatinine:   No results found for: LABCREA  UPC:     Urine Eosinophils:  No components found for: UEOS    Radiology:     Reviewed as available. Assessment:     1. Acute Kidney Injury likely cardiorenal from hypoperfusion secondary to acute on chronic CHF. Admitting creatinine 1.37. Uptrending creatinine at this point. 2. CKD stage III with baseline creatinine in the range of 1.2-1.4. Do not follow with any nephrologist.  Renal ultrasound negative for obstruction/hydronephrosis although suspicion for ill-defined 2.3 cm hypoechoic lesion in the posterior aspect of left kidney  3. Acute respiratory failure likely secondary to acute on chronic CHF. CT chest with bilateral patchy airspace disease with honeycomb appearance, concerning for IPF. Elevated pro-Pelon, on antibiotics to cover for pneumonia. 4. NSTEMI  5.  Ischemic cardiomyopathy with EF 30-35% s/p biventricular AICD for CRT-D in 2017  6. Coronary artery disease s/p CABG in the past with last PCI in 2014  7. Hypertension  8. Hyperlipidemia  9. Recent diagnosis of high-grade noninvasive urothelial bladder carcinoma on biopsy 6/29/2021    Plan:     1. Patient continues to be tachypneic with high FiO2 requirement on BiPAP/high flow nasal cannula. Patient is high risk for imminent decompensation. 2. Extra dose of Bumex and add Aldactone  3. Replace potassium  4. Follow with labs as ordered  5. Avoid nephrotoxic medications  6. Recommend to hold off of cardiac catheterization until stabilization of kidney function  7. Discussed with family. His prognosis guarded. They are going to change CODE STATUS after discussion with palliative care    Nutrition   Please ensure that patient is on a renal diet/TF. Avoid nephrotoxic drugs/contrast exposure. Thank you for the consultation. Please do not hesitate to contact us for any further questions/concerns. We will continue to follow along with you. Darrick Shepherd MD  Internal Medicine Resident, PGY-2  Nephrology service  [facility]; [place], [place]  8/5/2021,8:57 AM   Attending Physician Statement  I have discussed the care of Brad Cheng, including pertinent history and exam findings,  with the Fellow/Residentt. I have reviewed the key elements of all parts of the encounter with the Fellow/ Resident. I agree with the assessment, plan and orders as documented by the resident.     Valeria Martin MD MD, MRCP Katlin Marcelino, FAC   8/5/2021 2:42 PM    Nephrology [address] [place] [address]

## 2021-08-05 NOTE — PLAN OF CARE
The patient's [facility] and Cuate Chau wanted to meet again to discuss the patient's condition. I met with them again and spoke frankly about his co-morbidities and also about his quality of life going forward. They were questioning whether treatment would change his outcome and what would be the best option going forward. They did not want to prolong the patient's suffering and did not think the patient would want to be on life support if it would not improve quality of life. I asked if they wanted to pursue hospice, however, they were not yet ready for that option. They both agreed on [facility] with no intubation and would like to observe the patient for a couple days. If he does not improve, they may change to [facility]. I acknowledged their wishes. Both the daughter Won Pruitt and son signed the [facility] paperwork and it was placed in the chart. His code status will be changed in the EMR.        Brian Garcia MD  Hospice/Palliative Care Fellow  [facility], [place], [place]  8/5/2021 3:36 PM

## 2021-08-05 NOTE — PROGRESS NOTES
Physical Therapy        Physical Therapy Cancel Note      DATE: 2021    NAME: Catarino David  MRN: [id number]   : 1935      Patient not seen this date for Physical Therapy due to: Other: SpO2 saturation drops with minimal activity, not stable, agitated/not following commands.  Ck back       Electronically signed by Mihai Lorenzo PT on 2021 at 10:48 PM

## 2021-08-05 NOTE — PLAN OF CARE
Problem: Pain:  Description: Pain management should include both nonpharmacologic and pharmacologic interventions.   Goal: Pain level will decrease  Description: Pain level will decrease  8/5/2021 0208 by Blayne Georges RN  Outcome: Met This Shift  8/4/2021 1923 by Mary Fuentes RN  Outcome: Ongoing     Problem: Falls - Risk of:  Goal: Will remain free from falls  Description: Will remain free from falls  Outcome: Met This Shift     Problem: Respiratory:  Goal: Ability to maintain normal respiratory secretions will improve  Description: Ability to maintain normal respiratory secretions will improve  8/5/2021 0208 by Blayne Georges RN  Outcome: Ongoing     Problem: OXYGENATION/RESPIRATORY FUNCTION  Goal: Patient will maintain patent airway  8/5/2021 0208 by Blayne Georges RN  Outcome: Ongoing     Problem: OXYGENATION/RESPIRATORY FUNCTION  Goal: Patient will achieve/maintain normal respiratory rate/effort  Description: Respiratory rate and effort will be within normal limits for the patient  8/5/2021 0208 by Blayne Georges RN  Outcome: Ongoing     Problem: OXYGENATION/RESPIRATORY FUNCTION  Goal: Patient will achieve/maintain normal respiratory rate/effort  Description: Respiratory rate and effort will be within normal limits for the patient  8/5/2021 0208 by Blayne Georges RN  Outcome: Ongoing     Problem: HEMODYNAMIC STATUS  Goal: Patient has stable vital signs and fluid balance  Outcome: Ongoing

## 2021-08-05 NOTE — PROGRESS NOTES
[facility]  Internal Medicine Teaching Residency Program  Inpatient Daily Progress Note  ______________________________________________________________________________    Patient: Devi Bridges  YOB: 1935   QVR:[id number]    Acct: [de-identified]     Room: 18/0-65  Admit date: 8/2/2021  Today's date: 08/05/21  Number of days in the hospital: 3    SUBJECTIVE   CC: shortness of breath   Pt examined at bedside. Chart & results reviewed. - vitals are stable,   - Cr: 1.87; WBC: 24.8; K:3.1   - overnight he was very anxious and he received Geodon 10mg and Zyprexa 5mg PO.   - On my examination, pt is on BiPAP, saturation is above 90s, pt is anxious wants to get rid of his BiPAP. - Pt is intermittently on BIPAP and on HFNC. ROS:  Unable to obtain as pt was anxious and is on BiPAP. BRIEF HISTORY   The patient is a pleasant 82 y. o. male  with a PMH of CAD s/p triple CABG (20 yrs ago), s/p stent placement (2014), bladder cancer, HTN, s/p defibrillator placement (01/2017), COPD, arthritis and thyroid disease presents today to the ED with SOB for 3 days. It is exertional, relieves with rest. It is not associated with cough, or chest pain. He denies any orthopnea and PND. Prior to these, the patient has not been eating properly for a week, he does not feel hungry, and reports that he has lost around 10 lbs in the past week. Pt also c/o fever last night of around 100.4 for which he took tylenol. Afebrile on evaluation here.       He was diagnosed with bladder cancer on 4/2021 and has been depressed since then.  He had an appointment scheduled today with urology for further plan of treatment.      Pertinent labs were ordered.      Troponins: 269-> 300->415  Pro-BNP:4,625  WBC: 18.4      OBJECTIVE     Vital Signs:  BP (!) 140/74   Pulse 113   Temp 97.8 °F (36.6 °C) (Bladder)   Resp 24   Wt 178 lb (80.7 kg)   SpO2 98%   BMI 24.14 kg/m²     Temp (24hrs), Daily PRN  acetaminophen, 650 mg, Q6H PRN   Or  acetaminophen, 650 mg, Q6H PRN        Diagnostic Labs:  CBC:   Recent Labs     08/03/21  0608 08/04/21  0330 08/05/21  0641   WBC 16.4* 17.3* 24.8*   RBC 4.15* 4.39 4.48   HGB 12.7* 13.3 13.7   HCT 40.6* 41.9 41.6   MCV 97.8 95.4 92.9   RDW 15.3* 15.0* 14.9*    181 238     BMP:   Recent Labs     08/03/21  0608 08/04/21  0330 08/05/21  0641    137 139   K 4.1 3.9 3.1*    102 101   CO2 17* 18* 18*   BUN 33* 42* 57*   CREATININE 1.73* 1.61* 1.87*     BNP: No results for input(s): BNP in the last 72 hours. PT/INR:   Recent Labs     08/02/21  0859   PROTIME 12.1   INR 1.1     APTT:   Recent Labs     08/04/21  0330 08/04/21  1008 08/05/21  0641   APTT 64.6* 63.2* 62.5*     CARDIAC ENZYMES: No results for input(s): CKMB, CKMBINDEX, TROPONINI in the last 72 hours. Invalid input(s): CKTOTAL;3  FASTING LIPID PANEL:  Lab Results   Component Value Date    CHOL 198 04/04/2019    HDL 50 04/04/2019    TRIG 69 04/04/2019     LIVER PROFILE:   Recent Labs     08/02/21  0859   AST 33   ALT 11   BILITOT 1.22*   ALKPHOS 123      MICROBIOLOGY:   Lab Results   Component Value Date/Time    CULTURE NO SIGNIFICANT GROWTH 08/02/2021 11:47 AM       Imaging:    US RENAL COMPLETE    Result Date: 8/3/2021  No acute findings. No hydronephrosis. Ill-defined 2.3 cm hypoechoic lesion in the posterior aspect left kidney may represent the abnormality on the recent CT but cannot be accurately characterized and a small solid mass should be excluded. Recommend dedicated MRI or CT renal protocol when the patient's clinical condition allows. XR CHEST PORTABLE    Result Date: 8/4/2021  Persistent mixed interstitial and alveolar opacities in the right greater than left lungs not substantially changed relative to yesterday on a background of chronic appearing interstitial changes. CT CHEST PULMONARY EMBOLISM W CONTRAST    Result Date: 8/2/2021  No evidence of pulmonary embolism. Extensive pulmonary abnormalities, more right-sided as described above. 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 Small pleural effusions, more right-sided and extending along the right major fissure. Mild left heart chamber enlargement. No pericardial effusion. 1.9 cm exophytic left posterior renal lesion, not clearly cystic. Initial correlation with ultrasound recommended. Additional tiny high density cortical lesion on the right. Probable nonobstructing kidney stones. Low density focus within the lumen of the stomach, not clearly fluid. Intraluminal lipoma or other polypoid lesion possible. Small hiatus hernia. Upper GI or endoscopy should be considered. Prior granulomatous disease. Additional likely incidental findings, as detailed in the body of the report above. RECOMMENDATIONS: Pulmonology consultation. ASSESSMENT & PLAN     ASSESSMENT / PLAN:     Acute on chronic systolic heart failure  - Echo done in 2016 showed EF of 30-35%. - Pt has an AICD in place, pacer interrogation ordered. - Monitor strict I/O   - Switched from Lasix to Bumex. - Aldactone 25mg PO.   - Appreciate nephro recommendations. - Daily BMPs   - Echocardiogram to be done today.      Acute respiratory failure secondary to heart failure  - currently on BiPAP. - Maintain O2 sat above 92%. COVID- negative  - CT done on arrival to the ED it showed mild left heart chamber enlargement, extensive pulmonary abnormalities, small pleural effusions, no evidence of pulmonary embolism. -CXR done today showed continued B/L interstitial and LV alert opacities. - Pulmonology consulted.  Appreciate their recommendations     NSTEMI   -Troponins have been trending up 269-> 300->415-> 686  - continue heparin drip. - Continue to monitor troponins.  - Cardio consulted. Appreciate their recommendations. Acute Kidney Injury  -  Cr: 1.87  - Baseline Cr is 1.20-1.30  - Monitor closely.   - Nephro consulted. Appreciate their recommendations.      Leucocytosis suspected due to a lung source  - WBC: 24.8   - CT done on arrival to the ED it showed mild left heart chamber enlargement, extensive pulmonary abnormalities, small pleural effusions, no evidence of pulmonary embolism.   - Lactic acid: 2.4. Will continue to monitor.   -Procalcitonin: 4.18   - Urine culture showed no significant growth. Blood culture shows no growth at 3 days. - Continue Zithromax and Rocephin.      Coronary artery disease  - pt is s/p CABG x3    - pt had stent placement in    - On ASA 81mg at home   - Continue ASA and Lipitor.      Hypothyroidism  - On levothyroxine 50mcg at home   - continue here      Essential Hypertension  - Pt at home with Losartan 25mg   - Held for now. Resume when kidney function stabilizes. Incidental findin.9cm exophytic left posterior renal lesion on CT. So USG was ordered which showed ill-defined heterogenous but predominantly hypoechoic area in the posterior aspect of the left kidney measuring upto 2.3 x 2.2 x 1.7 cm which is difficult to visualize. Small solid mass should be excluded. They recommended MRI or CT renal protocol when pt's acute symptoms resolve. Jayson Page MD  PGY-1, Internal Medicine Resident  [address]         2021, 7:38 AM    I have discussed the care of Kaiden Smith , including pertinent history and exam findings,    today with the resident. I have seen and examined the patient and the key elements of all parts of the encounter have been performed by me . I agree with the assessment, plan and orders as documented by the resident.      Active Problems:    AICD (automatic cardioverter/defibrillator) present Shortness of breath    DVT (deep vein thrombosis) in pregnancy  Resolved Problems:    * No resolved hospital problems. *        Overall  course ;                                   show no change over time.         Patient remains critically sick  On high flow, BiPAP  Creatinine elevated  CODE STATUS changed to DNR CCA  Discussed in length with patient daughter at bedside          Electronically signed by Emir Youngblood MD

## 2021-08-05 NOTE — CONSULTS
Palliative Care Inpatient Consult    NAME:  Nieves Banerjee RECORD NUMBER:  [id number]  AGE: 82 y.o. GENDER: male  : 1935  TODAY'S DATE:  2021    Reasons for Consultation:    Provision of information regarding PC and/or hospice philosophies  Complex, time-intensive communication and interdisciplinary psychosocial support  Clarification of goals of care and/or assistance with difficult decision-making  Guidance in regards to resources and transition(s)    Members of PC team contributing to this consultation are :  Dr. Fleta Messick Dr. Dellis Klinefelter    History of Present Illness     The patient is a 82 y.o. man with a PMH, of CAD s/p CABG x3, stents, AICD, COPD, polymyalgia rheumaticarecently diagnosed bladder cancer (2021), presented with a 3-day history of TOMPKINS. CTPE was done in the ED which was negative for PE but showed extensive ground glass opacities with reticulations and peripheral honeycombing. Pulmonology consulted for evaluation. Troponins were elevated so Cardiology was consulted for NSTEMI with plans for cardiac cath. He has a history of CKD, and now with DARYL. Nephrology on board.        Referred to Palliative Care by   [x] Physician   [] Nursing  [] Family Request   [] Other:      Active Hospital Problems    Diagnosis Date Noted    AICD (automatic cardioverter/defibrillator) present [Z95.810] 2021    Shortness of breath [R06.02] 2021    DVT (deep vein thrombosis) in pregnancy [O22.30] 10/2014       PAST MEDICAL HISTORY      Diagnosis Date    Abnormal tilt table test 2016    POSITIVE AFTER SYNCOPAL EPISODE    Anxiety     Arthritis     Asthma     mild intermittent w/o complication    Bradycardia     CAD (coronary artery disease)     CHF (congestive heart failure) (HCC)     Chronic systolic (congestive) heart failure (HCC)     COPD (chronic obstructive pulmonary disease) (HCC)     Frequent PVCs     GERD (gastroesophageal reflux disease)     Heart attack ([facility] [address]     Hx of blood clots     clot in left leg 3 years ago    Hyperlipidemia     Hypertension     Kidney stones 2009? patient states passed    Neurocardiogenic syncope     On prednisone therapy     KENDELL (obstructive sleep apnea)     MILD KENDELL B SLEEP SUDY    Poor historian     Renal insufficiency     SOB (shortness of breath)     Syncopal episodes     Thyroid disease     hypothyroidism       PAST SURGICAL HISTORY  Past Surgical History:   Procedure Laterality Date    CARDIAC DEFIBRILLATOR PLACEMENT  2017    BI-V ICD DR Mary Daly    CARDIAC DEFIBRILLATOR PLACEMENT  2017    CRT    CARDIAC DEFIBRILLATOR PLACEMENT  2017    CRT    CARDIAC SURGERY  2000    CABG X3 LIM[place]-LAD, SVG-DIAG DBG-RCA DR. Syeda Estrada      CORONARY ANGIOPLASTY WITH STENT PLACEMENT      Nabeel Murillo    CYSTOSCOPY Right 10/06/2014    cysto with stent    CYSTOSCOPY  2019    transurethral resection bladder by Dr. Edilberto Mejia 2019    CYSTOSCOPY TRANSURETHRAL RESECTION BLADDER performed by Jana Barillas MD at [address] [place] [address] 2021    CYSTOSCOPY, DILITATION, CAUTERIZATION OF PROSTATE VARICES.  performed by Jana Barillas MD at [address] [facility] N/A 2020    CYSTOSCOPY performed by Jana Barillas MD at [place] [address] CATH LAB PROCEDURE  2005 - 2014    BOTH TIMES SHOWING ALL 3 GRAFTS PATENT WITH OCCLUDED LCX / ATTEMPTED PCI TO LCX WAS UNSUCCESSFUL    LITHOTRIPSY Right 2021    CYSTOSCOPY BILATERAL PYELOGRAM WITH  BLADDER BIOPSY AND CAUTERIZATION performed by Jana Barillas MD at [place] [address]  Social History     Tobacco Use    Smoking status: Former Smoker     Quit date: 1997     Years since quittin.7    Smokeless tobacco: Current User     Types: Chew   Vaping Use    Vaping Use: Never used   Substance Use Topics    Alcohol use: No     Comment: socially    Drug use: No       ALLERGIES  No Known Allergies      MEDICATIONS  Current Medications    OLANZapine  5 mg Oral Nightly    bumetanide  2 mg Intravenous Q12H    cefTRIAXone (ROCEPHIN) IV  1,000 mg Intravenous Q24H    azithromycin  250 mg Intravenous Q24H    methylPREDNISolone  40 mg Intravenous Q12H    metoprolol tartrate  12.5 mg Oral BID    aspirin  81 mg Oral Daily    [Held by provider] predniSONE  3 mg Oral Daily    levothyroxine  50 mcg Oral Daily    sodium chloride flush  5-40 mL Intravenous 2 times per day    atorvastatin  40 mg Oral Nightly     heparin (porcine), heparin (porcine), sodium chloride flush, sodium chloride, ondansetron **OR** ondansetron, polyethylene glycol, acetaminophen **OR** acetaminophen  IV Drips/Infusions   heparin (PORCINE) Infusion 20 Units/kg/hr (08/04/21 4731)    sodium chloride       Home Medications  No current facility-administered medications on file prior to encounter. Current Outpatient Medications on File Prior to Encounter   Medication Sig Dispense Refill    levothyroxine (SYNTHROID) 50 MCG tablet TAKE 1 TABLET BY MOUTH EVERY DAY  30 tablet 0    citalopram (CELEXA) 20 MG tablet TAKE 1 TABLET BY MOUTH EVERY DAY  90 tablet 0    losartan (COZAAR) 25 MG tablet TAKE 1 TABLET BY MOUTH ONE TIME A DAY  11    meclizine (ANTIVERT) 25 MG tablet Take 25 mg by mouth as needed for Dizziness       metoprolol tartrate (LOPRESSOR) 25 MG tablet Take 25 mg by mouth nightly  60 tablet 3    allopurinol (ZYLOPRIM) 100 MG tablet daily Indications: TAKES DAILY       predniSONE (DELTASONE) 1 MG tablet Take 3 mg by mouth daily       aspirin 81 MG tablet Take 81 mg by mouth daily.  nitroGLYCERIN (NITROSTAT) 0.4 MG SL tablet Place 0.4 mg under the tongue every 5 minutes as needed for Chest pain.          Data         /69   Pulse 105   Temp 98.6 °F (37 °C) (Temporal)   Resp (!) 38   Wt 178 lb (80.7 kg)   SpO2 (!) 89%   BMI 24.14 kg/m²     Wt Readings from Last 3 Encounters:   21 178 lb (80.7 kg)   21 178 lb (80.7 kg)   21 179 lb 1.6 oz (81.2 kg)        Code Status: Full Code     ADVANCED CARE PLANNING:  Patient has capacity for medical decisions: no  Health Care Power of : yes  Living Will: no     Personal, Social, and Family History  Marital Status:   Living situation: alone  Importance of cecilio/Gnosticist/spiritual beliefs: [] Very [x] Somewhat [] Not   Psychological Distress: moderate  Does patient understand diagnosis/treatment? no  Does caregiver understand diagnosis/treatment? yes    Past Medical History:   Diagnosis Date    Abnormal tilt table test 2016    POSITIVE AFTER SYNCOPAL EPISODE    Anxiety     Arthritis     Asthma     mild intermittent w/o complication    Bradycardia     CAD (coronary artery disease)     CHF (congestive heart failure) (HCC)     Chronic systolic (congestive) heart failure (HCC)     [facility]     Frequent PVCs     GERD (gastroesophageal reflux disease)     Heart attack ([facility] [address] 2000    Hx of blood clots     clot in left leg 3 years ago    Hyperlipidemia     Hypertension     Kidney stones ?     patient states passed    Neurocardiogenic syncope     On prednisone therapy     EKNDELL (obstructive sleep apnea)     MILD KENDELL B SLEEP SUDY    Poor historian     Renal insufficiency     SOB (shortness of breath)     Syncopal episodes     Thyroid disease     hypothyroidism         Family History   Problem Relation Age of Onset    Heart Attack Father        Social History     Tobacco Use    Smoking status: Former Smoker     Quit date: 1997     Years since quittin.7    Smokeless tobacco: Current User     Types: Chew   Vaping Use    Vaping Use: Never used   Substance Use Topics    Alcohol use: No     Comment: socially    Drug use: No           Assessment        REVIEW OF SYSTEMS  Constitutional: no fever, no chills. Positive for fatigue  Eyes: no eye pain or blurred vision  ENT: no hearing loss, congestion, or difficulty swallowing   Respiratory: positive for shortness of breath   Cardiovascular: no chest pain or pressure, no palpitations   Gastrointestinal: no nausea, vomiting, abdominal pain, diarrhea or constipation, no melena   Genitourinary: no dysuria, frequency, hematuria, or nocturia   Musculoskeletal: no myalgias or arthralgias, no back pain   Skin: no rashes or sores   Neurological: no focal weakness, numbness, tingling or headache, no seizures    PHYSICAL ASSESSMENT:  Constitutional: Awake, confused. In distress on Bipap  Head: Normocephalic and atraumatic. Eyes: EOM are normal. Pupils are equal, round. Neck: Normal range of motion. Neck supple. No tracheal deviation present. Cardiovascular: Normal rate and regular rhythm, S1, S2, no murmur. Pulmonary/Chest: diminished breath sounds. Use of accessory muscles. On Bipap  Abdomen: Soft. No tenderness, not distended, no ascites, no organomegaly. Musculoskeletal: Normal range of motion. No edema lower ext. Neurological: CN II-XII grossly intact, no focal neurological deficits. Skin: Normal turgor, no bleeding, no bruising. Palliative Performance Scale:  ___60%  Ambulation reduced; Significant disease; Can't do hobbies/housework; intake normal or reduced; occasional assist; LOC full/confusion  ___50%  Mainly sit/lie; Extensive disease; Can't do any work; Considerable assist; intake normal or reduced; LOC full/confusion  _x_40%  Mainly in bed; Extensive disease; Mainly assist; intake normal or reduced; LOC full/confusion   ___30%  Bed Bound; Extensive disease; Total care; intake reduced; LOC full/confusion  ___20%  Bed Bound; Extensive disease; Total care; intake minimal; Drowsy/coma  ___10%  Bed Bound; Extensive disease;  Total care; Mouth care only; Drowsy/coma  ___0       Death      Plan      Palliative Interaction:    Reminded the patient, his daughter and son today. We discussed our role to get comfort and support to the patient and the family. We also briefly described his overall condition so that they understood the gravity of the patient's disease process. During the interaction, Dr. Lauren Rodríguez, Nephrologist, came in and also spoke with the patient and the family. He recommended against dialysis if it came to that because of his multiple co morbidities. The daughter stated that her father would not want to live like this and would not want to be on life support. However, she did not want to make the decision on her own even though she has healthcare POA. Wendy Childers wanted some time to think about the options available. We described the various code statuses, however, they wanted to wait some time and think about it. We informed them that we will be available for continued support whenever they need. Advance Care Planning     Advance Care Planning (ACP) Physician/NP/PA (Provider) Cindy De Souza      Date of ACP Conversation: 8/2/2021    Conversation Conducted with:      Healthcare Decision Maker: Named in Advance Directive or Healthcare Power of  (name) [address] Decision Maker: Blas Rodríguez [phone]    Current Designated Health Care Decision Maker:  Blas Rodríguez  Second alternate: Lexii De La Cruz [phone]    Note: Assess and validate information in current ACP documents, as indicated. Care Preferences:    Hospitalization: \"If your health worsens and it becomes clear that your chance of recovery is unlikely, what would your preference be regarding hospitalization? \"  If the patient would want hospitalization, answer \"yes\". If the patient would prefer comfort-focused treatment without hospitalization, answer \"no\". YES/NO/WILD CARDS: yes      Ventilation:   \"If you were in your present state of health and suddenly became very ill and were unable to breathe on your own, what would your preference be about the use of a ventilator (breathing machine) if it was available to you? \"    If patient would desire the use of a ventilator (breathing machine), answer \"yes\", if not answer \"no\":yes    \"If your health worsens and it becomes clear that your chance of recovery is unlikely, what would your preference be about the use of a ventilator (breathing machine) if it was available to you? \"   yes    Resuscitation:  \"CPR works best to restart the heart when there is a sudden event, like a heart attack, in someone who is otherwise healthy. Unfortunately, CPR does not typically restart the heart for people who have serious health conditions or who are very sick. \"    \"In the event your heart stopped as a result of an underlying serious health condition, would you want attempts to be made to restart your heart (answer \"yes\" for attempt to resuscitate) or would you prefer a natural death (answer \"no\" for do not attempt to resuscitate)? \"   yes     NOTE: If the patient has a valid advance directive AND provides care preference(s) that are inconsistent with that prior directive, advise the patient to consider either: creating a new advance directive that complies with state-specific requirements; or, if that is not possible, orally revoking that prior directive in accordance with state-specific requirements, which must be documented in the EHR. Conversation Outcomes / Follow-Up Plan:   will follow up with the daughter and the son about what they decide concerning the goals of care and code status      Length of Voluntary ACP Conversation in minutes:  36 minutes    Luis Oden MD          Education/support to family  Education/support to patient  Communications with primary service  Managing depression/anxiety  Fear of death or dying  Identifying hopelessness  Continued communication updates    Principle Problem/Diagnosis:  Active Problems:    Shortness of breath    Additional Assessments:   Active Problems:   DARYL  AICD (automatic cardioverter/defibrillator) present  Shortness of breath  H/o DVT (deep vein thrombosis)  CHF    1- Symptom management/ pain control     Pain Assessment:  The patient is not having any pain. Anxiety:  present                          Dyspnea:  acute dyspnea                          Fatigue:  present      2- Goals of care evaluation   The patient goals of care are improve or maintain function/quality of life, provide comfort care/support/palliation/relieve suffering, strengthening relationships and support for family/caregiver   Goals of care discussed with:    [] Patient independently    [x] Patient and Family    [] Family or Healthcare DPOA independently    [] Unable to discuss with patient, family/DPOA not present    3- Code Status  Full Code    4- Other recommendations   - We will continue to provide comfort and support to the patient and the family  - will follow up on the goals of care and code status with [facility] and Ilia [facility]a    Please call with any palliative questions or concerns. Palliative Care Team is available via perfect serve or via phone. Palliative Care will continue to follow Mr. Thayer's care as needed. Thank you for allowing Palliative Care to participate in the care of Mr. Mariano Soriano . This note has been dictated by dragon, typing errors may be a possibility. The total time I spent in seeing the patient, discussing goals of care, advanced directives, code status, greater than 50% time in counseling and other major issues was more than 60 minutes      Electronically signed by      Debbie Clement MD  Hospice/Palliative Care Fellow  [address] [facility], [facility]  8/5/2021 11:33 AM        ATTENDING ATTESTATION:    I have discussed the care of Frank Fuentes, including pertinent history and exam findings, with the resident/fellow/NP. I have seen and examined the patient and the key elements of all parts of the encounter have been performed by me.   I agree with the assessment, plan and orders as documented by the fellow/resident/NP, after I modified the exam findings and the plan of treatments and the final version is my approved version of the assessment.       Additional Comments:   Patient's current medical conditions were discussed in detail with patient's daughter Jemma Cunningham who also is patient's POA for her and son Miguel Rodriguez  Different types of codes status were explained  Patient's goals of care were discussed  We will follow-up with CODE STATUS discussion and further goals of care with the patient and family  We will continue to provide comfort and support to the patient and family      Electronically signed by Luis Herrera MD on 8/5/2021 at 3:14 PM    Palliative care office: [phone]

## 2021-08-06 NOTE — DEATH NOTES
Death Pronouncement Note  Patient's Name: Frank Fuentes   Patient's YOB: 1935  MRN Number: [id number]    Admitting Provider: Maria Teresa Nascimento MD  Attending Provider: Maria Teresa Nascimento MD    Patient was examined and the following were absent: Pulses, Blood Pressure and Respiratory effort    I declared the patient dead on  8/06/2021 at 06:18 PM    Preliminary Cause of Death:   Cardio pulmonary Distress. Code Status: DNR CC.     Electronically signed by Suman Lui MD on 8/6/21 at 6:50 PM EDT

## 2021-08-06 NOTE — PLAN OF CARE
Problem: Pain:  Goal: Pain level will decrease  Description: Pain level will decrease  Outcome: Ongoing     Problem: Nutritional:  Goal: Nutritional status will improve  Description: Nutritional status will improve  Outcome: Ongoing     Problem: Respiratory:  Goal: Ability to maintain normal respiratory secretions will improve  Description: Ability to maintain normal respiratory secretions will improve  Outcome: Ongoing     Problem: OXYGENATION/RESPIRATORY FUNCTION  Goal: Patient will maintain patent airway  Outcome: Ongoing

## 2021-08-06 NOTE — PROGRESS NOTES
PALLIATIVE CARE PROGRESS NOTE     Patient: Sandi Vázquez    Room: 2644/5244-16    Reason For Consult:  Goals of care evaluation  Distress management  Guidance and support  Facilitate communications  Recommendations for the above      HISTORY OF PRESENT ILLNESS:   The patient is a 82 y.o. man with a PMH, of CAD s/p CABG x3, stents, AICD, COPD, polymyalgia rheumaticarecently diagnosed bladder cancer (4/2021), presented with a 3-day history of TOMPKINS. CTPE was done in the ED which was negative for PE but showed extensive ground glass opacities with reticulations and peripheral honeycombing. Pulmonology consulted for evaluation. Troponins were elevated so Cardiology was consulted for NSTEMI with plans for cardiac cath. He has a history of CKD, and now with DARYL. Nephrology on board. OVERNIGHT EVENTS:  No acute events overnight. He continues to require continuous BiPAP with FiO2 100%. His oxygen saturations remain mid 80s. He is confused and is not quite redirectable. It is difficult to understand him as he is on BiPAP continuously. When it is removed, his oxygen saturations dropped to 70s. Review of Systems   Constitutional: Positive for fatigue. Negative for fever. HENT: Negative for congestion. Respiratory: Positive for shortness of breath. Negative for wheezing. Cardiovascular: Negative for chest pain, palpitations and leg swelling. Gastrointestinal: Negative for abdominal pain. Genitourinary: Negative for dysuria. Neurological: Negative for syncope, light-headedness and headaches. Psychiatric/Behavioral: Positive for agitation.         Vital Signs:  /85   Pulse 108   Temp 97.8 °F (36.6 °C) (Axillary)   Resp (!) 49   Wt 178 lb (80.7 kg)   SpO2 (!) 85%   BMI 24.14 kg/m²     Pertinent Laboratory StudiesReviewed by Me Personally Today:  Lab Results   Component Value Date    WBC 25.2 (H) 08/06/2021    HGB 14.0 08/06/2021    HCT 44.2 08/06/2021    MCV 95.3 08/06/2021     2021     Lab Results   Component Value Date     2021    K 3.4 2021     2021    CO2 17 2021    BUN 65 2021    CREATININE 1.80 2021    GLUCOSE 141 2021    GLUCOSE 122 2012    CALCIUM 8.2 2021         has a past medical history of Abnormal tilt table test, Anxiety, Arthritis, Asthma, Bradycardia, CAD (coronary artery disease), CHF (congestive heart failure) ([facility] Chronic systolic (congestive) heart failure ([facility] COPD (chronic obstructive pulmonary disease) ([facility] Frequent PVCs, GERD (gastroesophageal reflux disease), Heart attack ([facility] Hx of blood clots, Hyperlipidemia, Hypertension, Kidney stones, Neurocardiogenic syncope, On prednisone therapy, KENDELL (obstructive sleep apnea), Poor historian, Renal insufficiency, SOB (shortness of breath), Syncopal episodes, and Thyroid disease.     Family History   Problem Relation Age of Onset    Heart Attack Father        Social History     Tobacco Use    Smoking status: Former Smoker     Quit date: 1997     Years since quittin.7    Smokeless tobacco: Current User     Types: Chew   Vaping Use    Vaping Use: Never used   Substance Use Topics    Alcohol use: No     Comment: socially    Drug use: No         Patient Active Problem List   Diagnosis    S/P CABG x 3 ()    CHF (congestive heart failure), NYHA class II ([facility]    S/P cardiac cath (14-Dr. Tammy Rollins)    Anxiety    CAD (coronary artery disease)    Hyperlipemia    Stented coronary artery    GERD (gastroesophageal reflux disease)    Ureteral calculus, right    IFG (impaired fasting glucose)    Renal insufficiency    Essential hypertension    Mobility impaired    Mild intermittent asthma without complication    S/P implantation of automatic cardioverter/defibrillator (AICD)-CRT 17 - Dr. Tammy Rollins    Bladder polyp    Pneumococcal vaccination declined    Influenza vaccination declined    Polymyalgia rheumatica ([facility]  On prednisone therapy    Neurocardiogenic syncope    Hypothyroid    AICD (automatic cardioverter/defibrillator) present    Shortness of breath    DVT (deep vein thrombosis) in pregnancy    Acute hypoxemic respiratory failure ([address]    Community acquired pneumonia of right lung       Physical Exam  Vitals and nursing note reviewed. Constitutional:       General: He is in acute distress. Appearance: He is well-developed. He is not diaphoretic. HENT:      Head: Normocephalic and atraumatic. Eyes:      General:         Right eye: No discharge. Left eye: No discharge. Pupils: Pupils are equal, round, and reactive to light. Neck:      Trachea: No tracheal deviation. Cardiovascular:      Rate and Rhythm: Tachycardia present. Heart sounds: No murmur heard. Pulmonary:      Breath sounds: No wheezing. Comments: On continuous BiPAP. Crackles present  Abdominal:      General: Bowel sounds are normal.      Palpations: Abdomen is soft. Tenderness: There is no abdominal tenderness. Musculoskeletal:         General: Normal range of motion. Cervical back: Normal range of motion and neck supple. Skin:     General: Skin is warm and dry. Coloration: Skin is not pale. Findings: No rash. Neurological:      General: No focal deficit present. Motor: No abnormal muscle tone. Deep Tendon Reflexes: Reflexes normal.   Psychiatric:      Comments: Agitated          Palliative Performance Scale:  ___60%  Ambulation reduced;Significant disease; Can't do hobbies/housework; intake normal or reduced; occasional assist; LOC full/confusion  ___50%  Mainly sit/lie; Extensive disease; Can't do any work; Considerable assist; intake normal orreduced; LOC full/confusion  ___40%  Mainly in bed; Extensive disease; Mainly assist; intake normal or reduced; LOC full/confusion   _x_30%  Bed Bound; Extensive disease;  Total care; intake reduced; LOCfull/confusion  ___20%  Bed Bound; Extensive disease; Total care; intake minimal; Drowsy/coma  ___10%  Bed Bound; Extensive disease; Total care; Mouth care only; Drowsy/coma  ___0       Death    Palliative Interaction:  Prior to my visit, the RN told me that the patient's children were a little bit distressed. A friend of the patient was visiting and was disagreeing with the children's decision for palliative care and the possibility of hospice. They entered the room, I introduced myself to the friend. With pressure from the family, I asked (if she had any questions or needed any explanations. She said no and decided to leave at that point. I then spoke with Wendy Childers and Mana Joe and they seemed a little distressed. They wanted to make the patient DNR CC however, they felt that the friend would blame them for stopping care. They wanted to know if the patient was terminal into that they can justify the decision I explained to them again. The gravity of the patient's condition and that he was indeed terminal.  They also agreed that he was however, they wanted some time to sit with the patient before making any decision. They became tearful and the nurse and myself comforted them. Mana Joe wanted to be sure that Wendy Childers was okay and ready to make the decision to change the code. I told him that he can have time to sit with the patient and there was no rush to make any changes. They were thankful for the support. All of their questions were answered. We then left the room to get them done along with the patient.      Principle Problem/Diagnosis:    Chest pain, shortness of breath    Active Problems:    S/P CABG x 3 (2009)    CHF (congestive heart failure), NYHA class II (HCC)    CAD (coronary artery disease)    Essential hypertension    Polymyalgia rheumatica ([facility])    AICD (automatic cardioverter/defibrillator) present    Shortness of breath    DVT (deep vein thrombosis) in pregnancy    Acute hypoxemic respiratory failure ([facility]    Community off with the help of comfort care medications and all comfort care medications have been ordered  We explained to the family that patient may not be there for longer period of time once his BiPAP is weaned off and family stated that they understand  I explained about end-of-life signs and symptoms to the family  We assured the family that patient will receive all comfort care medications  We also explained to the family that hospice care can be discussed once patient is off the BiPAP and see how stable is the patient clinically  We offered comfort and emotional support to the patient and family  All comfort care medications were ordered for the patient  We will continue to provide comfort and support to the patient and family      Electronically signed by Gauri Dykes MD on 8/6/2021 at 1:33 PM      [place] [id number] [phone]  Palliative Care Cell Phone: [phone]

## 2021-08-06 NOTE — PLAN OF CARE
RN  Outcome: Ongoing  8/6/2021 0538 by Michael Zimmerman RN  Outcome: Ongoing  Goal: Complications related to intravenous access or infusion will be avoided or minimized  Description: Complications related to intravenous access or infusion will be avoided or minimized  8/6/2021 0753 by Eron Andrea RN  Outcome: Ongoing  8/6/2021 0538 by Michael Zimmerman RN  Outcome: Ongoing  Goal: Will show no infection signs and symptoms  Description: Will show no infection signs and symptoms  8/6/2021 0753 by Eron Andrea RN  Outcome: Ongoing  8/6/2021 0538 by Michael Zimmerman RN  Outcome: Ongoing  Goal: Absence of new skin breakdown  Description: Absence of new skin breakdown  8/6/2021 0753 by Eron Andrea RN  Outcome: Ongoing  8/6/2021 0538 by Michael Zimmerman RN  Outcome: Ongoing     Problem: OXYGENATION/RESPIRATORY FUNCTION  Goal: Patient will maintain patent airway  8/6/2021 0753 by Eron Andrea RN  Outcome: Ongoing  8/6/2021 0538 by Michael Zimmerman RN  Outcome: Ongoing  8/5/2021 2017 by Chris Turner RN  Outcome: Ongoing  Goal: Patient will achieve/maintain normal respiratory rate/effort  Description: Respiratory rate and effort will be within normal limits for the patient  Outcome: Ongoing     Problem: HEMODYNAMIC STATUS  Goal: Patient has stable vital signs and fluid balance  Outcome: Ongoing     Problem: FLUID AND ELECTROLYTE IMBALANCE  Goal: Fluid and electrolyte balance are achieved/maintained  Outcome: Ongoing     Problem: ACTIVITY INTOLERANCE/IMPAIRED MOBILITY  Goal: Mobility/activity is maintained at optimum level for patient  8/6/2021 0753 by Eron Andrea RN  Outcome: Ongoing  8/6/2021 0538 by Michael Zimmerman RN  Outcome: Ongoing     Problem: Falls - Risk of:  Goal: Will remain free from falls  Description: Will remain free from falls  8/6/2021 0753 by Eron Andrea RN  Outcome: Ongoing  8/6/2021 0538 by Michael Zimmerman RN  Outcome: Ongoing  Goal: Absence of physical injury  Description: Absence of

## 2021-08-06 NOTE — PROGRESS NOTES
1800 apneic breathing pattern    1818 no hear trate heard, no breaths taken in over one minute. Dr Marquita Brady and Dr Dianelys Wren notified. Spiritual care, Laurel Horvath, notified and at bedside. [address] Marquita Brady and Dianelys Wren at bedside to confirm death    Family at bedside.

## 2021-08-06 NOTE — PLAN OF CARE
Problem: Pain:  Goal: Pain level will decrease  Description: Pain level will decrease  8/6/2021 0538 by Juju Mauricio RN  Outcome: Ongoing     Problem: Nutritional:  Goal: Nutritional status will improve  Description: Nutritional status will improve  8/6/2021 0538 by Juju Mauricio RN  Outcome: Ongoing     Problem: Physical Regulation:  Goal: Diagnostic test results will improve  Description: Diagnostic test results will improve  Outcome: Ongoing     Problem: Physical Regulation:  Goal: Will remain free from infection  Description: Will remain free from infection  Outcome: Ongoing     Problem: Respiratory:  Goal: Ability to maintain normal respiratory secretions will improve  Description: Ability to maintain normal respiratory secretions will improve  8/6/2021 0538 by Juju Mauricio RN  Outcome: Ongoing     Problem: Skin Integrity:  Goal: Demonstration of wound healing without infection will improve  Description: Demonstration of wound healing without infection will improve  Outcome: Ongoing     Problem: Skin Integrity:  Goal: Complications related to intravenous access or infusion will be avoided or minimized  Description: Complications related to intravenous access or infusion will be avoided or minimized  Outcome: Ongoing     Problem: Skin Integrity:  Goal: Will show no infection signs and symptoms  Description: Will show no infection signs and symptoms  Outcome: Ongoing     Problem: Skin Integrity:  Goal: Absence of new skin breakdown  Description: Absence of new skin breakdown  Outcome: Ongoing     Problem: OXYGENATION/RESPIRATORY FUNCTION  Goal: Patient will maintain patent airway  8/6/2021 0538 by Juju Mauricio RN  Outcome: Ongoing     Problem: ACTIVITY INTOLERANCE/IMPAIRED MOBILITY  Goal: Mobility/activity is maintained at optimum level for patient  Outcome: Ongoing     Problem: Falls - Risk of:  Goal: Will remain free from falls  Description: Will remain free from falls  Outcome: Ongoing     Problem: Falls - Risk of:  Goal: Absence of physical injury  Description: Absence of physical injury  Outcome: Ongoing

## 2021-08-06 NOTE — FLOWSHEET NOTE
08/06/21 1703   Encounter Summary   Services provided to: Family   Referral/Consult From: Multi-disciplinary team   Support System Children   Place of Scientology None   Contact Yazidi No   Continue Visiting   (8/6/21)   Complexity of Encounter Low   Length of Encounter 15 minutes   Spiritual Assessment Completed Yes   Grief and Life Adjustment   Type End of life;Grief and loss   Assessment Calm; Approachable;Coping   Intervention Active listening;Explored feelings, thoughts, concerns;Nurtured hope;Sustaining presence/ Ministry of presence; Discussed belief system/Pentecostalism practices/cecilio   Outcome Comfort;Expressed gratitude

## 2021-08-06 NOTE — PROGRESS NOTES
Physician Progress Note      PATIENT:               Tremaine Mason  CSN #:                  [id number]  :                       1935  ADMIT DATE:       2021 8:48 AM  DISCH DATE:  RESPONDING  PROVIDER #:        Marcello Cole          QUERY TEXT:    Patient admitted with SOB for 3 days, noted to have on CT Chest Extensive   pulmonary abnormalities , various interstitial pneumonias, probably most   likely. If possible, please document in progress notes and discharge summary   if you are evaluating and/or treating any of the following: The medical record reflects the following:  Risk Factors: CHF, COPD,  Clinical Indicators: WBC: 18.4, Pulse 104 , Temp 97.4 ? F (36.3 ? C) (Oral)  ,   Resp (!) 33  , SpO2 80%>81%> 90%>87>96 , 90% on BiPAP  FiO2 50  Treatment: IV  Zithromax, IV Rocephin,  IV lasix, BIpap    Thank you, Please call if questions  Yamileth Lentz RN CDS  [phone]  Options provided:  -- Pneumonia  -- Pneumonia  not clinically significant  -- Other - I will add my own diagnosis  -- Disagree - Not applicable / Not valid  -- Disagree - Clinically unable to determine / Unknown  -- Refer to Clinical Documentation Reviewer    PROVIDER RESPONSE TEXT:    Pneumonia is not clinically significant.     Query created by: Tacho Joseph on 8/3/2021 12:06 PM      Electronically signed by:  Marcello Cole 8/3/2021 4:54 PM

## 2021-08-06 NOTE — PROGRESS NOTES
PULMONARY & CRITICAL CARE MEDICINE PROGRESS  NOTE     Patient:  Danielle Nelson  MRN: [id number]  Admit date: 8/2/2021  Primary Care Physician: Jona Hernandez MD  Consulting Physician: Rosie Wagner MD  CODE Status: DNR-CCA  LOS: 4    SUBJECTIVE     I personally interviewed/examined the patient, reviewed interval history and interpreted all available radiographic, laboratory data at the time of service. Chief Compliant/Reason for Initial Consult:       Brief Hospital Course: The patient is a 82 y.o. male history of coronary artery disease/status post CABG/chronic systolic heart failure, HFrEF, ischemic cardiomyopathy status post AICD, chronic kidney disease, history of smoking but no definite prior diagnosis of COPD per patient, history of asbestos exposure and according to patient was told in the past about asbestos affected the lung but he was never diagnosed with fibrosis of lung. Not on home oxygen. According to patient he has been having worsening shortness of breath for last 2 weeks he does not think that he had dyspnea shortness of breath about a month ago. He does not have cough. Denies wheezing. He does have chronic orthopnea without much change. He does not have chest pain and denies pedal edema. He does not have hemoptysis and does not have sputum production. He shortness of breath got acutely worse and he presented to emergency room found to be hypoxic. Venous blood gas was consistent with respiratory alkalosis and metabolic acidosis. His proBNP was elevated more than 4000. His troponins were elevated more than 500.   He had a CT scan of the chest done which showed bilateral groundglass changes but much more prominent on the right side and asymmetric along with interburst fibrotic changes groundglass changes/pulmonary filtrate much more right greater than the left along with traction bronchiectatic changes not limited to Resp  Av.5  Min: 23  Max: 49   O2 SAT SpO2  Av.5 %  Min: 85 %  Max: 86 %   OXYGEN DELIVERY No data recorded     Systemic Examination:   Physical Exam  General appearance - looks confused and in distress very tachypneic tachycardic  Mental status -disoriented confused   eyes - pupils equal and reactive, extraocular eye movements intact  Mouth -on BiPAP mask  Neck - supple, no significant adenopathy  Chest -air entry is bilaterally reduced with bibasilar crackles which are transitory and scattered rhonchi  Heart -S1-S2 difficult to hear distant heart sounds.   Abdomen - soft, nontender, nondistended, no masses or organomegaly  Neurological -is arousable disoriented, move extremities, no focal findings or movement disorder noted  Extremities - peripheral pulses normal, no pedal edema, no clubbing or cyanosis  Skin - normal coloration and turgor, no rashes, no suspicious skin lesions noted     DATA REVIEW     Medications:  Scheduled Meds:   spironolactone  25 mg Oral Daily    OLANZapine  5 mg Oral Nightly    bumetanide  2 mg Intravenous Q12H    cefTRIAXone (ROCEPHIN) IV  1,000 mg Intravenous Q24H    methylPREDNISolone  40 mg Intravenous Q12H    metoprolol tartrate  12.5 mg Oral BID    aspirin  81 mg Oral Daily    [Held by provider] predniSONE  3 mg Oral Daily    levothyroxine  50 mcg Oral Daily    sodium chloride flush  5-40 mL Intravenous 2 times per day    atorvastatin  40 mg Oral Nightly     Continuous Infusions:   heparin (PORCINE) Infusion 20 Units/kg/hr (21 1933)    sodium chloride       LABS:-  ABG:   Recent Labs     21  2131   POCPH 7.459*   POCPCO2 28.8*   POCPO2 64.8*   POCHCO3 20.4*   UKLP4HYH 94     CBC:   Recent Labs     21  0330 21  0641 21  0611   WBC 17.3* 24.8* 25.2*   HGB 13.3 13.7 14.0   HCT 41.9 41.6 44.2   MCV 95.4 92.9 95.3    238 233   LYMPHOPCT 2* 1* 1*   RBC 4.39 4.48 4.64   MCH 30.3 30.6 30.2   MCHC 31.7 32.9 31.7   RDW 15.0* 14.9* 14.9*     BMP:   Recent Labs     08/04/21  0330 08/05/21  0641 08/06/21  0611    139 144   K 3.9 3.1* 3.4*    101 102   CO2 18* 18* 17*   BUN 42* 57* 65*   CREATININE 1.61* 1.87* 1.80*   GLUCOSE 134* 162* 141*     Liver Function Test:   Recent Labs     08/04/21  1008   PROT 5.2*     Amylase/Lipase:  No results for input(s): AMYLASE, LIPASE in the last 72 hours. Coagulation Profile:   Recent Labs     08/04/21  1008 08/05/21  0641 08/06/21  0611   APTT 63.2* 62.5* 70.2*     Cardiac Enzymes:  No results for input(s): CKTOTAL, CKMB, CKMBINDEX, TROPONINI in the last 72 hours. Lactic Acid:  No results found for: LACTA  BNP:   No results found for: BNP  D-Dimer:  No results found for: DDIMER  Others:   Lab Results   Component Value Date    TSH 5.35 (H) 08/03/2021     Lab Results   Component Value Date    ANNEMARIE NEGATIVE 08/04/2021    SEDRATE 4 04/30/2020    CRP 1.1 04/30/2020     Lab Results   Component Value Date    URICACID 5.0 04/30/2020     No results found for: IRON, TIBC, FERRITIN  No results found for: SPEP, UPEP  Lab Results   Component Value Date    PSA 0.31 12/03/2019       Input/Output:    Intake/Output Summary (Last 24 hours) at 8/6/2021 1146  Last data filed at 8/6/2021 1111  Gross per 24 hour   Intake 516 ml   Output 1600 ml   Net -1084 ml       Microbiology:  No results for input(s): SPECDESC, SPECDESC, SPECIAL, CULTURE, CULTURE, STATUS, ORG, CDIFFTOXPCR, CAMPYLOBPCR, SALMONELLAPC, SHIGAPCR, SHIGELLAPCR, MPNEUG, MPNEUM, LACTOQL in the last 72 hours. Pathology:    Radiology reports:  XR CHEST PORTABLE   Final Result   Bilateral pulmonary opacities are similar to prior radiograph with suspected   small right pleural effusion. XR CHEST PORTABLE   Final Result   Little change from prior study. Continued bilateral interstitial and LV   alert opacities. Postsurgical changes with indwelling pacemaker.          XR CHEST PORTABLE   Final Result   Persistent mixed interstitial and alveolar opacities in the right greater   than left lungs not substantially changed relative to yesterday on a   background of chronic appearing interstitial changes. US RENAL COMPLETE   Final Result   No acute findings. No hydronephrosis. Ill-defined 2.3 cm hypoechoic lesion in the posterior aspect left kidney may   represent the abnormality on the recent CT but cannot be accurately   characterized and a small solid mass should be excluded. Recommend dedicated   MRI or CT renal protocol when the patient's clinical condition allows. CT CHEST PULMONARY EMBOLISM W CONTRAST   Final Result   No evidence of pulmonary embolism. Extensive pulmonary abnormalities, more right-sided as described above. 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 Small pleural effusions, more right-sided and extending along the   right major fissure. Mild left heart chamber enlargement. No pericardial effusion. 1.9 cm exophytic left posterior renal lesion, not clearly cystic. Initial   correlation with ultrasound recommended. Additional tiny high density   cortical lesion on the right. Probable nonobstructing kidney stones. Low density focus within the lumen of the stomach, not clearly fluid. Intraluminal lipoma or other polypoid lesion possible. Small hiatus hernia. Upper GI or endoscopy should be considered. Prior granulomatous disease. Additional likely incidental findings, as detailed in the body of the report   above. RECOMMENDATIONS:   Pulmonology consultation.              Echocardiogram:   Results for orders placed during the hospital encounter of 08/02/21    ECHO Complete 2D W Doppler W Color    Narrative  Transthoracic Echocardiography Report (TTE)    Patient Name Bony Serrato      Date of Study               08/05/2021  Reather Karen    Date of      1935  Gender                      Male  Birth    Age          82 year(s)  Race                            Room Number  0422        Height:                     72 inch, 182.88 cm    Corporate ID [id number]    Weight:                     178 pounds, 80.7 kg  #    Patient Acct [de-identified]   BSA:          2.03 m^2      BMI:      24.14  #                                                              kg/m^2    MR #         [id number]     Sonographer                 Sheebaele Aguilar    Accession #  [id number]  Interpreting Physician      Nolan Kaiser 63    Fellow                   Referring Nurse  Practitioner    Interpreting             Referring Physician         Naomy Roberto *  Fellow    Additional Comments  Technically difficult study. Type of Study    TTE procedure:2D Echocardiogram, M-Mode, Doppler, Color Doppler. Procedure Date  Date: 08/05/2021 Start: 12:36 PM    Study Location: [facility]    Indications:Atrial fibrillation. History / Tech. Comments:  AICD, Pulmonary Edema, CAD/CABG , Respiratory Failure    Patient Status: Inpatient    Height: 72 inches Weight: 178 pounds BSA: 2.03 m^2 BMI: 24.14 kg/m^2    Allergies  - *No Known Allergies. CONCLUSIONS    Summary  Technically difficult study  LV appears mildly dilated, normal wall thickness. Abnormal septal wall motion  Moderately reduced LV systolic function with LVEF 35%. Normal RV size and function. RV systolic pressure 53 mmHg  ICD leads noted in RV  LA appears mildly dilated  No obvious significant structural valvular abnormality noted. Mild to moderate MR  Normal aortic root dimension. No significant pericardial effusion noted.   IVC normal diameter and inspiratory variation Carolee Roman  ----------------------------------------------------------------------------  Electronically signed by Geraldine Auguste(Sonographer) on 2021 01:37  PM  ----------------------------------------------------------------------------    ----------------------------------------------------------------------------  Electronically signed by Antonieta Goldsmith(Interpreting physician) on  2021 11:29 AM  ----------------------------------------------------------------------------  FINDINGS  Left Atrium  Left atrium is mildly dilated. Left Ventricle  Left ventricle is in the upper limits of normal in size with reduced  function. Ejection fraction is difficult to evaluate due to rapid and irregular heart  rate. Estimated ejection fraction is 35% . Right Atrium  Right atrium is normal in size. Pacing lead seen in the right atrium. Right Ventricle  Pacemaker / ICD lead seen in right ventricle. Normal right ventricular size. Mitral Valve  Mitral valve structure is normal.  Mild to moderate mitral regurgitation. Vena Contract width 0.45 cm  Aortic Valve  Aortic valve is trileaflet. Aortic leaflet calcification without significant stenosis. Tricuspid Valve  Tricuspid valve structure is normal.  Moderate tricuspid regurgitation. Estimated right ventricular systolic pressure is 53 mmHg. Pericardial Effusion  No significant pericardial effusion is seen. Miscellaneous  Normal aortic root dimension.   IVC diameter and inspiratory collapse is normal.    M-mode / 2D Measurements & Calculations:    LVIDd:5.8 cm(3.7 - 5.6 cm)        Diastolic HBZIOT:527 ml  ORQ.9 cm(0.6 - 1.1 cm)         Aortic Root:3.2 cm(2.0 - 3.7 cm)  LVPWd:1 cm(0.6 - 1.1 cm)          LA Dimension: 4 cm(1.9 - 4.0 cm)  LA volume/Index: 51.1 ml /25m^2  LVOT:2 cm  RVDd:2.5 cm    Aortic    Valve Area (P1/2-Time): 2.39 cm^2       Peak Velocity: 1.25 m/s  Mean Velocity: 0.73 m/s  Peak Gradient: 6.25 mmHg  Mean Gradient: 2 mmHg Mean Gradient: 3 mmHg  P1/2t: 92 msec    Area (continuity): 1.89 cm^2  Area (continuity): 2.2 cm^2             AV VTI: 20.3 cm  Mean Velocity: 0.56 m/s    Tricuspid:                              Pulmonic:    Estimated RVSP: 53 mmHg                 Peak Velocity: 0.66 m/s  Peak TR Velocity: 3.28 m/s              Peak Gradient: 1.76 mmHg  Peak TR Gradient: 43.0336 mmHg  Estimated RA Pressure: 10 mmHg    Estimated PASP: 53.03 mmHg      Cardiac Catheterization:   No results found for this or any previous visit. ASSESSMENT AND PLAN     Assessment:    // //Bilateral groundglass infiltrate along with chronic fibrotic changes groundglass infiltrate is asymmetric in distribution much more on the right side mildly on the left side there is no specific distribution and in the upper and the lower lung and not necessarily peripheral or sub-pleural distribution along with small bilateral effusion.  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  //Non-ST elevation MI  //Small bilateral pleural effusion  //Coronary artery disease status post CABG  //Status post AICD  //History of nephrolithiasis  //Chronic steroid use for polymyalgia rheumatica on 3 mg prednisone.  //History of amiodarone use in the past.     Plan:    Patient initially was on high flow nasal cannula and now congestion BiPAP tachycardic tachypneic and in distress. There is no improvement in overall status with diuretics noninvasive ventilation and steroids and worsening hypoxia currently saturating 85% on 100% BiPAP.   Discussed with family options are limited with multiple medical problems with fibrosis and coronary artery disease and CHF family understand and discussing and wanted to change CODE STATUS to comfort care.  Palliative care had discussed the patient and also primary service. I have also discussed with son and the daughter and recommend comfort care measure and symptom control   Once CODE STATUS changed to comfort care would recommend to start symptom control pain control  Support provided to the family. I updated the patient regarding the current clinical condition, provisional diagnosis and management plan. I addressed concerns and answered all questions to the best of my abilities. It was my pleasure to evaluate Lorie Michel today. I would like to thank you for allowing me to participate in the care of this patient. Please feel free to call with any further questions or concerns. Brian Costa MD, M.D. Pulmonary and Critical Care Medicine           8/6/2021, 11:46 AM    Please note that this chart was generated using voice recognition Dragon dictation software. Although every effort was made to ensure the accuracy of this automated transcription, some errors in transcription may have occurred.

## 2021-08-06 NOTE — PROGRESS NOTES
Renal Progress Note    Patient :  Donna Lea; 82 y.o. MRN# [id number]  Location:  30/1710-20  Attending:  Rosa De La Paz MD  Admit Date:  2021   Hospital Day: 4      Subjective:     Patient was seen and examined. Lab work from today reviewed. Family did decide to change the CODE STATUS to comfort care today. Hospice has been consulted. Outpatient Medications:     Medications Prior to Admission: levothyroxine (SYNTHROID) 50 MCG tablet, TAKE 1 TABLET BY MOUTH EVERY DAY   citalopram (CELEXA) 20 MG tablet, TAKE 1 TABLET BY MOUTH EVERY DAY   losartan (COZAAR) 25 MG tablet, TAKE 1 TABLET BY MOUTH ONE TIME A DAY  meclizine (ANTIVERT) 25 MG tablet, Take 25 mg by mouth as needed for Dizziness   metoprolol tartrate (LOPRESSOR) 25 MG tablet, Take 25 mg by mouth nightly   allopurinol (ZYLOPRIM) 100 MG tablet, daily Indications: TAKES DAILY   predniSONE (DELTASONE) 1 MG tablet, Take 3 mg by mouth daily   aspirin 81 MG tablet, Take 81 mg by mouth daily. nitroGLYCERIN (NITROSTAT) 0.4 MG SL tablet, Place 0.4 mg under the tongue every 5 minutes as needed for Chest pain. Current Medications:     Scheduled Meds:    atorvastatin  40 mg Oral Nightly     Continuous Infusions:   PRN Meds:  glycopyrrolate, LORazepam **OR** LORazepam, fentanNYL **OR** fentanNYL **OR** fentanNYL, haloperidol lactate, sodium chloride flush, ondansetron **OR** ondansetron, polyethylene glycol, acetaminophen **OR** acetaminophen    Input/Output:       I/O last 3 completed shifts: In: 80 [P.O.:180; I.V.:336]  Out: 1600 [Urine:1600]. No data found.     Vital Signs:   Temperature:  Temp: 97.7 °F (36.5 °C)  TMax:   Temp (24hrs), Av.7 °F (36.5 °C), Min:97.4 °F (36.3 °C), Max:98 °F (36.7 °C)    Respirations:  Resp: (!) 44  Pulse:   Pulse: 97  BP:    BP: (!) 158/81  BP Range: Systolic (61SAU), KBT:885 , Min:111 , JBS:468       Diastolic (39BKK), PO, Min:66, Max:87      Physical Examination:     General:  Sleeping and moaning, did not answer any questions. HEENT: Atraumatic, normocephalic,   Eyes:   Pupils equal, round and reactive to light. Neck:   Supple  Chest:   Bilateral vesicular breath sounds, no rales or wheezes. Cardiac:  S1 S2 RR, no murmurs, gallops or rubs. Abdomen: Soft, non-tender, no masses or organomegaly, BS audible. :   No suprapubic or flank tenderness. Neuro:  Sleeping and moaning, did not answer any questions. SKIN:  No rashes, good skin turgor. Extremities:  No edema. Labs:       Recent Labs     08/04/21  0330 08/05/21  0641 08/06/21  0611   WBC 17.3* 24.8* 25.2*   RBC 4.39 4.48 4.64   HGB 13.3 13.7 14.0   HCT 41.9 41.6 44.2   MCV 95.4 92.9 95.3   MCH 30.3 30.6 30.2   MCHC 31.7 32.9 31.7   RDW 15.0* 14.9* 14.9*    238 233   MPV 10.9 11.2 11.7      BMP:   Recent Labs     08/04/21  0330 08/05/21  0641 08/06/21  0611    139 144   K 3.9 3.1* 3.4*    101 102   CO2 18* 18* 17*   BUN 42* 57* 65*   CREATININE 1.61* 1.87* 1.80*   GLUCOSE 134* 162* 141*   CALCIUM 8.0* 8.3* 8.2*      Magnesium:    Recent Labs     08/05/21  0641 08/06/21  0611   MG 3.3* 3.1*     ANNEMARIE:      Lab Results   Component Value Date    ANNEMARIE NEGATIVE 08/04/2021     SPEP:  Lab Results   Component Value Date    PROT 5.2 08/04/2021    ALBCAL 2.7 08/04/2021    ALBPCT 51 08/04/2021    LABALPH 0.4 08/04/2021    LABALPH 0.7 08/04/2021    A1PCT 7 08/04/2021    A2PCT 14 08/04/2021    LABBETA 0.6 08/04/2021    BETAPCT 12 08/04/2021    GAMGLOB 0.8 08/04/2021    GGPCT 16 08/04/2021    PATH ELECTRONICALLY SIGNED. Ruben Aguilera M.D. 08/04/2021    PATH ELECTRONICALLY SIGNED.  Ruben Aguilera M.D. 08/04/2021     C3:     Lab Results   Component Value Date    C3 126 08/04/2021     C4:     Lab Results   Component Value Date    C4 26 08/04/2021     MPO ANCA:     Lab Results   Component Value Date    MPO <0.3 08/04/2021     PR3 ANCA:     Lab Results   Component Value Date    PR3 <0.7 08/04/2021     Hep BsAg:         Lab Results   Component Value Date    HEPBSAG NONREACTIVE 08/04/2021     Hep C AB:          Lab Results   Component Value Date    HEPCAB NONREACTIVE 08/04/2021     Urinalysis/Chemistries:      Lab Results   Component Value Date    NITRU NEGATIVE 08/02/2021    COLORU YELLOW 08/02/2021    PHUR 5.5 08/02/2021    WBCUA 50  08/02/2021    RBCUA 5 TO 10 08/02/2021    MUCUS NOT REPORTED 08/02/2021    TRICHOMONAS NOT REPORTED 08/02/2021    YEAST NOT REPORTED 08/02/2021    BACTERIA NOT REPORTED 08/02/2021    SPECGRAV 1.032 08/02/2021    LEUKOCYTESUR SMALL 08/02/2021    UROBILINOGEN Normal 08/02/2021    BILIRUBINUR NEGATIVE 08/02/2021    GLUCOSEU NEGATIVE 08/02/2021    [address] NEGATIVE 08/02/2021    AMORPHOUS NOT REPORTED 08/02/2021       Radiology:     Reviewed. Assessment:     1. Acute Kidney Injury likely cardiorenal from hypoperfusion secondary to acute on chronic CHF. Admitting creatinine 1.37. Creatinine started to plateau now. 2. CKD stage III with baseline creatinine in the range of 1.2-1.4. Do not follow with any nephrologist.  Renal ultrasound negative for obstruction/hydronephrosis although suspicion for ill-defined 2.3 cm hypoechoic lesion in the posterior aspect of left kidney  3. Acute respiratory failure likely secondary to acute on chronic CHF. CT chest with bilateral patchy airspace disease with honeycomb appearance, concerning for IPF. Elevated pro-Pelon, on antibiotics to cover for pneumonia. 4. NSTEMI  5. Ischemic cardiomyopathy with EF 30-35% s/p biventricular AICD for CRT-D in 2017  6. Coronary artery disease s/p CABG in the past with last PCI in 2014  7. Hypertension  8. Hyperlipidemia  9. Recent diagnosis of high-grade noninvasive urothelial bladder carcinoma on biopsy 6/29/2021     Plan:   1. Family did change the CODE STATUS to comfort care. 2.  Hospice has been consulted and will take over patient's medications. 3.  Will honor family's wishes and nephrology will sign off.   Please call with any questions. Nutrition   Please ensure that patient is on a renal diet/TF. Avoid nephrotoxic drugs/contrast exposure. We will continue to follow along with you. Micheal Gonzalez MD  [facility]     This note is created with the assistance of a speech-recognition program. While intending to generate a document that actually reflects the content of the visit, no guarantees can be provided that every mistake has been identified and corrected by editing.

## 2021-08-06 NOTE — PLAN OF CARE
The patient's nurse paged that the family was ready to change the patient's CODE STATUS to [facility] and make comfortable. The Primary service changed the code prior to our arrival. We met with the family, ensured that all their requests were met and provided them comfort. We explained the dying process so that they know what to expect. They voiced understanding. Comfort care orders placed. Order placed to turn off the patient's AICD. RN updated on the plan.         Mateo Henao MD  Hospice/Palliative Care Fellow  [place], [place]  8/6/2021 12:50 PM

## 2021-08-06 NOTE — PROGRESS NOTES
Family has discussed and decided to change code status to DNR CC and implement hospice measures. Dr Beth Rubio informed as well as Dr Jose Baron. Orders being placed.

## 2021-08-06 NOTE — PROGRESS NOTES
spo2 maintaining in 50s range on 6l O2 per NC. PRN meds administered as ordered. Pt is anxious and confused. Family at bedside.

## 2021-08-06 NOTE — PROGRESS NOTES
Physical Therapy        Physical Therapy Cancel Note      DATE: 2021    NAME: Adan Bailey  MRN: [id number]   : 1935      Patient not seen this date for Physical Therapy due to: Other: Not appropriate. Resp. issues. Will check status .       Electronically signed by José Juarez PT on 2021 at 9:31 AM

## 2021-08-06 NOTE — PROGRESS NOTES
bipap removed and pt placed on 6l per NC. Ativan admin per orders. aicd rep called. Dr Hayes Cure verbaly ok'd aicd to be turned off. SPO2 62% at this time.

## 2021-08-06 NOTE — CARE COORDINATION
[facility] Quality Flow/Interdisciplinary Rounds Progress Note    Quality Flow Rounds held on August 6, 2021 at [address] Attending:  Bedside Nurse, ,  and Nursing Unit Leadership    Barriers to Discharge:     Anticipated Discharge Date:       Anticipated Discharge Disposition:    Readmission Risk              Risk of Unplanned Readmission:  21           Discussed patient goal for the day, patient clinical progression, and barriers to discharge. The following Goal(s) of the Day/Commitment(s) have been identified:  Referral - Hospice     Pt code status changed to [facility]. Plan is to remove Bi-Pap per patient and family request, will contact Hospice if appropriate as time may not support this    [id number]  CM placed referral to Hospice as ordered. Spoke with Bassem Traore in Admissions, they will have one of their nurses here today by [phone] or sooner. Bassem Traore will also contact patient's son Coit Breaker.   RN notified of plan      Kinsey Snyder RN  August 6, 2021

## 2021-08-06 NOTE — PROGRESS NOTES
[facility]il  Occupational Therapy Not Seen Note    DATE: 2021  Name: Juan Oro  : 1935  MRN: [id number]    Patient not available for Occupational Therapy due to:      [x] Other: Spoke to RN who reports pt continues with tachycardia and decreased respiratory status, not appropriate for participation in therapy at this time      Next Scheduled Treatment: Will continue to check back as able    Sumeet Messina, OT OTR/L

## 2021-08-06 NOTE — PROGRESS NOTES
Physician Progress Note      PATIENT:               Randi Back  CSN #:                  [id number]  :                       1935  ADMIT DATE:       2021 8:48 AM  DISCH DATE:  RESPONDING  PROVIDER #:        Miguel Pak MD          QUERY TEXT:    Patient admitted with SOB for 3 days, noted to have on CT Chest Extensive   pulmonary abnormalities , various interstitial pneumonias, probably most   likely. If possible, please document in progress notes and discharge summary   if you are evaluating and/or treating any of the following: The medical record reflects the following:  Risk Factors: CHF, COPD,  Clinical Indicators: WBC: 18.4, Pulse 104 , Temp 97.4 ? F (36.3 ? C) (Oral)  ,   Resp (!) 33  , SpO2 80%>81%> 90%>87>96 , 90% on BiPAP  FiO2 50  Treatment: IV  Zithromax, IV Rocephin,  IV lasix, BIpap    Thank you, Please call if questions  Yamileth Camacho RN [facility]  [phone]  Options provided:  -- Pneumonia  -- Pneumonia  ruled out  after further study  -- Other - I will add my own diagnosis  -- Disagree - Not applicable / Not valid  -- Disagree - Clinically unable to determine / Unknown  -- Refer to Clinical Documentation Reviewer    PROVIDER RESPONSE TEXT:    This patient has Pneumonia .     Query created by: Mick Hoffman on 2021 9:55 AM      Electronically signed by:  Miguel Pak MD 2021 3:05 PM

## 2021-08-06 NOTE — FLOWSHEET NOTE
[facility] [address]   Patient Death Note  DEATH   Shift date: 21    Shift day: Friday  Shift #: 2                 Room # 3018/8940-84   Name: Fredis Orellana            Age: 82 y.o. Gender: male          Holiness: None      Place of Scientologist: None   Admit Date & Time: 2021  8:48 AM     Referral: RN   Actual date of death: 21   TOD: 18:18       SITUATION AT DEATH:  Patient's children changed his code status to DNR- CC today. The patient  a few hours later. IS THIS A 'S CASE? No    SPIRITUAL/EMOTIONAL INTERVENTION:  The patient's children were present at the bedside. The  was able to be with the family and provide grief support. Family Received Grief Packet? Yes       NAME AND PHONE NUMBER OF DOCTOR SIGNING DEATH CERTIFICATE:  (full name) Sudha Cameron MD     (phone)  [phone] [facility] 18:45      Copy of COMPLETED Release of Body Form Received? Yes    Patient's belongings: With family- the family took the patient's dentures.  HOME:  Name: [facility]: [facility]   Phone Number: [phone]    NEXT OF KIN:  Name: Antonio Crain   Relationship: daughter   Street Address: [address] [place] [address] City: [address] [place] [address]: [place]   Zip code: [address]   Phone Number: [phone]    ANY FOLLOW-UP NEEDED? No    IF SO, WHAT? None     Electronically signed by Roxana Zhao, on 2021 at 7:05 PM.  [facility]  [phone]     21 1902   Encounter Summary   Services provided to: Family   Referral/Consult From: Nurse   Support System Children   Continue Visiting   (21)   Complexity of Encounter High   Length of Encounter 1 hour   Spiritual Assessment Completed Yes   Grief and Life Adjustment   Type Death   Assessment Grieving; Approachable   Intervention Prayer;Sustaining presence/ Ministry of presence

## 2021-08-06 NOTE — PROGRESS NOTES
[facility]  Internal Medicine Teaching Residency Program  Inpatient Daily Progress Note  ______________________________________________________________________________    Patient: Rhonda Millan  YOB: 1935   GUQ:[id number]    Acct: [de-identified]     Room: 18/0-65  Admit date: 2021  Today's date: 21  Number of days in the hospital: 4    SUBJECTIVE   CC: shortnesss of breath   Pt examined at bedside. Chart & results reviewed. - pt is tachycardic, RR:59 and O2 sat levels around 85   - palliative care talked to the family and decided to make him DNR- CCA  - urine output is normal   - Labs reviewed      Cr- 1.80      Gluc- 151      WBC: 25.2  - no acute events overnight. ROS:  Unable to obtain ROS as patient is on BiPAP. BRIEF HISTORY   The patient is a pleasant 82 y. o. male  with a PMH of CAD s/p triple CABG (20 yrs ago), s/p stent placement (), bladder cancer, HTN, s/p defibrillator placement (2017), COPD, arthritis and thyroid disease presents today to the ED with SOB for 3 days. It is exertional, relieves with rest. It is not associated with cough, or chest pain. He denies any orthopnea and PND. Prior to these, the patient has not been eating properly for a week, he does not feel hungry, and reports that he has lost around 10 lbs in the past week. Pt also c/o fever last night of around 100.4 for which he took tylenol. Afebrile on evaluation here.       He was diagnosed with bladder cancer on 2021 and has been depressed since then.  He had an appointment scheduled today with urology for further plan of treatment.      Pertinent labs were ordered.      Troponins: 269-> 300->415  Pro-BNP:4,625  WBC: 18.4    OBJECTIVE     Vital Signs:  /85   Pulse 108   Temp 98 °F (36.7 °C) (Temporal)   Resp (!) 40   Wt 178 lb (80.7 kg)   SpO2 (!) 89%   BMI 24.14 kg/m²     Temp (24hrs), Av.9 °F (36.6 °C), Min:97.1 °F (36.2 °C), Max:98.8 °F (37.1 °C)    In: 436   Out: 600 [Urine:600]    Physical Exam:  Physical Exam  Vitals reviewed. HENT:      Head: Normocephalic. Eyes:      General: Lids are normal.   Cardiovascular:      Rate and Rhythm: Normal rate. Pulses: Normal pulses. Pulmonary:      Effort: Tachypnea present. Breath sounds: Normal breath sounds and air entry. Abdominal:      General: Bowel sounds are normal.      Palpations: Abdomen is soft. Musculoskeletal:      Right lower leg: No swelling. Left lower leg: No swelling. Skin:     General: Skin is warm. Psychiatric:         Attention and Perception: He is inattentive. Mood and Affect: Mood is anxious. Behavior: Behavior is agitated.          Medications:  Scheduled Medications:    spironolactone  25 mg Oral Daily    OLANZapine  5 mg Oral Nightly    bumetanide  2 mg Intravenous Q12H    cefTRIAXone (ROCEPHIN) IV  1,000 mg Intravenous Q24H    azithromycin  250 mg Intravenous Q24H    methylPREDNISolone  40 mg Intravenous Q12H    metoprolol tartrate  12.5 mg Oral BID    aspirin  81 mg Oral Daily    [Held by provider] predniSONE  3 mg Oral Daily    levothyroxine  50 mcg Oral Daily    sodium chloride flush  5-40 mL Intravenous 2 times per day    atorvastatin  40 mg Oral Nightly     Continuous Infusions:    heparin (PORCINE) Infusion 20 Units/kg/hr (08/05/21 1933)    sodium chloride       PRN Medicationsheparin (porcine), 4,000 Units, PRN  heparin (porcine), 2,000 Units, PRN  sodium chloride flush, 5-40 mL, PRN  sodium chloride, 25 mL, PRN  ondansetron, 4 mg, Q8H PRN   Or  ondansetron, 4 mg, Q6H PRN  polyethylene glycol, 17 g, Daily PRN  acetaminophen, 650 mg, Q6H PRN   Or  acetaminophen, 650 mg, Q6H PRN        Diagnostic Labs:  CBC:   Recent Labs     08/04/21  0330 08/05/21  0641 08/06/21  0611   WBC 17.3* 24.8* 25.2*   RBC 4.39 4.48 4.64   HGB 13.3 13.7 14.0   HCT 41.9 41.6 44.2   MCV 95.4 92.9 95.3   RDW 15.0* 14.9* 14.9*   PLT 181 238 233     BMP:   Recent Labs     08/04/21  0330 08/05/21  0641    139   K 3.9 3.1*    101   CO2 18* 18*   BUN 42* 57*   CREATININE 1.61* 1.87*     BNP: No results for input(s): BNP in the last 72 hours. PT/INR: No results for input(s): PROTIME, INR in the last 72 hours. APTT:   Recent Labs     08/04/21  1008 08/05/21  0641 08/06/21  0611   APTT 63.2* 62.5* 70.2*     CARDIAC ENZYMES: No results for input(s): CKMB, CKMBINDEX, TROPONINI in the last 72 hours. Invalid input(s): CKTOTAL;3  FASTING LIPID PANEL:  Lab Results   Component Value Date    CHOL 198 04/04/2019    HDL 50 04/04/2019    TRIG 69 04/04/2019     LIVER PROFILE: No results for input(s): AST, ALT, ALB, BILIDIR, BILITOT, ALKPHOS in the last 72 hours. MICROBIOLOGY:   Lab Results   Component Value Date/Time    CULTURE NO SIGNIFICANT GROWTH 08/02/2021 11:47 AM       Imaging:    US RENAL COMPLETE    Result Date: 8/3/2021  No acute findings. No hydronephrosis. Ill-defined 2.3 cm hypoechoic lesion in the posterior aspect left kidney may represent the abnormality on the recent CT but cannot be accurately characterized and a small solid mass should be excluded. Recommend dedicated MRI or CT renal protocol when the patient's clinical condition allows. XR CHEST PORTABLE    Result Date: 8/5/2021  Bilateral pulmonary opacities are similar to prior radiograph with suspected small right pleural effusion. XR CHEST PORTABLE    Result Date: 8/5/2021  Little change from prior study. Continued bilateral interstitial and LV alert opacities. Postsurgical changes with indwelling pacemaker. XR CHEST PORTABLE    Result Date: 8/4/2021  Persistent mixed interstitial and alveolar opacities in the right greater than left lungs not substantially changed relative to yesterday on a background of chronic appearing interstitial changes. CT CHEST PULMONARY EMBOLISM W CONTRAST    Result Date: 8/2/2021  No evidence of pulmonary embolism.  Extensive pulmonary abnormalities, more right-sided as described above. 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 Small pleural effusions, more right-sided and extending along the right major fissure. Mild left heart chamber enlargement. No pericardial effusion. 1.9 cm exophytic left posterior renal lesion, not clearly cystic. Initial correlation with ultrasound recommended. Additional tiny high density cortical lesion on the right. Probable nonobstructing kidney stones. Low density focus within the lumen of the stomach, not clearly fluid. Intraluminal lipoma or other polypoid lesion possible. Small hiatus hernia. Upper GI or endoscopy should be considered. Prior granulomatous disease. Additional likely incidental findings, as detailed in the body of the report above. RECOMMENDATIONS: Pulmonology consultation. ASSESSMENT & PLAN     ASSESSMENT / PLAN:     Acute on chronic systolic heart failure  - Echo done in 2016 showed EF of 30-35%. - Pt has an AICD in place, pacer interrogation ordered. - Monitor strict I/O   - Switched from Lasix to Bumex. - Aldactone 25mg PO.   - Appreciate nephro recommendations. - Daily BMPs   - Echocardiogram done yesterday. Awaiting results.      Acute respiratory failure secondary to heart failure  - currently on BiPAP. - Maintain O2 sat above 92%.   COVID- negative  - CT done on arrival to the ED it showed mild left heart chamber enlargement, extensive pulmonary abnormalities, small pleural effusions, no evidence of pulmonary embolism. -CXR done yesterday showed continued B/L interstitial and LV alert opacities. - Pulmonology consulted.  Appreciate their recommendations     NSTEMI   -Troponins have been trending up 269-> 300->415-> 686  - continue heparin drip.   - Continue to monitor troponins.  - Cardio consulted. Appreciate their recommendations.        Acute Kidney Injury  -  Cr: 1.80  - Baseline Cr is 1.20-1.30  - Monitor closely.   - Nephro consulted. Appreciate their recommendations.      Leucocytosis suspected due to a lung source  - WBC: 25.2   - CT done on arrival to the ED it showed mild left heart chamber enlargement, extensive pulmonary abnormalities, small pleural effusions, no evidence of pulmonary embolism.   - Lactic acid: 2.4. Will continue to monitor.   -Procalcitonin: 4.18   - Urine culture showed no significant growth. Blood culture shows no growth at 3 days. - Continue Zithromax and Rocephin.      Coronary artery disease  - pt is s/p CABG x3    - pt had stent placement in    - On ASA 81mg at home   - Continue ASA and Lipitor.      Hypothyroidism  - On levothyroxine 50mcg at home   - continue here      Essential Hypertension  - Pt at home with Losartan 25mg   - Held for now. Resume when kidney function stabilizes.      Incidental findin.9cm exophytic left posterior renal lesion on CT. So USG was ordered which showed ill-defined heterogenous but predominantly hypoechoic area in the posterior aspect of the left kidney measuring upto 2.3 x 2.2 x 1.7 cm which is difficult to visualize. Small solid mass should be excluded. They recommended MRI or CT renal protocol when pt's acute symptoms resolve. PT/OT: PT is on board, but pt's saturation drops with minimal activity. Filiberto Breen MD  PGY-1, Internal Medicine Resident  [facility]         2021, 7:17 AM    I have discussed the care of Danielle Nelson , including pertinent history and exam findings,    today with the resident. I have seen and examined the patient and the key elements of all parts of the encounter have been performed by me .    I agree with the assessment, plan and orders as documented by the resident. Active Problems:    S/P CABG x 3 (2009)    CHF (congestive heart failure), NYHA class II ([facility])    CAD (coronary artery disease)    Essential hypertension    Polymyalgia rheumatica (HCC)    AICD (automatic cardioverter/defibrillator) present    Shortness of breath    DVT (deep vein thrombosis) in pregnancy    Acute hypoxemic respiratory failure ([facility] [address]    Community acquired pneumonia of right lung  Resolved Problems:    * No resolved hospital problems.  *        Overall  course ;                                          Patient has worsened overnight  CODE STATUS changed to DNR CC per family after discussion with palliative care  Had extensive discussion with patient and family at bedside  Hospice consult          Electronically signed by Jocy Donohue MD

## 2021-08-08 LAB
CULTURE: NORMAL
CULTURE: NORMAL
Lab: NORMAL
Lab: NORMAL
SPECIMEN DESCRIPTION: NORMAL
SPECIMEN DESCRIPTION: NORMAL

## 2021-08-09 ENCOUNTER — TELEPHONE (OUTPATIENT)
Dept: INTERNAL MEDICINE CLINIC | Age: 86
End: 2021-08-09

## 2021-08-09 NOTE — TELEPHONE ENCOUNTER
Received call from [facility], Brad Cheng  on 2021 at [facility]. Would you sign death certificate?

## 2021-08-10 NOTE — TELEPHONE ENCOUNTER
Contacted  to inform that Dr Kasey Mcneill will sign, voiced that she will email death certificate over.

## 2021-09-27 NOTE — DISCHARGE SUMMARY
[facility]     Department of Internal Medicine - Staff Internal Medicine Teaching Service    INPATIENT DISCHARGE SUMMARY      Patient Identification:  Scar Lr is a 82 y.o. male. :  1935  MRN: [id number]     Acct: [de-identified]   PCP: No primary care provider on file. Admit Date:  2021  Discharge date and time: 2021  9:06 PM   Attending Provider: No att. providers found                                     [facility] Problem Lists:  Active Problems:    S/P CABG x 3 ()    CHF (congestive heart failure), NYHA class II (HCC)    CAD (coronary artery disease)    Essential hypertension    Polymyalgia rheumatica (HCC)    AICD (automatic cardioverter/defibrillator) present    Shortness of breath    DVT (deep vein thrombosis) in pregnancy    Acute hypoxemic respiratory failure ([address]    Community acquired pneumonia of right lung  Resolved Problems:    * No resolved hospital problems. [facility] STAY     Brief Inpatient course:   Scar Lr is a 82 y.o. male who was admitted for the management of acute on chronic systolic heart failure. Pt presented with shortness of breath for 3 days prior to admission it was exertional and relieved with rest was not associated with cough or chest pain. He was diagnosed with bladder cancer on 2021 and has been depressed since then. He has lost around 10 pounds for a week prior to admission. He was being treated for acute on chronic systolic heart failure, NSTEMI, DARYL, leucocytosis, likely secondary to a lung source. There was an incidental finding of 1.9cm exophytic left posterior renal lesion on CT. He required 100% on BiPAP and dropped to 70% on RA. He had a lot of comorbidities and palliative was consulted for possibility of hospice and regarding the code status. Comfort care was started, and was weaned off the BiPAP eventually to NC.  Pt was declared dead on 2021 at 06: 18PM.    Significant therapeutic interventions done at the hospital:    Acute on chronic systolic heart failure  - Echo done in 2016 showed EF of 30-35%. - Pt has an AICD in place  -monitored I/O  -gave diuretics  -nephro was consulted      Acute respiratory failure secondary to heart failure  -was maintaining saturations on BiPAP  - CT done on arrival to the ED it showed mild left heart chamber enlargement, extensive pulmonary abnormalities, small pleural effusions, no evidence of pulmonary embolism. -CXR done  showed continued B/L interstitial and LV alert opacities. - Pulmonology consulted.      NSTEMI   -Troponins trended up 269-> 300->415-> 686  -heparin drip.   -Cardio consulted.      Acute Kidney Injury  -  Cr: 1.87  - Baseline Cr is 1.20-1.30  - Monitored closely  - Nephro consulted. Appreciate their recommendations.      Leucocytosis suspected due to a lung source  - WBC: 24.8   - CT done on arrival to the ED it showed mild left heart chamber enlargement, extensive pulmonary abnormalities, small pleural effusions, no evidence of pulmonary embolism.   - Lactic acid: 2.4. Will continue to monitor.   -Procalcitonin: 4.18   - Urine culture showed no significant growth. Blood culture shows no growth at 3 days.    -was started on antibiotics    PMH:   -s/p aicd placement   -CAD s/p CABG x3 2009  -hypothyroidism   -HTN    Procedures/ Significant Interventions:    none    Consults:     Consults:     Final Specialist Recommendations/Findings:   IP CONSULT TO CARDIOLOGY  IP CONSULT TO INTERNAL MEDICINE  IP CONSULT TO CASE MANAGEMENT  IP CONSULT TO PULMONOLOGY  IP CONSULT TO NEPHROLOGY  IP CONSULT TO PALLIATIVE CARE  IP CONSULT TO HOSPICE        DISCHARGE PLAN     Disposition:     Note that over 30 minutes was spent in preparing death summary. Mare Zamora MD  Internal Medicine Resident, PGY-1  [address], [place]  2021, 12:21 PM

## (undated) DEVICE — TOWEL,OR,DSP,ST,BLUE,DLX,XR,4/PK,20PK/CS: Brand: MEDLINE

## (undated) DEVICE — STRAP,CATHETER,ELASTIC,HOOK&LOOP: Brand: MEDLINE

## (undated) DEVICE — BAG,LEG,COMFORT-STRAPS,LARGE,32OZ: Brand: MEDLINE

## (undated) DEVICE — SINGLE ACTION PUMPING SYSTEM

## (undated) DEVICE — GLOVE SURG SZ 7 CRM LTX FREE POLYISOPRENE POLYMER BEAD ANTI

## (undated) DEVICE — YANKAUER,FLEXIBLE HANDLE,REGLR CAPACITY: Brand: MEDLINE INDUSTRIES, INC.

## (undated) DEVICE — GLOVE SURG SZ 7 L12IN FNGR THK87MIL WHT LTX FREE

## (undated) DEVICE — DILATOR/SHEATH SET: Brand: 8/10 DILATOR/SHEATH SET

## (undated) DEVICE — TUR/ENDOSCOPIC CABLE, 10' (3.05 M): Brand: CONMED

## (undated) DEVICE — Y-TYPE TUR/BLADDER IRRIGATION SET, REGULATING CLAMP

## (undated) DEVICE — ELECTRODE LOOP 27FR WIRE DIA0.35MM BRN CUT ANG 1 STEM W/

## (undated) DEVICE — CONTAINER,SPECIMEN,OR STERILE,4OZ: Brand: MEDLINE

## (undated) DEVICE — SURGICAL PROCEDURE KIT BASIN MAJ SET UP

## (undated) DEVICE — Z INACTIVE USE 2635503 SOLUTION IRRIG 3000ML ST H2O USP UROMATIC PLAS CONT

## (undated) DEVICE — GLOVE SURG SZ 8 L12IN FNGR THK87MIL WHT LTX FREE

## (undated) DEVICE — CATHETER URET 6FR L70CM SFT PERCFLX SGL LUMN OPN END TAPR

## (undated) DEVICE — CHECK-FLO HEMOSTASIS ASSEMBLY: Brand: CHECK-FLO

## (undated) DEVICE — RADIFOCUS GLIDEWIRE: Brand: GLIDEWIRE

## (undated) DEVICE — CATHETER URET 5FR L70CM TIP 8FR OPN END CONE TIP INJ HUB

## (undated) DEVICE — GOWN,AURORA,NONREINFORCED,LARGE: Brand: MEDLINE

## (undated) DEVICE — BALLOON DILATATION CATHETER: Brand: UROMAX ULTRA

## (undated) DEVICE — TOWEL,OR,DSP,ST,BLUE,STD,4/PK,20PK/CS: Brand: MEDLINE

## (undated) DEVICE — TUBING, SUCTION, 1/4" X 12', STRAIGHT: Brand: MEDLINE

## (undated) DEVICE — Device

## (undated) DEVICE — ELECTRODE PT RET AD L9FT HI MOIST COND ADH HYDRGEL CORDED

## (undated) DEVICE — DUP USE 240185 SOLUTION IV IRRIG WATER 1000ML IRRIG BAG 2B7114

## (undated) DEVICE — SYRINGE 20ML LL S/C 50

## (undated) DEVICE — CATHETER,FOLEY,COUDE,LATEX,18FR,10ML: Brand: MEDLINE

## (undated) DEVICE — CUTTING LOOP, BIPOLAR, 0.30MM, 24/26 FR.: Brand: N.A.

## (undated) DEVICE — GOWN,AURORA,NONRNF,XL,30/CS: Brand: MEDLINE